# Patient Record
Sex: MALE | Race: WHITE | Employment: OTHER | URBAN - METROPOLITAN AREA
[De-identification: names, ages, dates, MRNs, and addresses within clinical notes are randomized per-mention and may not be internally consistent; named-entity substitution may affect disease eponyms.]

---

## 2017-01-01 ENCOUNTER — HOSPITAL ENCOUNTER (OUTPATIENT)
Dept: OTHER | Age: 67
Discharge: HOME | End: 2017-01-31
Attending: INTERNAL MEDICINE | Admitting: INTERNAL MEDICINE

## 2017-01-16 ENCOUNTER — TELEPHONE (OUTPATIENT)
Dept: CARDIOLOGY CLINIC | Age: 67
End: 2017-01-16

## 2017-01-16 RX ORDER — DILTIAZEM HYDROCHLORIDE 240 MG/1
CAPSULE, COATED, EXTENDED RELEASE ORAL
Qty: 90 CAPSULE | Refills: 3 | Status: SHIPPED | OUTPATIENT
Start: 2017-01-16 | End: 2018-01-04 | Stop reason: SDUPTHER

## 2017-01-23 ENCOUNTER — ANTI-COAG VISIT (OUTPATIENT)
Dept: PHARMACY | Age: 67
End: 2017-01-23

## 2017-01-23 DIAGNOSIS — I48.21 PERMANENT ATRIAL FIBRILLATION (HCC): ICD-10-CM

## 2017-01-23 LAB
INR BLD: 2.6
PROTIME: 31.6 SECONDS

## 2017-03-06 ENCOUNTER — ANTI-COAG VISIT (OUTPATIENT)
Dept: PHARMACY | Age: 67
End: 2017-03-06

## 2017-03-06 DIAGNOSIS — I48.21 PERMANENT ATRIAL FIBRILLATION (HCC): ICD-10-CM

## 2017-03-06 LAB — INR BLD: 2.2

## 2017-04-03 ENCOUNTER — TELEPHONE (OUTPATIENT)
Dept: CARDIOLOGY CLINIC | Age: 67
End: 2017-04-03

## 2017-04-10 ENCOUNTER — TELEPHONE (OUTPATIENT)
Dept: PHARMACY | Age: 67
End: 2017-04-10

## 2017-04-18 RX ORDER — LISINOPRIL AND HYDROCHLOROTHIAZIDE 20; 12.5 MG/1; MG/1
TABLET ORAL
Qty: 90 TABLET | Refills: 3 | Status: SHIPPED | OUTPATIENT
Start: 2017-04-18 | End: 2018-04-05 | Stop reason: ALTCHOICE

## 2017-04-28 ENCOUNTER — ANTI-COAG VISIT (OUTPATIENT)
Dept: PHARMACY | Age: 67
End: 2017-04-28

## 2017-04-28 DIAGNOSIS — I48.21 PERMANENT ATRIAL FIBRILLATION (HCC): ICD-10-CM

## 2017-04-28 LAB
INR BLD: 1.3
PROTIME: 15.7 SECONDS

## 2017-05-25 ENCOUNTER — TELEPHONE (OUTPATIENT)
Dept: PHARMACY | Age: 67
End: 2017-05-25

## 2017-05-31 ENCOUNTER — ANTI-COAG VISIT (OUTPATIENT)
Dept: PHARMACY | Age: 67
End: 2017-05-31

## 2017-05-31 DIAGNOSIS — I48.21 PERMANENT ATRIAL FIBRILLATION (HCC): ICD-10-CM

## 2017-05-31 LAB — INR BLD: 2.5

## 2017-07-12 ENCOUNTER — ANTI-COAG VISIT (OUTPATIENT)
Dept: PHARMACY | Age: 67
End: 2017-07-12

## 2017-07-12 DIAGNOSIS — I48.21 PERMANENT ATRIAL FIBRILLATION (HCC): ICD-10-CM

## 2017-07-12 LAB
INR BLD: 2.1
PROTIME: 25 SECONDS

## 2017-08-24 ENCOUNTER — ANTI-COAG VISIT (OUTPATIENT)
Dept: PHARMACY | Age: 67
End: 2017-08-24

## 2017-08-24 DIAGNOSIS — I48.21 PERMANENT ATRIAL FIBRILLATION (HCC): ICD-10-CM

## 2017-08-24 LAB
INR BLD: 2.8
PROTIME: 33.8 SECONDS

## 2017-10-04 ENCOUNTER — ANTI-COAG VISIT (OUTPATIENT)
Dept: PHARMACY | Age: 67
End: 2017-10-04

## 2017-10-04 DIAGNOSIS — I48.21 PERMANENT ATRIAL FIBRILLATION (HCC): ICD-10-CM

## 2017-10-04 LAB
INR BLD: 2.7
PROTIME: 32.7 SECONDS

## 2017-10-04 NOTE — MR AVS SNAPSHOT
type 2 diabetes, stroke, gallstones, arthritis, sleep apnea, and certain cancers. BMI is not perfect. It may overestimate body fat in athletes and people who are more muscular. Even a small weight loss (between 5 and 10 percent of your current weight) by decreasing your calorie intake and becoming more physically active will help lower your risk of developing or worsening diseases associated with obesity. Learn more at: [email or web address]             Medications and Orders      Your Current Medications Are              metoprolol tartrate (LOPRESSOR) 25 MG tablet TAKE 1 TABLET BY MOUTH TWICE DAILY    lisinopril-hydrochlorothiazide (PRINZIDE;ZESTORETIC) 20-12.5 MG per tablet TAKE 1 TABLET BY MOUTH DAILY    diltiazem (CARDIZEM CD) 240 MG extended release capsule TAKE 1 CAPSULE BY MOUTH DAILY    metFORMIN (GLUCOPHAGE) 1000 MG tablet Take 1,000 mg by mouth 2 times daily (with meals). warfarin (COUMADIN) 5 MG tablet One tab daily, as directed    zolpidem (AMBIEN) 10 MG tablet     atorvastatin (LIPITOR) 10 MG tablet Take 1 tablet by mouth daily. aspirin EC 81 MG EC tablet Take 1 tablet by mouth daily. Allergies           No Known Allergies      We Ordered/Performed the following           Protime-INR     Comments: This external order was created through the results console.          Result Summary for Protime-INR      Result Information     Status          Final result (Resulted: 10/4/2017  8:59 AM)           10/4/2017  8:59 AM      Component Results     Component Value Ref Range & Units Status    INR 2.7  Final    Protime 32.7 seconds Final               Additional Information        Basic Information     Date Of Birth Sex Race Ethnicity Preferred Language    1950 Male White Non-/Non  English      Problem List as of 10/4/2017  Date Reviewed: 12/12/2016                Atrial fibrillation ([facility] [address]    Essential hypertension, benign Coronary atherosclerosis of native coronary artery    Sleep apnea    Long term current use of anticoagulant therapy    Chronic pain of right knee    Morbid obesity with BMI of 40.0-44.9, adult (HCC)    Pain in joint, lower leg    Primary osteoarthritis of right knee    Chondromalacia of right patellofemoral joint    Status post arthroscopic surgery of right knee chondroplasty, synovectomy, partial medial meniscectomy on 2/28/2006 (3A patellofemoral and medial compartment)    Anemia    GI bleed      Immunizations as of 10/4/2017     Name Date    Influenza Virus Vaccine 11/4/2013      Preventive Care        Date Due    Hepatitis C screening is recommended for all adults regardless of risk factors born between [facility] at least once (lifetime) who have never been tested. 1950    Tetanus Combination Vaccine (1 - Tdap) 3/10/1970    Diabetes Screening 3/10/1991    Colonoscopy 3/10/2001    Zoster Vaccine 3/10/2011    Cholesterol Screening 2/11/2017    Pneumococcal Vaccines (two) for all adults aged 72 and over (2 of 2 - PPSV23) 6/1/2017    Yearly Flu Vaccine (1) 9/1/2017            Overblogt Signup           Our records indicate that you have an active [employer] account. You can view your After Visit Summary by going to [email or web address] and logging in with your [employer] username and password. If you don't have a [employer] username and password but a parent or guardian has access to your record, the parent or guardian should login with their own [employer] username and password and access your record to view the After Visit Summary. Additional Information  If you have questions, please contact the physician practice where you receive care. Remember, [employer] is NOT to be used for urgent needs. For medical emergencies, dial 911. For questions regarding your [employer] account call [phone]. If you have a clinical question, please call your doctor's office. October 2017 Details    Sun Mon Tue Wed Thu Fri Sat     1               2               3               4      5 mg   See details      5      5 mg         6      5 mg         7      5 mg           8      5 mg         9      5 mg         10      5 mg         11      5 mg         12      5 mg         13      5 mg         14      5 mg           15      5 mg         16      5 mg         17      5 mg         18      5 mg         19      5 mg         20      5 mg         21      5 mg           22      5 mg         23      5 mg         24      5 mg         25      5 mg         26      5 mg         27      5 mg         28      5 mg           29      5 mg         30      5 mg         31      5 mg              Date Details   10/04 This INR check               How to take your warfarin dose              To take:  5 mg Take 1 of the 5 mg tablets. November 2017 Details    Sun Mon Tue Wed Thu Fri Sat        1      5 mg         2      5 mg         3      5 mg         4      5 mg           5      5 mg         6      5 mg         7      5 mg         8      5 mg         9      5 mg         10      5 mg         11      5 mg           12      5 mg         13      5 mg         14      5 mg         15      5 mg         16               17               18                 19               20               21               22               23               24               25                 26               27               28               29               30                  Date Details   No additional details    Date of next INR:  11/15/2017         How to take your warfarin dose              To take:  5 mg Take 1 of the 5 mg tablets.

## 2017-11-01 ENCOUNTER — HOSPITAL ENCOUNTER (OUTPATIENT)
Dept: OTHER | Age: 67
Discharge: HOME | End: 2017-11-30
Attending: INTERNAL MEDICINE | Admitting: INTERNAL MEDICINE

## 2017-11-03 ENCOUNTER — TELEPHONE (OUTPATIENT)
Dept: ORTHOPEDIC SURGERY | Age: 67
End: 2017-11-03

## 2017-11-03 NOTE — TELEPHONE ENCOUNTER
11/03/2017 [facility]     RIGHT KNEE. NO AUTHORIZATION REQUIRED. CAN BUY& BILL. JUAN FOLLOWS MEDICARE GUIDELINES.  AP

## 2017-11-15 ENCOUNTER — ANTI-COAG VISIT (OUTPATIENT)
Dept: PHARMACY | Age: 67
End: 2017-11-15

## 2017-11-15 DIAGNOSIS — I48.21 PERMANENT ATRIAL FIBRILLATION (HCC): ICD-10-CM

## 2017-11-15 LAB
INR BLD: 2.5
PROTIME: 30.4 SECONDS

## 2017-11-16 ENCOUNTER — TELEPHONE (OUTPATIENT)
Dept: CARDIOLOGY CLINIC | Age: 67
End: 2017-11-16

## 2017-11-16 NOTE — LETTER
[address]  [address]  [address] [address]  Phone: [phone]  Fax: [phone]    Yain Nickerson MD        November 22, 2017     Patient: Elizabeth Camargo   YOB: 1950   Date of Visit: 11/16/2017       To Whom It May Concern: It is my medical opinion that Elizabeth Camargo should hold coumadin no longer than 3 days. If you have any questions or concerns, please don't hesitate to call.     Sincerely,        Yani Nickerson MD

## 2017-12-01 ENCOUNTER — HOSPITAL ENCOUNTER (OUTPATIENT)
Dept: OTHER | Age: 67
Discharge: HOME | End: 2017-12-31
Attending: INTERNAL MEDICINE | Admitting: INTERNAL MEDICINE

## 2018-01-01 ENCOUNTER — HOSPITAL ENCOUNTER (OUTPATIENT)
Dept: OTHER | Age: 68
Discharge: HOME | End: 2018-01-31
Attending: INTERNAL MEDICINE | Admitting: INTERNAL MEDICINE

## 2018-01-05 RX ORDER — DILTIAZEM HYDROCHLORIDE 240 MG/1
CAPSULE, EXTENDED RELEASE ORAL
Qty: 90 CAPSULE | Refills: 0 | Status: SHIPPED | OUTPATIENT
Start: 2018-01-05 | End: 2018-04-18 | Stop reason: SDUPTHER

## 2018-01-08 ENCOUNTER — ANTI-COAG VISIT (OUTPATIENT)
Dept: PHARMACY | Age: 68
End: 2018-01-08

## 2018-01-08 DIAGNOSIS — I48.21 PERMANENT ATRIAL FIBRILLATION (HCC): ICD-10-CM

## 2018-01-08 LAB
INR BLD: 2.8
PROTIME: 33.3 SECONDS

## 2018-01-17 ENCOUNTER — OFFICE VISIT (OUTPATIENT)
Dept: CARDIOLOGY CLINIC | Age: 68
End: 2018-01-17

## 2018-01-17 VITALS
WEIGHT: 315 LBS | BODY MASS INDEX: 45.1 KG/M2 | HEART RATE: 76 BPM | DIASTOLIC BLOOD PRESSURE: 82 MMHG | HEIGHT: 70 IN | OXYGEN SATURATION: 93 % | SYSTOLIC BLOOD PRESSURE: 126 MMHG

## 2018-01-17 DIAGNOSIS — G47.30 SLEEP APNEA, UNSPECIFIED TYPE: ICD-10-CM

## 2018-01-17 DIAGNOSIS — I25.10 ATHEROSCLEROSIS OF NATIVE CORONARY ARTERY OF NATIVE HEART WITHOUT ANGINA PECTORIS: ICD-10-CM

## 2018-01-17 DIAGNOSIS — E66.9 OBESITY (BMI 30-39.9): ICD-10-CM

## 2018-01-17 DIAGNOSIS — I48.21 PERMANENT ATRIAL FIBRILLATION (HCC): Primary | ICD-10-CM

## 2018-01-17 DIAGNOSIS — I10 ESSENTIAL HYPERTENSION, BENIGN: ICD-10-CM

## 2018-01-17 PROCEDURE — 99214 OFFICE O/P EST MOD 30 MIN: CPT | Performed by: INTERNAL MEDICINE

## 2018-01-17 PROCEDURE — 93000 ELECTROCARDIOGRAM COMPLETE: CPT | Performed by: INTERNAL MEDICINE

## 2018-01-17 NOTE — PROGRESS NOTES
GASTROINTESTINAL ENDOSCOPY  02/07/14    EGD/Colonoscopy with biopsy of ulcer       Allergies:  No Known Allergies    Social History:   reports that he has never smoked. He has never used smokeless tobacco. He reports that he drinks about 0.6 oz of alcohol per week . He reports that he does not use drugs. Family History:  family history includes Cancer in his father and sister; Heart Disease in his mother. Reviewed. Denies family history of sudden cardiac death, arrhythmia, premature CAD    Review of System:  All other systems reviewed and are negative except for that noted above. Pertinent negatives are:   General: negative for fever, chills   Ophthalmic ROS: negative for - eye pain or loss of vision  ENT ROS: negative for - headaches, sore throat   Respiratory: negative for - cough, sputum  Cardiovascular: Reviewed in HPI  Gastrointestinal: negative for - abdominal pain, diarrhea, N/V  Hematology: negative for - bleeding, blood clots, bruising or jaundice  Genito-Urinary:  negative for - Dysuria or incontinence  Musculoskeletal: negative for - Joint swelling, muscle pain  Neurological: negative for - confusion, dizziness, headaches   Psychiatric: No anxiety, no depression. Dermatological: negative for - rash    Physical Examination:  Vitals:    01/17/18 1334   BP: 126/82   Pulse: 76   SpO2: 93%      Wt Readings from Last 3 Encounters:   01/17/18 (!) 326 lb 12.8 oz (148.2 kg)   12/21/17 (!) 309 lb (140.2 kg)   10/26/17 (!) 304 lb (137.9 kg)       · Constitutional: Oriented. No distress. · Head: Normocephalic and atraumatic. · Mouth/Throat: Oropharynx is clear and moist.   · Eyes: Conjunctivae normal. EOM are normal.   · Neck: Neck supple. No rigidity. No JVD present. · Cardiovascular: Normal rate, irregular rhythm, S1&S2. · Pulmonary/Chest: Bilateral respiratory sounds. No wheezes, No rhonchi. · Abdominal: Soft. Bowel sounds present. No distension, No tenderness.    · Musculoskeletal: No

## 2018-02-01 ENCOUNTER — HOSPITAL ENCOUNTER (OUTPATIENT)
Dept: OTHER | Age: 68
Discharge: HOME | End: 2018-02-28
Attending: INTERNAL MEDICINE | Admitting: INTERNAL MEDICINE

## 2018-02-21 ENCOUNTER — ANTI-COAG VISIT (OUTPATIENT)
Dept: PHARMACY | Age: 68
End: 2018-02-21

## 2018-02-21 DIAGNOSIS — I48.21 PERMANENT ATRIAL FIBRILLATION (HCC): ICD-10-CM

## 2018-02-21 LAB
INR BLD: 2.3
PROTIME: 27.5 SECONDS

## 2018-03-01 ENCOUNTER — HOSPITAL ENCOUNTER (OUTPATIENT)
Dept: OTHER | Age: 68
Discharge: HOME | End: 2018-03-31
Attending: INTERNAL MEDICINE | Admitting: INTERNAL MEDICINE

## 2018-03-17 PROBLEM — K92.2 UPPER GI BLEED: Status: ACTIVE | Noted: 2018-03-17

## 2018-04-01 ENCOUNTER — HOSPITAL ENCOUNTER (OUTPATIENT)
Dept: OTHER | Age: 68
Discharge: HOME | End: 2018-04-30
Attending: INTERNAL MEDICINE | Admitting: INTERNAL MEDICINE

## 2018-04-09 ENCOUNTER — OFFICE VISIT (OUTPATIENT)
Dept: CARDIOLOGY CLINIC | Age: 68
End: 2018-04-09

## 2018-04-09 ENCOUNTER — ANTI-COAG VISIT (OUTPATIENT)
Dept: PHARMACY | Age: 68
End: 2018-04-09

## 2018-04-09 VITALS
OXYGEN SATURATION: 94 % | DIASTOLIC BLOOD PRESSURE: 84 MMHG | WEIGHT: 315 LBS | HEIGHT: 70 IN | SYSTOLIC BLOOD PRESSURE: 124 MMHG | BODY MASS INDEX: 45.1 KG/M2 | HEART RATE: 90 BPM

## 2018-04-09 DIAGNOSIS — I48.91 ATRIAL FIBRILLATION WITH RAPID VENTRICULAR RESPONSE (HCC): ICD-10-CM

## 2018-04-09 DIAGNOSIS — I48.20 CHRONIC ATRIAL FIBRILLATION (HCC): Primary | ICD-10-CM

## 2018-04-09 DIAGNOSIS — I10 ESSENTIAL HYPERTENSION: ICD-10-CM

## 2018-04-09 DIAGNOSIS — E78.2 MIXED HYPERLIPIDEMIA: ICD-10-CM

## 2018-04-09 LAB
INR BLD: 2.3
PROTIME: 27.2 SECONDS

## 2018-04-09 PROCEDURE — 99214 OFFICE O/P EST MOD 30 MIN: CPT | Performed by: INTERNAL MEDICINE

## 2018-04-09 RX ORDER — FUROSEMIDE 40 MG/1
20 TABLET ORAL DAILY
Qty: 30 TABLET | Refills: 5 | Status: SHIPPED | OUTPATIENT
Start: 2018-04-09 | End: 2019-05-21 | Stop reason: SDUPTHER

## 2018-04-18 ENCOUNTER — TELEPHONE (OUTPATIENT)
Dept: CARDIOLOGY CLINIC | Age: 68
End: 2018-04-18

## 2018-04-18 RX ORDER — DILTIAZEM HYDROCHLORIDE 240 MG/1
CAPSULE, COATED, EXTENDED RELEASE ORAL
Qty: 90 CAPSULE | Refills: 2 | Status: SHIPPED | OUTPATIENT
Start: 2018-04-18 | End: 2019-06-13 | Stop reason: SDUPTHER

## 2018-04-18 RX ORDER — DILTIAZEM HYDROCHLORIDE 240 MG/1
CAPSULE, EXTENDED RELEASE ORAL
Qty: 90 CAPSULE | Refills: 3 | Status: SHIPPED | OUTPATIENT
Start: 2018-04-18 | End: 2019-03-26 | Stop reason: SDUPTHER

## 2018-04-23 RX ORDER — DILTIAZEM HYDROCHLORIDE 240 MG/1
CAPSULE, COATED, EXTENDED RELEASE ORAL
Qty: 90 CAPSULE | Refills: 2 | OUTPATIENT
Start: 2018-04-23

## 2018-05-01 ENCOUNTER — HOSPITAL ENCOUNTER (OUTPATIENT)
Dept: OTHER | Age: 68
Discharge: HOME | End: 2018-05-31
Attending: INTERNAL MEDICINE | Admitting: INTERNAL MEDICINE

## 2018-05-15 ENCOUNTER — TELEPHONE (OUTPATIENT)
Dept: PHARMACY | Age: 68
End: 2018-05-15

## 2018-05-17 ENCOUNTER — ANTI-COAG VISIT (OUTPATIENT)
Dept: PHARMACY | Age: 68
End: 2018-05-17

## 2018-05-17 DIAGNOSIS — I48.91 ATRIAL FIBRILLATION WITH RAPID VENTRICULAR RESPONSE (HCC): ICD-10-CM

## 2018-05-17 LAB
INR BLD: 3.8
PROTIME: 46.1 SECONDS

## 2018-06-01 ENCOUNTER — HOSPITAL ENCOUNTER (OUTPATIENT)
Dept: OTHER | Age: 68
Discharge: HOME | End: 2018-06-30
Attending: INTERNAL MEDICINE | Admitting: INTERNAL MEDICINE

## 2018-06-14 ENCOUNTER — ANTI-COAG VISIT (OUTPATIENT)
Dept: PHARMACY | Age: 68
End: 2018-06-14

## 2018-06-14 ENCOUNTER — TELEPHONE (OUTPATIENT)
Dept: CARDIOLOGY CLINIC | Age: 68
End: 2018-06-14

## 2018-06-14 DIAGNOSIS — I48.91 ATRIAL FIBRILLATION WITH RAPID VENTRICULAR RESPONSE (HCC): ICD-10-CM

## 2018-06-14 LAB
INR BLD: 2.6
PROTIME: 30.8 SECONDS

## 2018-06-29 ENCOUNTER — HOSPITAL ENCOUNTER (OUTPATIENT)
Dept: CARDIAC CATH/INVASIVE PROCEDURES | Age: 68
Discharge: HOME | End: 2018-06-20
Attending: INTERNAL MEDICINE | Admitting: INTERNAL MEDICINE

## 2018-06-29 ENCOUNTER — HOSPITAL ENCOUNTER (OUTPATIENT)
Dept: NON INVASIVE DIAGNOSTICS | Age: 68
Discharge: HOME | End: 2018-06-28
Attending: INTERNAL MEDICINE | Admitting: INTERNAL MEDICINE

## 2018-07-01 ENCOUNTER — HOSPITAL ENCOUNTER (OUTPATIENT)
Dept: OTHER | Age: 68
Discharge: HOME | End: 2018-07-31
Attending: INTERNAL MEDICINE | Admitting: INTERNAL MEDICINE

## 2018-07-26 ENCOUNTER — ANTI-COAG VISIT (OUTPATIENT)
Dept: PHARMACY | Age: 68
End: 2018-07-26

## 2018-07-26 DIAGNOSIS — I48.91 ATRIAL FIBRILLATION WITH RAPID VENTRICULAR RESPONSE (HCC): ICD-10-CM

## 2018-07-26 LAB
INR BLD: 3.8
PROTIME: 45.9 SECONDS

## 2018-07-26 NOTE — PROGRESS NOTES
Mr. Lyric Auguste is a 80 y.o. y/o male with history of Afib   He presents today for anticoagulation monitoring and adjustment. Pertinent PMH: HTN, CAD     Patient Reported Findings:  Yes     No  [x]   []       Patient verifies current dosing regimen as listed  []   [x]       S/S bleeding/bruising/swelling/SOB  []   [x]       Blood in urine or stool  []   [x]       Procedures scheduled in the future at this time  []   [x]       Missed dose  []   [x]       Extra dose  []   [x]       Change in medications  []   [x]       Change in health/diet/appetite  [x]   []       Change in alcohol use--states that he has been drinking 1 glass every so often, not normal.  []   [x]       Change in activity   []   [x]       Hospital admission  []   [x]       Emergency department visit  [x]   []       Other complaints ---Sister-in-law and brother in [place]. Sister-in-law with some health issues. Patient is planning to visit [place]. Clinical Outcomes:  Yes     No  []   [x]       Major bleeding event  []   [x]       Thromboembolic event    Takes warfarin in AM  Duration of warfarin Therapy: indefinitly  INR Range:  2.0-3.0    INR 3.8 today despite no changes   Hold dose tomorrow then continue same weekly dose of 5mg daily. Encouraged to maintain a consistency of vegetables/salads. Recheck INR in 3 weeks since going on vacation, 8/16  Return to 6 week appt when appropriate. Referring cardiologist is Dr. Jose Burkett.    INR (no units)   Date Value   06/14/2018 2.6   05/17/2018 3.8   04/09/2018 2.3   04/05/2018 2.42 (H)

## 2018-08-01 ENCOUNTER — HOSPITAL ENCOUNTER (OUTPATIENT)
Dept: OTHER | Age: 68
Discharge: HOME | End: 2018-08-31
Attending: INTERNAL MEDICINE | Admitting: INTERNAL MEDICINE

## 2018-08-21 ENCOUNTER — ANTI-COAG VISIT (OUTPATIENT)
Dept: PHARMACY | Age: 68
End: 2018-08-21

## 2018-08-21 DIAGNOSIS — I48.91 ATRIAL FIBRILLATION WITH RAPID VENTRICULAR RESPONSE (HCC): ICD-10-CM

## 2018-08-21 LAB
INR BLD: 2
PROTIME: 23.5 SECONDS

## 2018-09-01 ENCOUNTER — HOSPITAL ENCOUNTER (OUTPATIENT)
Dept: OTHER | Age: 68
Discharge: HOME | End: 2018-09-01
Attending: INTERNAL MEDICINE | Admitting: INTERNAL MEDICINE

## 2018-09-13 ENCOUNTER — TELEPHONE (OUTPATIENT)
Dept: CARDIOLOGY CLINIC | Age: 68
End: 2018-09-13

## 2018-10-10 ENCOUNTER — ANTI-COAG VISIT (OUTPATIENT)
Dept: PHARMACY | Age: 68
End: 2018-10-10
Payer: COMMERCIAL

## 2018-10-10 DIAGNOSIS — I48.91 ATRIAL FIBRILLATION WITH RAPID VENTRICULAR RESPONSE (HCC): ICD-10-CM

## 2018-10-10 LAB — INTERNATIONAL NORMALIZATION RATIO, POC: 3

## 2018-10-10 PROCEDURE — 99211 OFF/OP EST MAY X REQ PHY/QHP: CPT

## 2018-10-10 PROCEDURE — 85610 PROTHROMBIN TIME: CPT

## 2018-11-30 ENCOUNTER — TELEPHONE (OUTPATIENT)
Dept: PHARMACY | Age: 68
End: 2018-11-30

## 2018-12-07 ENCOUNTER — ANTI-COAG VISIT (OUTPATIENT)
Dept: PHARMACY | Age: 68
End: 2018-12-07
Payer: COMMERCIAL

## 2018-12-07 DIAGNOSIS — I48.91 ATRIAL FIBRILLATION WITH RAPID VENTRICULAR RESPONSE (HCC): ICD-10-CM

## 2018-12-07 LAB — INTERNATIONAL NORMALIZATION RATIO, POC: 3.3

## 2018-12-07 PROCEDURE — 99212 OFFICE O/P EST SF 10 MIN: CPT

## 2018-12-07 PROCEDURE — 85610 PROTHROMBIN TIME: CPT

## 2018-12-10 ENCOUNTER — OFFICE VISIT (OUTPATIENT)
Dept: ORTHOPEDIC SURGERY | Age: 68
End: 2018-12-10
Payer: COMMERCIAL

## 2018-12-10 VITALS
DIASTOLIC BLOOD PRESSURE: 70 MMHG | BODY MASS INDEX: 46.65 KG/M2 | HEIGHT: 69 IN | WEIGHT: 315 LBS | HEART RATE: 88 BPM | SYSTOLIC BLOOD PRESSURE: 130 MMHG

## 2018-12-10 DIAGNOSIS — M17.11 PRIMARY OSTEOARTHRITIS OF RIGHT KNEE: Primary | ICD-10-CM

## 2018-12-10 DIAGNOSIS — M17.12 PRIMARY OSTEOARTHRITIS OF LEFT KNEE: ICD-10-CM

## 2018-12-10 DIAGNOSIS — M22.41 CHONDROMALACIA OF RIGHT PATELLOFEMORAL JOINT: ICD-10-CM

## 2018-12-10 PROCEDURE — 99213 OFFICE O/P EST LOW 20 MIN: CPT | Performed by: ORTHOPAEDIC SURGERY

## 2018-12-10 PROCEDURE — 20610 DRAIN/INJ JOINT/BURSA W/O US: CPT | Performed by: ORTHOPAEDIC SURGERY

## 2019-01-07 ENCOUNTER — ANTI-COAG VISIT (OUTPATIENT)
Dept: PHARMACY | Age: 69
End: 2019-01-07
Payer: COMMERCIAL

## 2019-01-07 ENCOUNTER — OFFICE VISIT (OUTPATIENT)
Dept: CARDIOLOGY CLINIC | Age: 69
End: 2019-01-07
Payer: COMMERCIAL

## 2019-01-07 VITALS
WEIGHT: 312.8 LBS | HEIGHT: 70 IN | BODY MASS INDEX: 44.78 KG/M2 | SYSTOLIC BLOOD PRESSURE: 130 MMHG | HEART RATE: 80 BPM | DIASTOLIC BLOOD PRESSURE: 80 MMHG

## 2019-01-07 DIAGNOSIS — I48.91 ATRIAL FIBRILLATION WITH RAPID VENTRICULAR RESPONSE (HCC): ICD-10-CM

## 2019-01-07 DIAGNOSIS — E66.01 MORBID OBESITY (HCC): Primary | ICD-10-CM

## 2019-01-07 DIAGNOSIS — Z83.79: ICD-10-CM

## 2019-01-07 DIAGNOSIS — I25.10 ATHEROSCLEROSIS OF NATIVE CORONARY ARTERY OF NATIVE HEART WITHOUT ANGINA PECTORIS: ICD-10-CM

## 2019-01-07 DIAGNOSIS — I48.20 CHRONIC ATRIAL FIBRILLATION (HCC): ICD-10-CM

## 2019-01-07 LAB — INTERNATIONAL NORMALIZATION RATIO, POC: 2.9

## 2019-01-07 PROCEDURE — 85610 PROTHROMBIN TIME: CPT

## 2019-01-07 PROCEDURE — 99211 OFF/OP EST MAY X REQ PHY/QHP: CPT

## 2019-01-07 PROCEDURE — 93000 ELECTROCARDIOGRAM COMPLETE: CPT | Performed by: INTERNAL MEDICINE

## 2019-01-07 PROCEDURE — 99214 OFFICE O/P EST MOD 30 MIN: CPT | Performed by: INTERNAL MEDICINE

## 2019-02-27 ENCOUNTER — ANTI-COAG VISIT (OUTPATIENT)
Dept: PHARMACY | Age: 69
End: 2019-02-27
Payer: COMMERCIAL

## 2019-02-27 DIAGNOSIS — I48.91 ATRIAL FIBRILLATION WITH RAPID VENTRICULAR RESPONSE (HCC): ICD-10-CM

## 2019-02-27 LAB — INTERNATIONAL NORMALIZATION RATIO, POC: 2.7

## 2019-02-27 PROCEDURE — 99211 OFF/OP EST MAY X REQ PHY/QHP: CPT

## 2019-02-27 PROCEDURE — 85610 PROTHROMBIN TIME: CPT

## 2019-03-18 ENCOUNTER — OFFICE VISIT (OUTPATIENT)
Dept: ORTHOPEDIC SURGERY | Age: 69
End: 2019-03-18
Payer: COMMERCIAL

## 2019-03-18 VITALS
SYSTOLIC BLOOD PRESSURE: 119 MMHG | WEIGHT: 306 LBS | DIASTOLIC BLOOD PRESSURE: 74 MMHG | HEART RATE: 67 BPM | BODY MASS INDEX: 45.32 KG/M2 | HEIGHT: 69 IN

## 2019-03-18 DIAGNOSIS — M17.12 PRIMARY OSTEOARTHRITIS OF LEFT KNEE: ICD-10-CM

## 2019-03-18 DIAGNOSIS — M17.11 PRIMARY OSTEOARTHRITIS OF RIGHT KNEE: Primary | ICD-10-CM

## 2019-03-18 PROCEDURE — 20610 DRAIN/INJ JOINT/BURSA W/O US: CPT | Performed by: ORTHOPAEDIC SURGERY

## 2019-03-18 PROCEDURE — 99213 OFFICE O/P EST LOW 20 MIN: CPT | Performed by: ORTHOPAEDIC SURGERY

## 2019-03-26 RX ORDER — DILTIAZEM HYDROCHLORIDE 240 MG/1
CAPSULE, EXTENDED RELEASE ORAL
Qty: 90 CAPSULE | Refills: 3 | Status: SHIPPED | OUTPATIENT
Start: 2019-03-26 | End: 2020-03-24

## 2019-04-10 ENCOUNTER — ANTI-COAG VISIT (OUTPATIENT)
Dept: PHARMACY | Age: 69
End: 2019-04-10
Payer: COMMERCIAL

## 2019-04-10 DIAGNOSIS — I48.91 ATRIAL FIBRILLATION WITH RAPID VENTRICULAR RESPONSE (HCC): ICD-10-CM

## 2019-04-10 LAB — INTERNATIONAL NORMALIZATION RATIO, POC: 3.3

## 2019-04-10 PROCEDURE — 85610 PROTHROMBIN TIME: CPT

## 2019-04-10 PROCEDURE — 99211 OFF/OP EST MAY X REQ PHY/QHP: CPT

## 2019-05-15 ENCOUNTER — ANTI-COAG VISIT (OUTPATIENT)
Dept: PHARMACY | Age: 69
End: 2019-05-15
Payer: COMMERCIAL

## 2019-05-15 DIAGNOSIS — I48.91 ATRIAL FIBRILLATION WITH RAPID VENTRICULAR RESPONSE (HCC): ICD-10-CM

## 2019-05-15 LAB — INTERNATIONAL NORMALIZATION RATIO, POC: 2.4

## 2019-05-15 PROCEDURE — 85610 PROTHROMBIN TIME: CPT

## 2019-05-15 PROCEDURE — 99211 OFF/OP EST MAY X REQ PHY/QHP: CPT

## 2019-05-21 ENCOUNTER — TELEPHONE (OUTPATIENT)
Dept: CARDIOLOGY CLINIC | Age: 69
End: 2019-05-21

## 2019-05-21 ENCOUNTER — NURSE ONLY (OUTPATIENT)
Dept: CARDIOLOGY CLINIC | Age: 69
End: 2019-05-21
Payer: COMMERCIAL

## 2019-05-21 DIAGNOSIS — I49.9 IRREGULAR HEART RATE: Primary | ICD-10-CM

## 2019-05-21 DIAGNOSIS — I48.91 ATRIAL FIBRILLATION WITH RAPID VENTRICULAR RESPONSE (HCC): ICD-10-CM

## 2019-05-21 PROCEDURE — 93000 ELECTROCARDIOGRAM COMPLETE: CPT | Performed by: INTERNAL MEDICINE

## 2019-05-21 RX ORDER — FUROSEMIDE 40 MG/1
40 TABLET ORAL DAILY
Qty: 30 TABLET | Refills: 5 | Status: SHIPPED | OUTPATIENT
Start: 2019-05-21 | End: 2019-05-21 | Stop reason: SDUPTHER

## 2019-05-21 NOTE — PROGRESS NOTES
In to see patient. RMM confirmed ECG shows afib. Patient sob and has some BLE edema that has been worsening the past month. He used to take lasix but never had it refilled. Refilled and doubled lasix to 40 mg daily. Will refer to the heart failure team per request of RMM. Daily BP log and daily weights. If weight increase greater than 2 pounds call office.  Counseled patient to watch sodium and fluid intake until he sees HF team

## 2019-06-07 RX ORDER — FUROSEMIDE 40 MG/1
40 TABLET ORAL DAILY
Qty: 90 TABLET | Refills: 0 | Status: SHIPPED | OUTPATIENT
Start: 2019-06-07 | End: 2019-11-20

## 2019-06-13 ENCOUNTER — OFFICE VISIT (OUTPATIENT)
Dept: CARDIOLOGY CLINIC | Age: 69
End: 2019-06-13
Payer: COMMERCIAL

## 2019-06-13 ENCOUNTER — ANTI-COAG VISIT (OUTPATIENT)
Dept: PHARMACY | Age: 69
End: 2019-06-13
Payer: COMMERCIAL

## 2019-06-13 ENCOUNTER — HOSPITAL ENCOUNTER (OUTPATIENT)
Age: 69
Discharge: HOME | End: 2019-06-13
Payer: COMMERCIAL

## 2019-06-13 VITALS
BODY MASS INDEX: 42.76 KG/M2 | SYSTOLIC BLOOD PRESSURE: 120 MMHG | WEIGHT: 298.7 LBS | OXYGEN SATURATION: 94 % | HEIGHT: 70 IN | DIASTOLIC BLOOD PRESSURE: 58 MMHG | RESPIRATION RATE: 16 BRPM | HEART RATE: 75 BPM

## 2019-06-13 DIAGNOSIS — I48.91 ATRIAL FIBRILLATION WITH RAPID VENTRICULAR RESPONSE (HCC): ICD-10-CM

## 2019-06-13 DIAGNOSIS — G47.33 OSA (OBSTRUCTIVE SLEEP APNEA): ICD-10-CM

## 2019-06-13 DIAGNOSIS — I48.20 CHRONIC ATRIAL FIBRILLATION (HCC): Primary | ICD-10-CM

## 2019-06-13 DIAGNOSIS — R06.02 SOB (SHORTNESS OF BREATH): ICD-10-CM

## 2019-06-13 DIAGNOSIS — E66.01 MORBID OBESITY (HCC): ICD-10-CM

## 2019-06-13 DIAGNOSIS — I48.20 CHRONIC ATRIAL FIBRILLATION (HCC): ICD-10-CM

## 2019-06-13 LAB
INTERNATIONAL NORMALIZATION RATIO, POC: 3.6
PRO-BNP: 1730 PG/ML (ref 0–124)

## 2019-06-13 PROCEDURE — 36415 COLL VENOUS BLD VENIPUNCTURE: CPT

## 2019-06-13 PROCEDURE — G0463 HOSPITAL OUTPT CLINIC VISIT: HCPCS

## 2019-06-13 PROCEDURE — 99212 OFFICE O/P EST SF 10 MIN: CPT

## 2019-06-13 PROCEDURE — 99214 OFFICE O/P EST MOD 30 MIN: CPT | Performed by: CLINICAL NURSE SPECIALIST

## 2019-06-13 PROCEDURE — 85610 PROTHROMBIN TIME: CPT

## 2019-06-13 PROCEDURE — 83880 ASSAY OF NATRIURETIC PEPTIDE: CPT

## 2019-06-13 RX ORDER — PANTOPRAZOLE SODIUM 40 MG/1
40 TABLET, DELAYED RELEASE ORAL
Qty: 90 TABLET | Refills: 0
Start: 2019-06-13 | End: 2021-07-23

## 2019-06-13 RX ORDER — ERGOCALCIFEROL 1.25 MG/1
50000 CAPSULE ORAL WEEKLY
Qty: 12 CAPSULE | Refills: 1 | Status: SHIPPED | OUTPATIENT
Start: 2019-06-13 | End: 2019-12-06 | Stop reason: SDUPTHER
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
## 2019-06-13 NOTE — PATIENT INSTRUCTIONS
1.  Recommend you stay on protonix 40 mg once a day  2. Check echo with Dr Jaime Myers on July 8th  3. Check blood work today  4. Vitamin D 50,000 iu once a week. Check vitamin D level in 6 months  5.   Sleep evaluation referral

## 2019-06-13 NOTE — PROGRESS NOTES
Mr. Remigio Winslow is a 77 y.o. y/o male with history of Afib   He presents today for anticoagulation monitoring and adjustment. Pertinent PMH: HTN, CAD     Patient Reported Findings:  Yes     No  [x]   []       Patient verifies current dosing regimen as listed  []   [x]       S/S bleeding/bruising/swelling/SOB   []   [x]       Blood in urine or stool  []   [x]       Procedures scheduled in the future at this time is having cancerous spot on upper lip removed MOHS, will need to hold warfarin 3 days prior, procedure is 6/19  []   [x]       Missed dose   []   [x]       Extra dose   []   [x]       Change in medications  [x]   []       Change in health/diet/appetite Has increased his salad diet in an effort to lose weight and reduce the carbs --> states that states that he has lost 20 lbs   []   [x]       Change in alcohol use   []   [x]       Change in activity   []   [x]       Hospital admission  []   [x]       Emergency department visit  []   [x]       Other complaints     Clinical Outcomes:   Yes     No  []   [x]       Major bleeding event  []   [x]       Thromboembolic event    Takes warfarin in AM  Duration of warfarin Therapy: indefinitely  INR Range:  2.0-3.0    INR 3.6 today with no acute changes  Hold dose tonight then decrease weekly dose to 2.5 mg on Sun and 5 mg all other days of the week. (7.1% dec) Hold warfarin beginning Sun 6/16 until procedure on 6/19. Restart warfarin at same weekly dose above. Encouraged to maintain a consistency of vegetables/salads. Recheck INR in 4 weeks on 7/10 d/t holiday. Referring cardiologist is Dr. Susanne Huston.    INR (no units)   Date Value   06/13/2019 3.6   05/15/2019 2.4   04/10/2019 3.3   02/27/2019 2.7   08/21/2018 2   07/26/2018 3.8   06/14/2018 2.6   05/17/2018 3.8

## 2019-06-14 ENCOUNTER — TELEPHONE (OUTPATIENT)
Dept: CARDIOLOGY CLINIC | Age: 69
End: 2019-06-14

## 2019-06-14 NOTE — TELEPHONE ENCOUNTER
----- Message from SHAMAR Wilder CNS sent at 6/13/2019  3:50 PM EDT -----  Fluid level is good  Continue current medications  thanks

## 2019-07-01 ENCOUNTER — OFFICE VISIT (OUTPATIENT)
Dept: ORTHOPEDIC SURGERY | Age: 69
End: 2019-07-01
Payer: COMMERCIAL

## 2019-07-01 VITALS
WEIGHT: 290 LBS | HEIGHT: 70 IN | SYSTOLIC BLOOD PRESSURE: 100 MMHG | HEART RATE: 89 BPM | DIASTOLIC BLOOD PRESSURE: 67 MMHG | BODY MASS INDEX: 41.52 KG/M2

## 2019-07-01 DIAGNOSIS — M17.12 PRIMARY OSTEOARTHRITIS OF LEFT KNEE: ICD-10-CM

## 2019-07-01 DIAGNOSIS — M17.11 PRIMARY OSTEOARTHRITIS OF RIGHT KNEE: Primary | ICD-10-CM

## 2019-07-01 PROCEDURE — 99213 OFFICE O/P EST LOW 20 MIN: CPT | Performed by: ORTHOPAEDIC SURGERY

## 2019-07-01 PROCEDURE — 20610 DRAIN/INJ JOINT/BURSA W/O US: CPT | Performed by: ORTHOPAEDIC SURGERY

## 2019-07-01 NOTE — PROGRESS NOTES
The patient returns today for bilateral knee osteoarthritis. Bilateral knee injections about 3 months ago on 3/18/19. The injections have begun to wear off, but he is happy with how long the last at this time. He reports that he is been able to lose 30 pounds and wishes to lose more. ROS: Pertinent items are noted in HPI. Past Medical History:  No date: Atrial fibrillation (HCC)  No date: CAD (coronary artery disease)  No date: Cancer ([facility] [address]      Comment:  skin  No date: Diabetes mellitus ([facility] [address]  No date: Hypercholesteremia  No date: Hypertension  No date: Obesity  No date: Sleep apnea  2/2014: Ulcer of pyloric antrum      Comment:  anemia/GI bleed while on NSAIDS     Past Surgical History:  No date: COLONOSCOPY  No date: CYST REMOVAL  8/24/06: DIAGNOSTIC CARDIAC CATH LAB PROCEDURE  No date: KNEE SURGERY      Comment:  arthroscopy bilateral  02/07/14: UPPER GASTROINTESTINAL ENDOSCOPY      Comment:  EGD/Colonoscopy with biopsy of ulcer  03/19/2018: UPPER GASTROINTESTINAL ENDOSCOPY      Comment:  with bx    Review of patient's family history indicates:  Problem: Heart Disease      Relation: Mother          Age of Onset: (Not Specified)  Problem: Cancer      Relation: Father          Age of Onset: (Not Specified)  Problem: Cancer      Relation: Sister          Age of Onset: (Not Specified)          Comment: breast      Social History    Socioeconomic History      Marital status:       Spouse name: None      Number of children: None      Years of education: None      Highest education level: None    Occupational History      None    Social Needs      Financial resource strain: None      Food insecurity:        Worry: None        Inability: None      Transportation needs:        Medical: None        Non-medical: None    Tobacco Use      Smoking status: Never Smoker      Smokeless tobacco: Never Used    Substance and Sexual Activity      Alcohol use:  Yes        Alcohol/week: 0.6 oz        Types: 1 Cans of beer per week        Comment: occas      Drug use: No      Sexual activity: Yes        Partners: Female    Lifestyle      Physical activity:        Days per week: None        Minutes per session: None      Stress: None    Relationships      Social connections:        Talks on phone: None        Gets together: None        Attends Worship service: None        Active member of club or organization: None        Attends meetings of clubs or organizations: None        Relationship status: None      Intimate partner violence:        Fear of current or ex partner: None        Emotionally abused: None        Physically abused: None        Forced sexual activity: None    Other Topics      Concerns:        None    Social History Narrative      None      Current Outpatient Medications:  metoprolol tartrate (LOPRESSOR) 25 MG tablet, TAKE 1 TABLET BY MOUTH TWICE DAILY, Disp: 180 tablet, Rfl: 3  CARTIA  MG extended release capsule, TAKE 1 CAPSULE BY MOUTH DAILY, Disp: 90 capsule, Rfl: 3  diltiazem (CARTIA XT) 240 MG extended release capsule, TAKE 1 CAPSULE BY MOUTH DAILY, Disp: 90 capsule, Rfl: 2  lisinopril (PRINIVIL) 20 MG tablet, Take 1 tablet by mouth daily, Disp: 30 tablet, Rfl: 0  pantoprazole (PROTONIX) 40 MG tablet, Take 1 tablet by mouth every morning (before breakfast), Disp: 30 tablet, Rfl: 3  metFORMIN (GLUCOPHAGE) 1000 MG tablet, Take 1,000 mg by mouth 2 times daily (with meals). , Disp: , Rfl:   warfarin (COUMADIN) 5 MG tablet, One tab daily, as directed, Disp: 30 tablet, Rfl: 3  zolpidem (AMBIEN) 10 MG tablet, nightly as needed . , Disp: , Rfl: 2  atorvastatin (LIPITOR) 10 MG tablet, Take 1 tablet by mouth daily. , Disp: 30 tablet, Rfl: 0  aspirin EC 81 MG EC tablet, Take 1 tablet by mouth daily. , Disp: 30 tablet, Rfl: 3  furosemide (LASIX) 40 MG tablet, Take 0.5 tablets by mouth daily for 5 days, Disp: 30 tablet, Rfl: 5    No current facility-administered medications for this visit.         -- No Known

## 2019-07-07 ENCOUNTER — HOSPITAL ENCOUNTER (INPATIENT)
Age: 69
LOS: 4 days | Discharge: HOME | DRG: 638 | End: 2019-07-11
Attending: EMERGENCY MEDICINE | Admitting: FAMILY MEDICINE
Payer: COMMERCIAL

## 2019-07-07 DIAGNOSIS — R73.9 HYPERGLYCEMIA: ICD-10-CM

## 2019-07-07 DIAGNOSIS — Z78.9 FAILURE OF OUTPATIENT TREATMENT: ICD-10-CM

## 2019-07-07 DIAGNOSIS — A41.9 SEPSIS, DUE TO UNSPECIFIED ORGANISM: ICD-10-CM

## 2019-07-07 DIAGNOSIS — L03.116 CELLULITIS OF LEFT LOWER EXTREMITY: Primary | ICD-10-CM

## 2019-07-07 PROBLEM — L03.90 CELLULITIS: Status: ACTIVE | Noted: 2019-07-07

## 2019-07-07 LAB
A/G RATIO: 1.3 (ref 1.1–2.2)
ALBUMIN SERPL-MCNC: 4 G/DL (ref 3.4–5)
ALP BLD-CCNC: 89 U/L (ref 40–129)
ALT SERPL-CCNC: 17 U/L (ref 10–40)
ANION GAP SERPL CALCULATED.3IONS-SCNC: 11 MMOL/L (ref 3–16)
AST SERPL-CCNC: 18 U/L (ref 15–37)
BASOPHILS ABSOLUTE: 0.1 K/UL (ref 0–0.2)
BASOPHILS RELATIVE PERCENT: 0.8 %
BILIRUB SERPL-MCNC: 0.4 MG/DL (ref 0–1)
BUN BLDV-MCNC: 25 MG/DL (ref 7–20)
CALCIUM SERPL-MCNC: 8.9 MG/DL (ref 8.3–10.6)
CHLORIDE BLD-SCNC: 100 MMOL/L (ref 99–110)
CO2: 24 MMOL/L (ref 21–32)
CREAT SERPL-MCNC: 1.3 MG/DL (ref 0.8–1.3)
EOSINOPHILS ABSOLUTE: 0.1 K/UL (ref 0–0.6)
EOSINOPHILS RELATIVE PERCENT: 0.6 %
GFR AFRICAN AMERICAN: >60
GFR NON-AFRICAN AMERICAN: 55
GLOBULIN: 3 G/DL
GLUCOSE BLD-MCNC: 226 MG/DL (ref 70–99)
HCT VFR BLD CALC: 39.4 % (ref 40.5–52.5)
HEMOGLOBIN: 12.7 G/DL (ref 13.5–17.5)
INR BLD: 3.18 (ref 0.86–1.14)
LACTIC ACID: 1 MMOL/L (ref 0.4–2)
LYMPHOCYTES ABSOLUTE: 1.1 K/UL (ref 1–5.1)
LYMPHOCYTES RELATIVE PERCENT: 8.7 %
MCH RBC QN AUTO: 27 PG (ref 26–34)
MCHC RBC AUTO-ENTMCNC: 32.2 G/DL (ref 31–36)
MCV RBC AUTO: 84 FL (ref 80–100)
MONOCYTES ABSOLUTE: 0.9 K/UL (ref 0–1.3)
MONOCYTES RELATIVE PERCENT: 7.3 %
NEUTROPHILS ABSOLUTE: 10.4 K/UL (ref 1.7–7.7)
NEUTROPHILS RELATIVE PERCENT: 82.6 %
PDW BLD-RTO: 16.4 % (ref 12.4–15.4)
PLATELET # BLD: 269 K/UL (ref 135–450)
PMV BLD AUTO: 8.7 FL (ref 5–10.5)
POTASSIUM SERPL-SCNC: 4.5 MMOL/L (ref 3.5–5.1)
PROTHROMBIN TIME: 36.2 SEC (ref 9.8–13)
RBC # BLD: 4.69 M/UL (ref 4.2–5.9)
SODIUM BLD-SCNC: 135 MMOL/L (ref 136–145)
TOTAL PROTEIN: 7 G/DL (ref 6.4–8.2)
WBC # BLD: 12.6 K/UL (ref 4–11)

## 2019-07-07 PROCEDURE — 2580000003 HC RX 258: Performed by: PHYSICIAN ASSISTANT

## 2019-07-07 PROCEDURE — 80053 COMPREHEN METABOLIC PANEL: CPT

## 2019-07-07 PROCEDURE — 83036 HEMOGLOBIN GLYCOSYLATED A1C: CPT

## 2019-07-07 PROCEDURE — 87040 BLOOD CULTURE FOR BACTERIA: CPT

## 2019-07-07 PROCEDURE — 85610 PROTHROMBIN TIME: CPT

## 2019-07-07 PROCEDURE — 6370000000 HC RX 637 (ALT 250 FOR IP): Performed by: EMERGENCY MEDICINE

## 2019-07-07 PROCEDURE — 99284 EMERGENCY DEPT VISIT MOD MDM: CPT

## 2019-07-07 PROCEDURE — 1200000000 HC SEMI PRIVATE

## 2019-07-07 PROCEDURE — 96365 THER/PROPH/DIAG IV INF INIT: CPT

## 2019-07-07 PROCEDURE — 6360000002 HC RX W HCPCS: Performed by: PHYSICIAN ASSISTANT

## 2019-07-07 PROCEDURE — 83605 ASSAY OF LACTIC ACID: CPT

## 2019-07-07 PROCEDURE — 96367 TX/PROPH/DG ADDL SEQ IV INF: CPT

## 2019-07-07 PROCEDURE — 85025 COMPLETE CBC W/AUTO DIFF WBC: CPT

## 2019-07-07 RX ORDER — PANTOPRAZOLE SODIUM 40 MG/1
40 TABLET, DELAYED RELEASE ORAL
Status: DISCONTINUED | OUTPATIENT
Start: 2019-07-08 | End: 2019-07-11 | Stop reason: HOSPADM

## 2019-07-07 RX ORDER — ZOLPIDEM TARTRATE 5 MG/1
5 TABLET ORAL NIGHTLY PRN
Status: DISCONTINUED | OUTPATIENT
Start: 2019-07-07 | End: 2019-07-11 | Stop reason: HOSPADM

## 2019-07-07 RX ORDER — DEXTROSE MONOHYDRATE 50 MG/ML
100 INJECTION, SOLUTION INTRAVENOUS PRN
Status: DISCONTINUED | OUTPATIENT
Start: 2019-07-07 | End: 2019-07-07 | Stop reason: SDUPTHER

## 2019-07-07 RX ORDER — SODIUM CHLORIDE 0.9 % (FLUSH) 0.9 %
10 SYRINGE (ML) INJECTION PRN
Status: DISCONTINUED | OUTPATIENT
Start: 2019-07-07 | End: 2019-07-11 | Stop reason: HOSPADM

## 2019-07-07 RX ORDER — HYDROCODONE BITARTRATE AND ACETAMINOPHEN 7.5; 325 MG/1; MG/1
1 TABLET ORAL EVERY 4 HOURS PRN
Status: DISCONTINUED | OUTPATIENT
Start: 2019-07-07 | End: 2019-07-11 | Stop reason: HOSPADM

## 2019-07-07 RX ORDER — LISINOPRIL 20 MG/1
20 TABLET ORAL DAILY
Status: DISCONTINUED | OUTPATIENT
Start: 2019-07-08 | End: 2019-07-11 | Stop reason: HOSPADM

## 2019-07-07 RX ORDER — 0.9 % SODIUM CHLORIDE 0.9 %
1000 INTRAVENOUS SOLUTION INTRAVENOUS ONCE
Status: COMPLETED | OUTPATIENT
Start: 2019-07-07 | End: 2019-07-10

## 2019-07-07 RX ORDER — NICOTINE POLACRILEX 4 MG
15 LOZENGE BUCCAL PRN
Status: DISCONTINUED | OUTPATIENT
Start: 2019-07-07 | End: 2019-07-07 | Stop reason: SDUPTHER

## 2019-07-07 RX ORDER — ATORVASTATIN CALCIUM 10 MG/1
10 TABLET, FILM COATED ORAL DAILY
Status: DISCONTINUED | OUTPATIENT
Start: 2019-07-08 | End: 2019-07-11 | Stop reason: HOSPADM

## 2019-07-07 RX ORDER — DEXTROSE MONOHYDRATE 25 G/50ML
12.5 INJECTION, SOLUTION INTRAVENOUS PRN
Status: DISCONTINUED | OUTPATIENT
Start: 2019-07-07 | End: 2019-07-07 | Stop reason: SDUPTHER

## 2019-07-07 RX ORDER — WARFARIN SODIUM 5 MG/1
5 TABLET ORAL
Status: DISCONTINUED | OUTPATIENT
Start: 2019-07-08 | End: 2019-07-11 | Stop reason: HOSPADM

## 2019-07-07 RX ORDER — ASPIRIN 81 MG/1
81 TABLET ORAL DAILY
Status: DISCONTINUED | OUTPATIENT
Start: 2019-07-08 | End: 2019-07-11 | Stop reason: HOSPADM

## 2019-07-07 RX ORDER — VANCOMYCIN HYDROCHLORIDE 1 G/200ML
1000 INJECTION, SOLUTION INTRAVENOUS ONCE
Status: COMPLETED | OUTPATIENT
Start: 2019-07-07 | End: 2019-07-07

## 2019-07-07 RX ORDER — DEXTROSE MONOHYDRATE 25 G/50ML
12.5 INJECTION, SOLUTION INTRAVENOUS PRN
Status: DISCONTINUED | OUTPATIENT
Start: 2019-07-07 | End: 2019-07-11 | Stop reason: HOSPADM

## 2019-07-07 RX ORDER — SODIUM CHLORIDE 0.9 % (FLUSH) 0.9 %
10 SYRINGE (ML) INJECTION EVERY 12 HOURS SCHEDULED
Status: DISCONTINUED | OUTPATIENT
Start: 2019-07-07 | End: 2019-07-11 | Stop reason: HOSPADM

## 2019-07-07 RX ORDER — HYDROCODONE BITARTRATE AND ACETAMINOPHEN 5; 325 MG/1; MG/1
1 TABLET ORAL ONCE
Status: COMPLETED | OUTPATIENT
Start: 2019-07-07 | End: 2019-07-07

## 2019-07-07 RX ORDER — NICOTINE POLACRILEX 4 MG
15 LOZENGE BUCCAL PRN
Status: DISCONTINUED | OUTPATIENT
Start: 2019-07-07 | End: 2019-07-11 | Stop reason: HOSPADM

## 2019-07-07 RX ORDER — 0.9 % SODIUM CHLORIDE 0.9 %
1000 INTRAVENOUS SOLUTION INTRAVENOUS ONCE
Status: COMPLETED | OUTPATIENT
Start: 2019-07-07 | End: 2019-07-07

## 2019-07-07 RX ORDER — DEXTROSE MONOHYDRATE 50 MG/ML
100 INJECTION, SOLUTION INTRAVENOUS PRN
Status: DISCONTINUED | OUTPATIENT
Start: 2019-07-07 | End: 2019-07-11 | Stop reason: HOSPADM

## 2019-07-07 RX ORDER — ONDANSETRON 2 MG/ML
4 INJECTION INTRAMUSCULAR; INTRAVENOUS EVERY 6 HOURS PRN
Status: DISCONTINUED | OUTPATIENT
Start: 2019-07-07 | End: 2019-07-11 | Stop reason: HOSPADM

## 2019-07-07 RX ORDER — WARFARIN SODIUM 2.5 MG/1
2.5 TABLET ORAL
Status: DISCONTINUED | OUTPATIENT
Start: 2019-07-14 | End: 2019-07-11 | Stop reason: HOSPADM

## 2019-07-07 RX ORDER — FUROSEMIDE 40 MG/1
40 TABLET ORAL DAILY
Status: DISCONTINUED | OUTPATIENT
Start: 2019-07-08 | End: 2019-07-11 | Stop reason: HOSPADM

## 2019-07-07 RX ORDER — ACETAMINOPHEN 325 MG/1
650 TABLET ORAL EVERY 4 HOURS PRN
Status: DISCONTINUED | OUTPATIENT
Start: 2019-07-07 | End: 2019-07-11 | Stop reason: HOSPADM

## 2019-07-07 RX ORDER — WARFARIN SODIUM 5 MG/1
5 TABLET ORAL DAILY
Status: DISCONTINUED | OUTPATIENT
Start: 2019-07-07 | End: 2019-07-07 | Stop reason: DRUGHIGH

## 2019-07-07 RX ADMIN — HYDROCODONE BITARTRATE AND ACETAMINOPHEN 1 TABLET: 5; 325 TABLET ORAL at 19:41

## 2019-07-07 RX ADMIN — VANCOMYCIN HYDROCHLORIDE 1000 MG: 1 INJECTION, SOLUTION INTRAVENOUS at 20:15

## 2019-07-07 RX ADMIN — AMPICILLIN SODIUM AND SULBACTAM SODIUM 3 G: 2; 1 INJECTION, POWDER, FOR SOLUTION INTRAMUSCULAR; INTRAVENOUS at 19:43

## 2019-07-07 RX ADMIN — SODIUM CHLORIDE 1000 ML: 9 INJECTION, SOLUTION INTRAVENOUS at 19:43

## 2019-07-07 ASSESSMENT — ENCOUNTER SYMPTOMS
VOMITING: 0
DIARRHEA: 0
COUGH: 0
NAUSEA: 0
RHINORRHEA: 0
COLOR CHANGE: 1
WHEEZING: 0
SHORTNESS OF BREATH: 0
ABDOMINAL PAIN: 0

## 2019-07-07 ASSESSMENT — PAIN SCALES - GENERAL: PAINLEVEL_OUTOF10: 5

## 2019-07-07 NOTE — ED PROVIDER NOTES
andpreliminarily interpreted by the  ED Provider with the below findings:        Interpretation perthe Radiologist below, if available at the time of this note:    No orders to display     Xr Knee Left (3 Views)    Result Date: 7/1/2019  Radiology exam is complete. No Radiologist dictation. Please follow up with ordering provider. Xr Knee Right (3 Views)    Result Date: 7/1/2019  Radiology exam is complete. No Radiologist dictation. Please follow up with ordering provider. PROCEDURES   Unless otherwise noted below, none     Procedures    CRITICAL CARE TIME   N/A    CONSULTS:  IP CONSULT TO HOSPITALIST  IP CONSULT TO PHARMACY  PHARMACY TO DOSE VANCOMYCIN      EMERGENCY DEPARTMENT COURSE and DIFFERENTIAL DIAGNOSIS/MDM:   Vitals:    Vitals:    07/07/19 1838 07/07/19 2017   BP: (!) 128/93 131/76   Pulse: 99 92   Resp: 12 20   Temp: 97.9 °F (36.6 °C)    TempSrc: Infrared    SpO2: 95% 98%   Weight: 295 lb (133.8 kg)    Height: 5' 10\" (1.778 m)        Patient was given thefollowing medications:  Medications   0.9 % sodium chloride bolus (1,000 mLs Intravenous New Bag 7/7/19 1943)   vancomycin (VANCOCIN) 1000 mg in dextrose 5% 200 mL IVPB (1,000 mg Intravenous New Bag 7/7/19 2015)   0.9 % sodium chloride bolus (has no administration in time range)   ampicillin-sulbactam (UNASYN) 3 g in sodium chloride 0.9 % 100 mL IVPB (0 g Intravenous Stopped 7/7/19 2014)   HYDROcodone-acetaminophen (NORCO) 5-325 MG per tablet 1 tablet (1 tablet Oral Given 7/7/19 1941)       Patient presents for evaluation of redness, swelling and tenderness to the left lower leg. On exam, he is well-appearing and in no acute distress. Vitals are stable and he is afebrile. He did lungs are clear to auscultation bilaterally. Abdomen is benign. He does have exquisite tenderness, beefy erythema, warmth and induration to the lateral aspect of the left leg. Circumferential erythema.   Small pustule with no fluctuance or active drainage

## 2019-07-08 ENCOUNTER — TELEPHONE (OUTPATIENT)
Dept: CARDIOLOGY CLINIC | Age: 69
End: 2019-07-08

## 2019-07-08 LAB
C DIFF TOXIN/ANTIGEN: NORMAL
ESTIMATED AVERAGE GLUCOSE: 145.6 MG/DL
GLUCOSE BLD-MCNC: 107 MG/DL (ref 70–99)
GLUCOSE BLD-MCNC: 118 MG/DL (ref 70–99)
GLUCOSE BLD-MCNC: 122 MG/DL (ref 70–99)
GLUCOSE BLD-MCNC: 143 MG/DL (ref 70–99)
GLUCOSE BLD-MCNC: 210 MG/DL (ref 70–99)
HBA1C MFR BLD: 6.7 %
PERFORMED ON: ABNORMAL

## 2019-07-08 PROCEDURE — 6370000000 HC RX 637 (ALT 250 FOR IP): Performed by: FAMILY MEDICINE

## 2019-07-08 PROCEDURE — 87077 CULTURE AEROBIC IDENTIFY: CPT

## 2019-07-08 PROCEDURE — 86403 PARTICLE AGGLUT ANTBDY SCRN: CPT

## 2019-07-08 PROCEDURE — 2580000003 HC RX 258: Performed by: FAMILY MEDICINE

## 2019-07-08 PROCEDURE — 1200000000 HC SEMI PRIVATE

## 2019-07-08 PROCEDURE — 6360000002 HC RX W HCPCS: Performed by: FAMILY MEDICINE

## 2019-07-08 PROCEDURE — 87186 SC STD MICRODIL/AGAR DIL: CPT

## 2019-07-08 PROCEDURE — 87070 CULTURE OTHR SPECIMN AEROBIC: CPT

## 2019-07-08 PROCEDURE — 87324 CLOSTRIDIUM AG IA: CPT

## 2019-07-08 PROCEDURE — 87449 NOS EACH ORGANISM AG IA: CPT

## 2019-07-08 PROCEDURE — 87205 SMEAR GRAM STAIN: CPT

## 2019-07-08 PROCEDURE — 2580000003 HC RX 258

## 2019-07-08 PROCEDURE — 0H9LXZZ DRAINAGE OF LEFT LOWER LEG SKIN, EXTERNAL APPROACH: ICD-10-PCS | Performed by: SURGERY

## 2019-07-08 PROCEDURE — 6370000000 HC RX 637 (ALT 250 FOR IP): Performed by: SURGERY

## 2019-07-08 PROCEDURE — 10061 I&D ABSCESS COMP/MULTIPLE: CPT | Performed by: SURGERY

## 2019-07-08 RX ORDER — BACITRACIN, NEOMYCIN, POLYMYXIN B 400; 3.5; 5 [USP'U]/G; MG/G; [USP'U]/G
OINTMENT TOPICAL 2 TIMES DAILY
Status: DISCONTINUED | OUTPATIENT
Start: 2019-07-08 | End: 2019-07-11 | Stop reason: HOSPADM

## 2019-07-08 RX ORDER — SODIUM CHLORIDE 9 MG/ML
INJECTION, SOLUTION INTRAVENOUS
Status: COMPLETED
Start: 2019-07-08 | End: 2019-07-08

## 2019-07-08 RX ADMIN — VANCOMYCIN HYDROCHLORIDE 1250 MG: 10 INJECTION, POWDER, LYOPHILIZED, FOR SOLUTION INTRAVENOUS at 08:53

## 2019-07-08 RX ADMIN — INSULIN LISPRO 2 UNITS: 100 INJECTION, SOLUTION INTRAVENOUS; SUBCUTANEOUS at 20:50

## 2019-07-08 RX ADMIN — FUROSEMIDE 40 MG: 40 TABLET ORAL at 08:45

## 2019-07-08 RX ADMIN — ATORVASTATIN CALCIUM 10 MG: 10 TABLET, FILM COATED ORAL at 08:44

## 2019-07-08 RX ADMIN — INSULIN GLARGINE 20 UNITS: 100 INJECTION, SOLUTION SUBCUTANEOUS at 20:51

## 2019-07-08 RX ADMIN — Medication 10 ML: at 08:45

## 2019-07-08 RX ADMIN — METOPROLOL TARTRATE 25 MG: 25 TABLET, FILM COATED ORAL at 08:44

## 2019-07-08 RX ADMIN — HYDROCODONE BITARTRATE AND ACETAMINOPHEN 1 TABLET: 7.5; 325 TABLET ORAL at 13:52

## 2019-07-08 RX ADMIN — PANTOPRAZOLE SODIUM 40 MG: 40 TABLET, DELAYED RELEASE ORAL at 06:26

## 2019-07-08 RX ADMIN — HYDROCODONE BITARTRATE AND ACETAMINOPHEN 1 TABLET: 7.5; 325 TABLET ORAL at 00:26

## 2019-07-08 RX ADMIN — HYDROCODONE BITARTRATE AND ACETAMINOPHEN 1 TABLET: 7.5; 325 TABLET ORAL at 18:41

## 2019-07-08 RX ADMIN — HYDROCODONE BITARTRATE AND ACETAMINOPHEN 1 TABLET: 7.5; 325 TABLET ORAL at 06:26

## 2019-07-08 RX ADMIN — BACITRACIN ZINC, NEOMYCIN SULFATE, POLYMYXIN B SULFATE 1 G: 3.5; 5000; 4 OINTMENT TOPICAL at 15:36

## 2019-07-08 RX ADMIN — METOPROLOL TARTRATE 25 MG: 25 TABLET, FILM COATED ORAL at 00:26

## 2019-07-08 RX ADMIN — METOPROLOL TARTRATE 25 MG: 25 TABLET, FILM COATED ORAL at 20:50

## 2019-07-08 RX ADMIN — WARFARIN SODIUM 5 MG: 5 TABLET ORAL at 18:18

## 2019-07-08 RX ADMIN — ASPIRIN 81 MG: 81 TABLET, COATED ORAL at 08:44

## 2019-07-08 RX ADMIN — LISINOPRIL 20 MG: 20 TABLET ORAL at 08:45

## 2019-07-08 RX ADMIN — SODIUM CHLORIDE 250 ML: 9 INJECTION, SOLUTION INTRAVENOUS at 08:53

## 2019-07-08 RX ADMIN — VANCOMYCIN HYDROCHLORIDE 1250 MG: 10 INJECTION, POWDER, LYOPHILIZED, FOR SOLUTION INTRAVENOUS at 20:50

## 2019-07-08 RX ADMIN — BACITRACIN ZINC, NEOMYCIN SULFATE, POLYMYXIN B SULFATE 1 G: 3.5; 5000; 4 OINTMENT TOPICAL at 21:07

## 2019-07-08 RX ADMIN — INSULIN LISPRO 2 UNITS: 100 INJECTION, SOLUTION INTRAVENOUS; SUBCUTANEOUS at 13:05

## 2019-07-08 RX ADMIN — Medication 10 ML: at 00:27

## 2019-07-08 RX ADMIN — Medication 10 ML: at 20:51

## 2019-07-08 ASSESSMENT — PAIN DESCRIPTION - DESCRIPTORS
DESCRIPTORS: ACHING
DESCRIPTORS: ACHING

## 2019-07-08 ASSESSMENT — PAIN SCALES - GENERAL
PAINLEVEL_OUTOF10: 8
PAINLEVEL_OUTOF10: 6
PAINLEVEL_OUTOF10: 5
PAINLEVEL_OUTOF10: 2
PAINLEVEL_OUTOF10: 9
PAINLEVEL_OUTOF10: 3
PAINLEVEL_OUTOF10: 3
PAINLEVEL_OUTOF10: 7

## 2019-07-08 ASSESSMENT — PAIN DESCRIPTION - LOCATION
LOCATION: LEG
LOCATION: LEG

## 2019-07-08 ASSESSMENT — PAIN DESCRIPTION - ORIENTATION
ORIENTATION: LEFT
ORIENTATION: LEFT

## 2019-07-08 NOTE — FLOWSHEET NOTE
Admission assessment complete. Slightly Hypertensive, asymptomatic. Patient resting in bed. Respirations even and easy. Call light in reach. No needs expressed at this time. Will continue to monitor. 07/07/19 2155   Vital Signs   Temp 97.6 °F (36.4 °C)   Temp Source Oral   Pulse 95   Heart Rate Source Monitor   BP (!) 136/95   BP Location Right upper arm   BP Upper/Lower Upper   MAP (mmHg) 109   Patient Position Semi fowlers; Lying right side   Oxygen Therapy   SpO2 95 %   O2 Device None (Room air)

## 2019-07-08 NOTE — PROGRESS NOTES
Shift assessment completed. Routine vitals stable. Scheduled medications given. Patient is awake, alert and oriented. Respirations are easy and unlabored. Patient does not appear to be in distress. Call light within reach.

## 2019-07-08 NOTE — PROGRESS NOTES
Clinical Pharmacist Note:    Pharmacy consulted by Dr. Shayy Camargo to dose warfarin for Bob Che. Goal INR range: 2.0-3.0    INR today: 3.18  Plan to HOLD warfarin for 7/8/19 and then on 7/9/19 resume home dose of 5 mg daily except 2.5 mg on Sundays. Daily INR is ordered. Pharmacy will continue to follow.      Gorge Sandifer PharmD

## 2019-07-08 NOTE — H&P
HOSPITALISTS HISTORY AND PHYSICAL    7/7/2019 10:27 PM    Patient Information:  Sourav Regan is a 77 y.o. male [id number]  PCP:  Miri Villalobos MD (Tel: [phone] )    Chief complaint:    Chief Complaint   Patient presents with    Abscess     pt with abscess to left lower leg with lower leg swelling and redness- states started Sat morning- started on amox by Urgent Care         History of Present Illness:  Felicity Granados is a 77 y.o. male with h/o DM , . Afib AC with coumadin, obesity presents worsening red ness and swelling of left leg . He noticed  A bump like that of a bug bite one day ago. He was seen at the urgent care and was started on amoxicillin. But symptoms worsened. Denies fever, chills, n/v/d/c/    REVIEW OF SYSTEMS:   Constitutional: Negative for fever,chills or night sweats  ENT: Negative for rhinorrhea, epistaxis, hoarseness, sore throat. Respiratory: Negative for shortness of breath,wheezing  Cardiovascular: Negative for chest pain, palpitations   Gastrointestinal: Negative for nausea, vomiting, diarrhea  Genitourinary: Negative for polyuria, dysuria   Hematologic/Lymphatic: Negative for bleeding tendency, easy bruising  Musculoskeletal: Negative for myalgias and arthralgias  Neurologic: Negative for confusion,dysarthria. Skin: Negative for itching,rash  Psychiatric: Negative for depression,anxiety, agitation. Endocrine: Negative for polydipsia,polyuria,heat /cold intolerance. Past Medical History:   has a past medical history of Atrial fibrillation ([facility] [address] CAD (coronary artery disease), Cancer ([facility] [address] Diabetes mellitus ([facility] [address] Hypercholesteremia, Hypertension, Obesity, Sleep apnea, and Ulcer of pyloric antrum. Past Surgical History:   has a past surgical history that includes Diagnostic Cardiac Cath Lab Procedure (8/24/06); knee surgery; Colonoscopy;  Upper gastrointestinal endoscopy (02/07/14); cyst removal; and Upper lower extremities  Respiratory: Clear to auscultation bilaterally, no wheeze, good inspiratory effort  Gastrointestinal: Abdomen soft, non-tender, not distended, normal bowel sounds  Musculoskeletal: No cyanosis in digits, neck supple  Neurology: Cranial nerves grossly intact. Alert and oriented in time, place and person. No speech or motor deficits  Psychiatry: Appropriate affect. Not agitated  Skin: erythema, edema and warmth of left lateral lower extremity with puncture wound    Labs:  CBC:   Lab Results   Component Value Date    WBC 12.6 07/07/2019    RBC 4.69 07/07/2019    HGB 12.7 07/07/2019    HCT 39.4 07/07/2019    MCV 84.0 07/07/2019    MCH 27.0 07/07/2019    MCHC 32.2 07/07/2019    RDW 16.4 07/07/2019     07/07/2019    MPV 8.7 07/07/2019     BMP:    Lab Results   Component Value Date     07/07/2019    K 4.5 07/07/2019     07/07/2019    CO2 24 07/07/2019    BUN 25 07/07/2019    CREATININE 1.3 07/07/2019    CALCIUM 8.9 07/07/2019    GFRAA >60 07/07/2019    GFRAA >60 02/11/2012    LABGLOM 55 07/07/2019    GLUCOSE 226 07/07/2019       Chest Xray:   EKG:    I visualized CXR images and EKG      Problem List  Active Problems:    Cellulitis  Resolved Problems:    * No resolved hospital problems. *        Assessment/Plan:         1. Cellulitis of the left lower extremity  Failed outpt treatment with Augmentin  Mild leukocytosis 12.6 K    Will treat with IV vanc and cefepime   tolerated cefepime  Pain control  Follow cultures    Type 2 DM hyperglycemia  Treat with LAntus and sliding scale insulin  Carb control diet   The pt takes metformin at home    Afib [address] with coumadin  INR is supratherapeutic  Pharmacy to dose    Obesity BMI 42  Admit as inpatient. I anticipate hospitalization spanning more than two midnights for investigation and treatment of the above medically necessary diagnoses.       Gaston Bower MD    7/7/2019 10:27 PM

## 2019-07-08 NOTE — PROGRESS NOTES
[facility]    7/8/2019 7:52 AM        Name: Pina Morton . Admitted: 7/7/2019  Primary Care Provider: Devan Mora MD (Tel: [phone])                        Hospital course:  Pina Morton is a 77 y.o. male with h/o DM , . Afib AC with coumadin, obesity presents worsening red ness and swelling of left leg . He noticed  A bump like that of a bug bite one day ago. He was seen at the urgent care and was started on amoxicillin. But symptoms worsened, patient admitted for failed outpatient treatment cefepime and vancomycin started. Subjective: History at the bedside, patient complained about left lower leg swelling and pain     No acute events overnight. Resting well. Pain control. Diet ok. Labs reviewed  Denies any chest pain sob.      Reviewed interval ancillary notes    Current Medications    0.9 % sodium chloride bolus Once   aspirin EC tablet 81 mg Daily   atorvastatin (LIPITOR) tablet 10 mg Daily   furosemide (LASIX) tablet 40 mg Daily   lisinopril (PRINIVIL;ZESTRIL) tablet 20 mg Daily   metoprolol tartrate (LOPRESSOR) tablet 25 mg BID   pantoprazole (PROTONIX) tablet 40 mg QAM AC   zolpidem (AMBIEN) tablet 5 mg Nightly PRN   sodium chloride flush 0.9 % injection 10 mL 2 times per day   sodium chloride flush 0.9 % injection 10 mL PRN   magnesium hydroxide (MILK OF MAGNESIA) 400 MG/5ML suspension 30 mL Daily PRN   ondansetron (ZOFRAN) injection 4 mg Q6H PRN   acetaminophen (TYLENOL) tablet 650 mg Q4H PRN   HYDROcodone-acetaminophen (NORCO) 7.5-325 MG per tablet 1 tablet Q4H PRN   vancomycin (VANCOCIN) 1,250 mg in dextrose 5 % 250 mL IVPB Q12H   glucose (GLUTOSE) 40 % oral gel 15 g PRN   dextrose 50 % IV solution PRN   glucagon (rDNA) injection 1 mg PRN   dextrose 5 % solution PRN   insulin glargine (LANTUS) injection pen 20 Units Nightly   insulin

## 2019-07-08 NOTE — PROGRESS NOTES
Routine vitals stable. Scheduled medication given. Patient denies any needs. Call light within reach.

## 2019-07-09 LAB
ANION GAP SERPL CALCULATED.3IONS-SCNC: 7 MMOL/L (ref 3–16)
BUN BLDV-MCNC: 16 MG/DL (ref 7–20)
CALCIUM SERPL-MCNC: 9 MG/DL (ref 8.3–10.6)
CHLORIDE BLD-SCNC: 102 MMOL/L (ref 99–110)
CO2: 28 MMOL/L (ref 21–32)
CREAT SERPL-MCNC: 0.8 MG/DL (ref 0.8–1.3)
GFR AFRICAN AMERICAN: >60
GFR NON-AFRICAN AMERICAN: >60
GLUCOSE BLD-MCNC: 102 MG/DL (ref 70–99)
GLUCOSE BLD-MCNC: 112 MG/DL (ref 70–99)
GLUCOSE BLD-MCNC: 142 MG/DL (ref 70–99)
GLUCOSE BLD-MCNC: 196 MG/DL (ref 70–99)
GLUCOSE BLD-MCNC: 90 MG/DL (ref 70–99)
GLUCOSE BLD-MCNC: 96 MG/DL (ref 70–99)
HCT VFR BLD CALC: 38.2 % (ref 40.5–52.5)
HEMOGLOBIN: 12.6 G/DL (ref 13.5–17.5)
INR BLD: 2.92 (ref 0.86–1.14)
MAGNESIUM: 1.8 MG/DL (ref 1.8–2.4)
MCH RBC QN AUTO: 27.4 PG (ref 26–34)
MCHC RBC AUTO-ENTMCNC: 33 G/DL (ref 31–36)
MCV RBC AUTO: 83 FL (ref 80–100)
PDW BLD-RTO: 16.6 % (ref 12.4–15.4)
PERFORMED ON: ABNORMAL
PERFORMED ON: NORMAL
PERFORMED ON: NORMAL
PHOSPHORUS: 3.2 MG/DL (ref 2.5–4.9)
PLATELET # BLD: 249 K/UL (ref 135–450)
PMV BLD AUTO: 8.3 FL (ref 5–10.5)
POTASSIUM SERPL-SCNC: 4.5 MMOL/L (ref 3.5–5.1)
PROTHROMBIN TIME: 33.3 SEC (ref 9.8–13)
RBC # BLD: 4.6 M/UL (ref 4.2–5.9)
SODIUM BLD-SCNC: 137 MMOL/L (ref 136–145)
VANCOMYCIN TROUGH: 12.6 UG/ML (ref 10–20)
WBC # BLD: 9.6 K/UL (ref 4–11)

## 2019-07-09 PROCEDURE — 6370000000 HC RX 637 (ALT 250 FOR IP): Performed by: SURGERY

## 2019-07-09 PROCEDURE — 2580000003 HC RX 258

## 2019-07-09 PROCEDURE — 1200000000 HC SEMI PRIVATE

## 2019-07-09 PROCEDURE — 83735 ASSAY OF MAGNESIUM: CPT

## 2019-07-09 PROCEDURE — 87641 MR-STAPH DNA AMP PROBE: CPT

## 2019-07-09 PROCEDURE — 85610 PROTHROMBIN TIME: CPT

## 2019-07-09 PROCEDURE — 94760 N-INVAS EAR/PLS OXIMETRY 1: CPT

## 2019-07-09 PROCEDURE — 6370000000 HC RX 637 (ALT 250 FOR IP): Performed by: FAMILY MEDICINE

## 2019-07-09 PROCEDURE — 84100 ASSAY OF PHOSPHORUS: CPT

## 2019-07-09 PROCEDURE — 36415 COLL VENOUS BLD VENIPUNCTURE: CPT

## 2019-07-09 PROCEDURE — 80048 BASIC METABOLIC PNL TOTAL CA: CPT

## 2019-07-09 PROCEDURE — 6360000002 HC RX W HCPCS: Performed by: FAMILY MEDICINE

## 2019-07-09 PROCEDURE — 80202 ASSAY OF VANCOMYCIN: CPT

## 2019-07-09 PROCEDURE — 2580000003 HC RX 258: Performed by: FAMILY MEDICINE

## 2019-07-09 PROCEDURE — 85027 COMPLETE CBC AUTOMATED: CPT

## 2019-07-09 RX ORDER — SODIUM CHLORIDE 9 MG/ML
INJECTION, SOLUTION INTRAVENOUS
Status: COMPLETED
Start: 2019-07-09 | End: 2019-07-09

## 2019-07-09 RX ADMIN — METOPROLOL TARTRATE 25 MG: 25 TABLET, FILM COATED ORAL at 22:28

## 2019-07-09 RX ADMIN — PANTOPRAZOLE SODIUM 40 MG: 40 TABLET, DELAYED RELEASE ORAL at 06:25

## 2019-07-09 RX ADMIN — Medication 10 ML: at 22:29

## 2019-07-09 RX ADMIN — FUROSEMIDE 40 MG: 40 TABLET ORAL at 09:13

## 2019-07-09 RX ADMIN — VANCOMYCIN HYDROCHLORIDE 1250 MG: 10 INJECTION, POWDER, LYOPHILIZED, FOR SOLUTION INTRAVENOUS at 22:31

## 2019-07-09 RX ADMIN — INSULIN GLARGINE 20 UNITS: 100 INJECTION, SOLUTION SUBCUTANEOUS at 22:36

## 2019-07-09 RX ADMIN — HYDROCODONE BITARTRATE AND ACETAMINOPHEN 1 TABLET: 7.5; 325 TABLET ORAL at 22:28

## 2019-07-09 RX ADMIN — METOPROLOL TARTRATE 25 MG: 25 TABLET, FILM COATED ORAL at 09:14

## 2019-07-09 RX ADMIN — WARFARIN SODIUM 5 MG: 5 TABLET ORAL at 16:40

## 2019-07-09 RX ADMIN — Medication 10 ML: at 09:14

## 2019-07-09 RX ADMIN — ATORVASTATIN CALCIUM 10 MG: 10 TABLET, FILM COATED ORAL at 09:13

## 2019-07-09 RX ADMIN — ASPIRIN 81 MG: 81 TABLET, COATED ORAL at 09:13

## 2019-07-09 RX ADMIN — HYDROCODONE BITARTRATE AND ACETAMINOPHEN 1 TABLET: 7.5; 325 TABLET ORAL at 16:40

## 2019-07-09 RX ADMIN — INSULIN LISPRO 1 UNITS: 100 INJECTION, SOLUTION INTRAVENOUS; SUBCUTANEOUS at 22:36

## 2019-07-09 RX ADMIN — HYDROCODONE BITARTRATE AND ACETAMINOPHEN 1 TABLET: 7.5; 325 TABLET ORAL at 00:27

## 2019-07-09 RX ADMIN — HYDROCODONE BITARTRATE AND ACETAMINOPHEN 1 TABLET: 7.5; 325 TABLET ORAL at 06:25

## 2019-07-09 RX ADMIN — VANCOMYCIN HYDROCHLORIDE 1250 MG: 10 INJECTION, POWDER, LYOPHILIZED, FOR SOLUTION INTRAVENOUS at 09:14

## 2019-07-09 RX ADMIN — LISINOPRIL 20 MG: 20 TABLET ORAL at 09:13

## 2019-07-09 RX ADMIN — BACITRACIN ZINC, NEOMYCIN SULFATE, POLYMYXIN B SULFATE 1 G: 3.5; 5000; 4 OINTMENT TOPICAL at 09:17

## 2019-07-09 RX ADMIN — SODIUM CHLORIDE 250 ML: 9 INJECTION, SOLUTION INTRAVENOUS at 09:17

## 2019-07-09 ASSESSMENT — PAIN SCALES - GENERAL
PAINLEVEL_OUTOF10: 7
PAINLEVEL_OUTOF10: 6
PAINLEVEL_OUTOF10: 3

## 2019-07-09 ASSESSMENT — PAIN DESCRIPTION - LOCATION
LOCATION: LEG
LOCATION: LEG

## 2019-07-09 ASSESSMENT — PAIN DESCRIPTION - PAIN TYPE: TYPE: ACUTE PAIN

## 2019-07-09 ASSESSMENT — PAIN DESCRIPTION - PROGRESSION: CLINICAL_PROGRESSION: GRADUALLY WORSENING

## 2019-07-09 ASSESSMENT — PAIN - FUNCTIONAL ASSESSMENT: PAIN_FUNCTIONAL_ASSESSMENT: PREVENTS OR INTERFERES WITH MANY ACTIVE NOT PASSIVE ACTIVITIES

## 2019-07-09 ASSESSMENT — PAIN DESCRIPTION - DESCRIPTORS
DESCRIPTORS: ACHING
DESCRIPTORS: ACHING;BURNING

## 2019-07-09 ASSESSMENT — PAIN DESCRIPTION - ONSET: ONSET: ON-GOING

## 2019-07-09 ASSESSMENT — PAIN DESCRIPTION - FREQUENCY: FREQUENCY: CONTINUOUS

## 2019-07-09 ASSESSMENT — PAIN DESCRIPTION - ORIENTATION
ORIENTATION: LEFT
ORIENTATION: LEFT

## 2019-07-09 NOTE — PROGRESS NOTES
Shift assessment completed. Routine vitals stable. Scheduled medications given. Patient is awake, alert and oriented. Respirations are easy and unlabored. Patient does not appear to be in distress. Neosporin applied to LLE, this RN found new blister next to previous blister. Patient denies any needs. Call light within reach.

## 2019-07-10 LAB
GLUCOSE BLD-MCNC: 107 MG/DL (ref 70–99)
GLUCOSE BLD-MCNC: 159 MG/DL (ref 70–99)
GLUCOSE BLD-MCNC: 174 MG/DL (ref 70–99)
GLUCOSE BLD-MCNC: 229 MG/DL (ref 70–99)
GLUCOSE BLD-MCNC: 92 MG/DL (ref 70–99)
INR BLD: 2.53 (ref 0.86–1.14)
MRSA SCREEN RT-PCR: ABNORMAL
MRSA SCREEN RT-PCR: ABNORMAL
ORGANISM: ABNORMAL
PERFORMED ON: ABNORMAL
PERFORMED ON: NORMAL
PROTHROMBIN TIME: 28.8 SEC (ref 9.8–13)

## 2019-07-10 PROCEDURE — 87205 SMEAR GRAM STAIN: CPT

## 2019-07-10 PROCEDURE — 87070 CULTURE OTHR SPECIMN AEROBIC: CPT

## 2019-07-10 PROCEDURE — 85610 PROTHROMBIN TIME: CPT

## 2019-07-10 PROCEDURE — 36415 COLL VENOUS BLD VENIPUNCTURE: CPT

## 2019-07-10 PROCEDURE — 94760 N-INVAS EAR/PLS OXIMETRY 1: CPT

## 2019-07-10 PROCEDURE — 6360000002 HC RX W HCPCS: Performed by: FAMILY MEDICINE

## 2019-07-10 PROCEDURE — 6370000000 HC RX 637 (ALT 250 FOR IP): Performed by: FAMILY MEDICINE

## 2019-07-10 PROCEDURE — 6370000000 HC RX 637 (ALT 250 FOR IP): Performed by: SURGERY

## 2019-07-10 PROCEDURE — 1200000000 HC SEMI PRIVATE

## 2019-07-10 PROCEDURE — 2580000003 HC RX 258: Performed by: PHYSICIAN ASSISTANT

## 2019-07-10 PROCEDURE — 2580000003 HC RX 258: Performed by: FAMILY MEDICINE

## 2019-07-10 PROCEDURE — 2580000003 HC RX 258

## 2019-07-10 RX ORDER — SODIUM CHLORIDE 9 MG/ML
INJECTION, SOLUTION INTRAVENOUS
Status: COMPLETED
Start: 2019-07-10 | End: 2019-07-10

## 2019-07-10 RX ADMIN — ASPIRIN 81 MG: 81 TABLET, COATED ORAL at 08:48

## 2019-07-10 RX ADMIN — INSULIN LISPRO 2 UNITS: 100 INJECTION, SOLUTION INTRAVENOUS; SUBCUTANEOUS at 20:11

## 2019-07-10 RX ADMIN — VANCOMYCIN HYDROCHLORIDE 1250 MG: 10 INJECTION, POWDER, LYOPHILIZED, FOR SOLUTION INTRAVENOUS at 10:54

## 2019-07-10 RX ADMIN — WARFARIN SODIUM 5 MG: 5 TABLET ORAL at 17:32

## 2019-07-10 RX ADMIN — SODIUM CHLORIDE: 9 INJECTION, SOLUTION INTRAVENOUS at 10:59

## 2019-07-10 RX ADMIN — ZOLPIDEM TARTRATE 5 MG: 5 TABLET ORAL at 00:51

## 2019-07-10 RX ADMIN — INSULIN GLARGINE 20 UNITS: 100 INJECTION, SOLUTION SUBCUTANEOUS at 20:11

## 2019-07-10 RX ADMIN — BACITRACIN ZINC, NEOMYCIN SULFATE, POLYMYXIN B SULFATE 1 G: 3.5; 5000; 4 OINTMENT TOPICAL at 00:51

## 2019-07-10 RX ADMIN — Medication 10 ML: at 10:54

## 2019-07-10 RX ADMIN — ATORVASTATIN CALCIUM 10 MG: 10 TABLET, FILM COATED ORAL at 08:48

## 2019-07-10 RX ADMIN — PANTOPRAZOLE SODIUM 40 MG: 40 TABLET, DELAYED RELEASE ORAL at 05:43

## 2019-07-10 RX ADMIN — BACITRACIN ZINC, NEOMYCIN SULFATE, POLYMYXIN B SULFATE: 3.5; 5000; 4 OINTMENT TOPICAL at 22:00

## 2019-07-10 RX ADMIN — HYDROCODONE BITARTRATE AND ACETAMINOPHEN 1 TABLET: 7.5; 325 TABLET ORAL at 05:43

## 2019-07-10 RX ADMIN — Medication 10 ML: at 20:11

## 2019-07-10 RX ADMIN — HYDROCODONE BITARTRATE AND ACETAMINOPHEN 1 TABLET: 7.5; 325 TABLET ORAL at 14:40

## 2019-07-10 RX ADMIN — LISINOPRIL 20 MG: 20 TABLET ORAL at 08:48

## 2019-07-10 RX ADMIN — HYDROCODONE BITARTRATE AND ACETAMINOPHEN 1 TABLET: 7.5; 325 TABLET ORAL at 20:10

## 2019-07-10 RX ADMIN — Medication 10 ML: at 08:49

## 2019-07-10 RX ADMIN — FUROSEMIDE 40 MG: 40 TABLET ORAL at 08:49

## 2019-07-10 RX ADMIN — SODIUM CHLORIDE 1000 ML: 9 INJECTION, SOLUTION INTRAVENOUS at 10:54

## 2019-07-10 RX ADMIN — METOPROLOL TARTRATE 25 MG: 25 TABLET, FILM COATED ORAL at 08:49

## 2019-07-10 RX ADMIN — VANCOMYCIN HYDROCHLORIDE 1250 MG: 10 INJECTION, POWDER, LYOPHILIZED, FOR SOLUTION INTRAVENOUS at 22:40

## 2019-07-10 RX ADMIN — METOPROLOL TARTRATE 25 MG: 25 TABLET, FILM COATED ORAL at 20:10

## 2019-07-10 ASSESSMENT — PAIN SCALES - GENERAL
PAINLEVEL_OUTOF10: 2
PAINLEVEL_OUTOF10: 6
PAINLEVEL_OUTOF10: 6
PAINLEVEL_OUTOF10: 8
PAINLEVEL_OUTOF10: 0
PAINLEVEL_OUTOF10: 4
PAINLEVEL_OUTOF10: 3

## 2019-07-10 ASSESSMENT — PAIN DESCRIPTION - LOCATION
LOCATION: LEG

## 2019-07-10 ASSESSMENT — PAIN DESCRIPTION - PROGRESSION: CLINICAL_PROGRESSION: GRADUALLY WORSENING

## 2019-07-10 ASSESSMENT — PAIN DESCRIPTION - PAIN TYPE
TYPE: ACUTE PAIN

## 2019-07-10 ASSESSMENT — PAIN DESCRIPTION - ORIENTATION
ORIENTATION: LEFT

## 2019-07-10 ASSESSMENT — PAIN - FUNCTIONAL ASSESSMENT: PAIN_FUNCTIONAL_ASSESSMENT: PREVENTS OR INTERFERES SOME ACTIVE ACTIVITIES AND ADLS

## 2019-07-10 ASSESSMENT — PAIN DESCRIPTION - DESCRIPTORS: DESCRIPTORS: ACHING;BURNING

## 2019-07-10 ASSESSMENT — PAIN DESCRIPTION - FREQUENCY: FREQUENCY: CONTINUOUS

## 2019-07-10 ASSESSMENT — PAIN DESCRIPTION - ONSET: ONSET: ON-GOING

## 2019-07-10 NOTE — PROGRESS NOTES
Pt. Resting quietly; eyes closed. Respirations even, unlabored, and easy. Call light/table in reach. Will continue to monitor.

## 2019-07-10 NOTE — PROGRESS NOTES
LLE more red compared to this morning. Area now hot to touch. Surgery team called. Patient reports LLE more \"tender to touch\".

## 2019-07-10 NOTE — PROGRESS NOTES
Intake/Output Summary (Last 24 hours) at 7/10/2019 0811  Last data filed at 7/9/2019 0917  Gross per 24 hour   Intake 360 ml   Output --   Net 360 ml      Wt Readings from Last 3 Encounters:   07/07/19 294 lb 6.4 oz (133.5 kg)   07/01/19 290 lb (131.5 kg)   06/13/19 298 lb 11.2 oz (135.5 kg)       General appearance:  Appears comfortable, morbidly obese   Eyes: Sclera clear. Pupils equal.  ENT: Moist oral mucosa. Trachea midline, no adenopathy. Cardiovascular: Regular rhythm, normal S1, S2. No murmur. Trace edema in lower extremities  Respiratory: Not using accessory muscles. Good inspiratory effort. Clear to auscultation bilaterally, no wheeze or crackles. GI: Abdomen soft, no tenderness, not distended, normal bowel sounds  Musculoskeletal: No cyanosis in digits, neck supple  Neurology: CN 2-12 grossly intact. No speech or motor deficits  Psych: Normal affect. Alert and oriented in time, place and person  Skin: Warm, dry, bilat lower leg skin staining noted. Redness noted over left lateral ankle , 3 new blisters have formed over the lateral aspect of the left ankle. I did un roof a lesion and send purulent material for culture. Labs and Tests:  CBC:   Recent Labs     07/07/19 1848 07/09/19  0720   WBC 12.6* 9.6   HGB 12.7* 12.6*    249     BMP:    Recent Labs     07/07/19 1848 07/09/19  0720   * 137   K 4.5 4.5    102   CO2 24 28   BUN 25* 16   CREATININE 1.3 0.8   GLUCOSE 226* 112*     Hepatic:   Recent Labs     07/07/19  1848   AST 18   ALT 17   BILITOT 0.4   ALKPHOS 89     Results for Yesy Shea (MRN [id number]) as of 7/10/2019 16:28   Ref. Range 7/9/2019 20:36 7/9/2019 22:35 7/10/2019 08:28 7/10/2019 12:11   POC Glucose Latest Ref Range: 70 - 99 mg/dl 196 (H) 142 (H) 92 159 (H)     AIC 6.7 %    Problem List  Active Problems:    Cellulitis  Resolved Problems:    * No resolved hospital problems. *       Assessment & Plan:   1.  Left leg cellulitis:  Currently on Vanc   ( failed brief outpatient therapy with amoxicillin)  I sent additional culture. Will likely be MRSA   2. T2DM with adequate bg control with AIC 6.7:  Continue with current therapy of lantus 20 units at hs with humalog correction as needed. Will resume home metformin at time of d/c   3. HTN:  bp control in range  4. Atrial fib:  Rate controlled, AC with coumadin. INR therapeutic ,  RX is dosing   5. Cellulitis is likely accentuated by his diabetes,   Lower leg edema is also a contributing factor   6. Morbid obesity:   Pt working on weight loss. Has lost 30 lbs in the past 2 months.    7. Could consider d/c home in the am if cultures/ sensitivities complete         Diet: DIET CARB CONTROL;  Code:Full Code  DVT PPX      SHAMAR Magana CNP   7/10/2019 8:11 AM

## 2019-07-11 VITALS
HEART RATE: 86 BPM | DIASTOLIC BLOOD PRESSURE: 87 MMHG | RESPIRATION RATE: 16 BRPM | SYSTOLIC BLOOD PRESSURE: 124 MMHG | BODY MASS INDEX: 42.15 KG/M2 | HEIGHT: 70 IN | TEMPERATURE: 97.9 F | OXYGEN SATURATION: 96 % | WEIGHT: 294.4 LBS

## 2019-07-11 LAB
GLUCOSE BLD-MCNC: 135 MG/DL (ref 70–99)
GLUCOSE BLD-MCNC: 89 MG/DL (ref 70–99)
GRAM STAIN RESULT: ABNORMAL
INR BLD: 2.34 (ref 0.86–1.14)
ORGANISM: ABNORMAL
PERFORMED ON: ABNORMAL
PERFORMED ON: NORMAL
PROTHROMBIN TIME: 26.7 SEC (ref 9.8–13)
WOUND/ABSCESS: ABNORMAL
WOUND/ABSCESS: ABNORMAL

## 2019-07-11 PROCEDURE — 6370000000 HC RX 637 (ALT 250 FOR IP): Performed by: FAMILY MEDICINE

## 2019-07-11 PROCEDURE — 2580000003 HC RX 258: Performed by: FAMILY MEDICINE

## 2019-07-11 PROCEDURE — 10061 I&D ABSCESS COMP/MULTIPLE: CPT | Performed by: SURGERY

## 2019-07-11 PROCEDURE — 85610 PROTHROMBIN TIME: CPT

## 2019-07-11 PROCEDURE — 36415 COLL VENOUS BLD VENIPUNCTURE: CPT

## 2019-07-11 RX ORDER — HYDROCODONE BITARTRATE AND ACETAMINOPHEN 7.5; 325 MG/1; MG/1
1 TABLET ORAL EVERY 6 HOURS PRN
Qty: 12 TABLET | Refills: 0 | Status: SHIPPED | OUTPATIENT
Start: 2019-07-11 | End: 2019-07-14

## 2019-07-11 RX ORDER — WARFARIN SODIUM 5 MG/1
TABLET ORAL
Qty: 30 TABLET | Refills: 3 | Status: SHIPPED | OUTPATIENT
Start: 2019-07-11

## 2019-07-11 RX ORDER — SULFAMETHOXAZOLE AND TRIMETHOPRIM 800; 160 MG/1; MG/1
1 TABLET ORAL 2 TIMES DAILY
Qty: 20 TABLET | Refills: 0 | Status: SHIPPED | OUTPATIENT
Start: 2019-07-11 | End: 2019-07-21

## 2019-07-11 RX ADMIN — ASPIRIN 81 MG: 81 TABLET, COATED ORAL at 09:16

## 2019-07-11 RX ADMIN — METOPROLOL TARTRATE 25 MG: 25 TABLET, FILM COATED ORAL at 09:07

## 2019-07-11 RX ADMIN — Medication 10 ML: at 09:08

## 2019-07-11 RX ADMIN — FUROSEMIDE 40 MG: 40 TABLET ORAL at 09:07

## 2019-07-11 RX ADMIN — PANTOPRAZOLE SODIUM 40 MG: 40 TABLET, DELAYED RELEASE ORAL at 05:47

## 2019-07-11 RX ADMIN — ZOLPIDEM TARTRATE 5 MG: 5 TABLET ORAL at 01:54

## 2019-07-11 RX ADMIN — LISINOPRIL 20 MG: 20 TABLET ORAL at 09:07

## 2019-07-11 RX ADMIN — ATORVASTATIN CALCIUM 10 MG: 10 TABLET, FILM COATED ORAL at 09:07

## 2019-07-11 NOTE — PROGRESS NOTES
Discharge instructions reviewed with patient, who verbalized understanding. IV removed, no complications. Belongings and prescriptions in hand. Patient left unit in stable condition by wheelchair.

## 2019-07-11 NOTE — PROGRESS NOTES
LLE cleansed with hibiclens at this time and left open to air. Patient tolerated well. Denies needs at this time. Will monitor.

## 2019-07-12 LAB
BLOOD CULTURE, ROUTINE: NORMAL
CULTURE, BLOOD 2: NORMAL
GRAM STAIN RESULT: ABNORMAL
ORGANISM: ABNORMAL
WOUND/ABSCESS: ABNORMAL
WOUND/ABSCESS: ABNORMAL

## 2019-07-16 ENCOUNTER — TELEPHONE (OUTPATIENT)
Dept: PHARMACY | Age: 69
End: 2019-07-16

## 2019-07-17 ENCOUNTER — HOSPITAL ENCOUNTER (OUTPATIENT)
Dept: WOUND CARE | Age: 69
Discharge: HOME | End: 2019-07-17
Payer: COMMERCIAL

## 2019-07-17 ENCOUNTER — ANTI-COAG VISIT (OUTPATIENT)
Dept: PHARMACY | Age: 69
End: 2019-07-17
Payer: COMMERCIAL

## 2019-07-17 VITALS
TEMPERATURE: 98 F | BODY MASS INDEX: 41.25 KG/M2 | SYSTOLIC BLOOD PRESSURE: 124 MMHG | WEIGHT: 287.5 LBS | HEART RATE: 74 BPM | RESPIRATION RATE: 16 BRPM | DIASTOLIC BLOOD PRESSURE: 89 MMHG

## 2019-07-17 DIAGNOSIS — I48.91 ATRIAL FIBRILLATION WITH RAPID VENTRICULAR RESPONSE (HCC): ICD-10-CM

## 2019-07-17 LAB — INTERNATIONAL NORMALIZATION RATIO, POC: 2.7

## 2019-07-17 PROCEDURE — 99213 OFFICE O/P EST LOW 20 MIN: CPT

## 2019-07-17 PROCEDURE — 11042 DBRDMT SUBQ TIS 1ST 20SQCM/<: CPT | Performed by: SURGERY

## 2019-07-17 PROCEDURE — 99211 OFF/OP EST MAY X REQ PHY/QHP: CPT

## 2019-07-17 PROCEDURE — 11042 DBRDMT SUBQ TIS 1ST 20SQCM/<: CPT

## 2019-07-17 PROCEDURE — 85610 PROTHROMBIN TIME: CPT

## 2019-07-17 RX ORDER — LIDOCAINE 40 MG/G
CREAM TOPICAL ONCE
Status: DISCONTINUED | OUTPATIENT
Start: 2019-07-17 | End: 2019-07-18 | Stop reason: HOSPADM

## 2019-07-17 ASSESSMENT — PAIN DESCRIPTION - LOCATION: LOCATION: LEG

## 2019-07-17 ASSESSMENT — PAIN DESCRIPTION - FREQUENCY: FREQUENCY: INTERMITTENT

## 2019-07-17 ASSESSMENT — PAIN DESCRIPTION - ORIENTATION: ORIENTATION: LEFT

## 2019-07-17 ASSESSMENT — PAIN DESCRIPTION - PAIN TYPE: TYPE: ACUTE PAIN

## 2019-07-17 ASSESSMENT — PAIN SCALES - GENERAL: PAINLEVEL_OUTOF10: 2

## 2019-07-17 NOTE — PROGRESS NOTES
[address] Procedure Note    Lawrence Rodriguez  AGE: 77 y.o. GENDER: male    : 1950  TODAY'S DATE: 2019    Chief Complaint   Patient presents with    Wound Check     Left leg wound 2 weeks. In hospital last week. History of Present Illness     Jamal Montano is a 77 y.o. male who presents today for wound evaluation. History of Wound: non-healing surgical wound located on the left leg. Incision and drainage of abscess, MRSA  Wound Pain:  mild  Severity:  3 / 10   Wound Type:  non-healing surgical  Modifying Factors:  diabetes and anticoagulation therapy  Associated Signs/Symptoms:  pain    Procedure Note:     Performed by: Adal Goodson MD    Consent obtained: Yes    Time out taken: Yes    Pain Control: Anesthetic  Anesthetic: Other (comment)(4% lidocaine cream)     Debridement: Excisional Debridement    Using curette the wound was sharply debrided    down through and including the removal of epidermis, dermis and subcutaneous tissue.         Devitalized Tissue Debrided: fibrin, biofilm and slough    Pre Debridement Measurements:  Are located in the Wound Documentation Flow Sheet     Wound #: 1     Post  Debridement Measurements:  Wound 19 Leg Left;Lateral #1 (Active)   Wound Image   2019  9:37 AM   Wound Venous 2019  9:37 AM   Wound Cleansed Rinsed/Irrigated with saline 2019 10:01 AM   Wound Length (cm) 0.3 cm 2019  9:37 AM   Wound Width (cm) 0.3 cm 2019  9:37 AM   Wound Depth (cm) 0.1 cm 2019  9:37 AM   Wound Surface Area (cm^2) 0.09 cm^2 2019  9:37 AM   Wound Volume (cm^3) 0.01 cm^3 2019  9:37 AM   Post-Procedure Length (cm) 0.3 cm 2019 10:01 AM   Post-Procedure Width (cm) 0.3 cm 2019 10:01 AM   Post-Procedure Depth (cm) 0.1 cm 2019 10:01 AM   Post-Procedure Surface Area (cm^2) 0.09 cm^2 2019 10:01 AM   Post-Procedure Volume (cm^3) 0.01 cm^3 2019 10:01 AM   Wound Assessment Bleeding 2019

## 2019-07-24 ENCOUNTER — HOSPITAL ENCOUNTER (OUTPATIENT)
Dept: WOUND CARE | Age: 69
Discharge: HOME | End: 2019-07-24
Payer: COMMERCIAL

## 2019-07-24 VITALS
SYSTOLIC BLOOD PRESSURE: 120 MMHG | TEMPERATURE: 35.6 F | RESPIRATION RATE: 16 BRPM | DIASTOLIC BLOOD PRESSURE: 73 MMHG | HEART RATE: 79 BPM

## 2019-07-24 PROCEDURE — 99212 OFFICE O/P EST SF 10 MIN: CPT

## 2019-07-24 PROCEDURE — 99212 OFFICE O/P EST SF 10 MIN: CPT | Performed by: SURGERY

## 2019-07-31 ENCOUNTER — ANTI-COAG VISIT (OUTPATIENT)
Dept: PHARMACY | Age: 69
End: 2019-07-31
Payer: COMMERCIAL

## 2019-07-31 DIAGNOSIS — I48.91 ATRIAL FIBRILLATION WITH RAPID VENTRICULAR RESPONSE (HCC): ICD-10-CM

## 2019-07-31 LAB — INTERNATIONAL NORMALIZATION RATIO, POC: 3.6

## 2019-07-31 PROCEDURE — 99211 OFF/OP EST MAY X REQ PHY/QHP: CPT

## 2019-07-31 PROCEDURE — 85610 PROTHROMBIN TIME: CPT

## 2019-07-31 NOTE — PROGRESS NOTES
Date Value   07/31/2019 3.6   07/17/2019 2.7   07/11/2019 2.34 (H)   07/10/2019 2.53 (H)   07/09/2019 2.92 (H)   07/07/2019 3.18 (H)

## 2019-08-28 ENCOUNTER — TELEPHONE (OUTPATIENT)
Dept: PHARMACY | Age: 69
End: 2019-08-28

## 2019-08-29 NOTE — TELEPHONE ENCOUNTER
Christine Tello is a 81year old male who presents for a pre-operative physical exam.     Patient is to have cystoscopy with bluelight and ureteroscopy, to be done by Dr. Pepe Escalante and Kee Bravo at [facility] on 2/4/19.       HPI:   Reason for surgery: pe Correction, Monday, 9/9. Cap Take 1 capsule by mouth daily. Disp:  Rfl:    Elastic Bandages & Supports (MEDICAL COMPRESSION STOCKINGS) Does not apply Misc 1 Units by Does not apply route daily.  Disp: 2 Each Rfl: 1      Allergies: No Known Allergies   Past Medical History:   Diag denies new or unusual back pr joint pain or swelling  NEURO: denies new or unusual headaches  PSYCHE: denies new or unusual depression, + mild anxiety  HEMATOLOGIC: denies hx of unusual bruising or bleeding      EXAM:   /70   Pulse 102   Temp 98 °F (

## 2019-09-09 ENCOUNTER — ANTI-COAG VISIT (OUTPATIENT)
Dept: PHARMACY | Age: 69
End: 2019-09-09
Payer: COMMERCIAL

## 2019-09-09 DIAGNOSIS — I48.91 ATRIAL FIBRILLATION WITH RAPID VENTRICULAR RESPONSE (HCC): ICD-10-CM

## 2019-09-09 LAB — INTERNATIONAL NORMALIZATION RATIO, POC: 2.9

## 2019-09-09 PROCEDURE — 99211 OFF/OP EST MAY X REQ PHY/QHP: CPT

## 2019-09-09 PROCEDURE — 85610 PROTHROMBIN TIME: CPT

## 2019-09-09 NOTE — TELEPHONE ENCOUNTER
Warfarin prescription phoned into [facility] [address] to [address] under [address] Dylon Storey  Warfarin 5 mg tabs  Take 2.5mg on Sun and 5mg all other days  90 days   2 refills  Ivy Nicole, SiddharthaD, [facility]

## 2019-09-25 ENCOUNTER — TELEPHONE (OUTPATIENT)
Dept: CARDIOLOGY CLINIC | Age: 69
End: 2019-09-25

## 2019-09-25 ENCOUNTER — HOSPITAL ENCOUNTER (OUTPATIENT)
Dept: NON INVASIVE DIAGNOSTICS | Age: 69
Discharge: HOME | End: 2019-09-25
Payer: COMMERCIAL

## 2019-09-25 DIAGNOSIS — R06.02 SOB (SHORTNESS OF BREATH): ICD-10-CM

## 2019-09-25 DIAGNOSIS — I48.20 CHRONIC ATRIAL FIBRILLATION (HCC): ICD-10-CM

## 2019-09-25 LAB
LV EF: 60 %
LVEF MODALITY: NORMAL

## 2019-09-25 PROCEDURE — 93306 TTE W/DOPPLER COMPLETE: CPT

## 2019-09-25 NOTE — TELEPHONE ENCOUNTER
----- Message from SHAMAR Polanco sent at 9/25/2019  9:19 AM EDT -----  His pressures on his echo are elevated  He needs to schedule an appt with HARRY dominique

## 2019-10-14 ENCOUNTER — ANTI-COAG VISIT (OUTPATIENT)
Dept: PHARMACY | Age: 69
End: 2019-10-14
Payer: COMMERCIAL

## 2019-10-14 DIAGNOSIS — I48.91 ATRIAL FIBRILLATION WITH RAPID VENTRICULAR RESPONSE (HCC): ICD-10-CM

## 2019-10-14 LAB — INTERNATIONAL NORMALIZATION RATIO, POC: 3.7

## 2019-10-14 PROCEDURE — 99211 OFF/OP EST MAY X REQ PHY/QHP: CPT

## 2019-10-14 PROCEDURE — 85610 PROTHROMBIN TIME: CPT

## 2019-10-16 ENCOUNTER — OFFICE VISIT (OUTPATIENT)
Dept: CARDIOLOGY CLINIC | Age: 69
End: 2019-10-16
Payer: COMMERCIAL

## 2019-10-16 VITALS
DIASTOLIC BLOOD PRESSURE: 62 MMHG | SYSTOLIC BLOOD PRESSURE: 96 MMHG | BODY MASS INDEX: 40.94 KG/M2 | HEART RATE: 80 BPM | OXYGEN SATURATION: 98 % | WEIGHT: 286 LBS | HEIGHT: 70 IN

## 2019-10-16 DIAGNOSIS — R06.02 SOB (SHORTNESS OF BREATH): ICD-10-CM

## 2019-10-16 DIAGNOSIS — I25.10 ATHEROSCLEROSIS OF NATIVE CORONARY ARTERY OF NATIVE HEART WITHOUT ANGINA PECTORIS: ICD-10-CM

## 2019-10-16 DIAGNOSIS — I27.20 PULMONARY HYPERTENSION (HCC): ICD-10-CM

## 2019-10-16 DIAGNOSIS — I48.20 CHRONIC ATRIAL FIBRILLATION (HCC): ICD-10-CM

## 2019-10-16 DIAGNOSIS — G47.33 OSA (OBSTRUCTIVE SLEEP APNEA): ICD-10-CM

## 2019-10-16 DIAGNOSIS — E66.01 MORBID OBESITY (HCC): ICD-10-CM

## 2019-10-16 DIAGNOSIS — I50.32 CHRONIC DIASTOLIC HEART FAILURE (HCC): Primary | ICD-10-CM

## 2019-10-16 PROCEDURE — 99214 OFFICE O/P EST MOD 30 MIN: CPT | Performed by: INTERNAL MEDICINE

## 2019-10-16 RX ORDER — SPIRONOLACTONE 25 MG/1
12.5 TABLET ORAL DAILY
Qty: 30 TABLET | Refills: 5 | Status: SHIPPED | OUTPATIENT
Start: 2019-10-16 | End: 2019-10-16 | Stop reason: SDUPTHER

## 2019-10-16 RX ORDER — LISINOPRIL AND HYDROCHLOROTHIAZIDE 20; 12.5 MG/1; MG/1
1 TABLET ORAL DAILY
Refills: 3 | COMMUNITY
Start: 2019-07-28 | End: 2020-08-28 | Stop reason: SDUPTHER

## 2019-10-17 RX ORDER — SPIRONOLACTONE 25 MG/1
TABLET ORAL
Qty: 45 TABLET | Refills: 3 | Status: SHIPPED | OUTPATIENT
Start: 2019-10-17 | End: 2019-11-20

## 2019-10-30 ENCOUNTER — HOSPITAL ENCOUNTER (OUTPATIENT)
Age: 69
Discharge: HOME | End: 2019-10-30
Payer: COMMERCIAL

## 2019-10-30 ENCOUNTER — ANTI-COAG VISIT (OUTPATIENT)
Dept: PHARMACY | Age: 69
End: 2019-10-30
Payer: COMMERCIAL

## 2019-10-30 DIAGNOSIS — I50.32 CHRONIC DIASTOLIC HEART FAILURE (HCC): ICD-10-CM

## 2019-10-30 DIAGNOSIS — I48.91 ATRIAL FIBRILLATION WITH RAPID VENTRICULAR RESPONSE (HCC): ICD-10-CM

## 2019-10-30 DIAGNOSIS — R06.02 SOB (SHORTNESS OF BREATH): ICD-10-CM

## 2019-10-30 DIAGNOSIS — I27.20 PULMONARY HYPERTENSION (HCC): ICD-10-CM

## 2019-10-30 LAB
ANION GAP SERPL CALCULATED.3IONS-SCNC: 12 MMOL/L (ref 3–16)
BUN BLDV-MCNC: 18 MG/DL (ref 7–20)
CALCIUM SERPL-MCNC: 9.1 MG/DL (ref 8.3–10.6)
CHLORIDE BLD-SCNC: 103 MMOL/L (ref 99–110)
CO2: 25 MMOL/L (ref 21–32)
CREAT SERPL-MCNC: 1 MG/DL (ref 0.8–1.3)
GFR AFRICAN AMERICAN: >60
GFR NON-AFRICAN AMERICAN: >60
GLUCOSE BLD-MCNC: 107 MG/DL (ref 70–99)
HCT VFR BLD CALC: 36.4 % (ref 40.5–52.5)
HEMOGLOBIN: 11.8 G/DL (ref 13.5–17.5)
INTERNATIONAL NORMALIZATION RATIO, POC: 2
IRON SATURATION: 14 % (ref 20–50)
IRON: 52 UG/DL (ref 59–158)
MCH RBC QN AUTO: 28.1 PG (ref 26–34)
MCHC RBC AUTO-ENTMCNC: 32.4 G/DL (ref 31–36)
MCV RBC AUTO: 87 FL (ref 80–100)
PDW BLD-RTO: 13.8 % (ref 12.4–15.4)
PLATELET # BLD: 218 K/UL (ref 135–450)
PMV BLD AUTO: 8.8 FL (ref 5–10.5)
POTASSIUM SERPL-SCNC: 4.6 MMOL/L (ref 3.5–5.1)
PRO-BNP: 1509 PG/ML (ref 0–124)
RBC # BLD: 4.19 M/UL (ref 4.2–5.9)
SODIUM BLD-SCNC: 140 MMOL/L (ref 136–145)
TOTAL IRON BINDING CAPACITY: 374 UG/DL (ref 260–445)
WBC # BLD: 4.8 K/UL (ref 4–11)

## 2019-10-30 PROCEDURE — 99211 OFF/OP EST MAY X REQ PHY/QHP: CPT

## 2019-10-30 PROCEDURE — 85027 COMPLETE CBC AUTOMATED: CPT

## 2019-10-30 PROCEDURE — 36415 COLL VENOUS BLD VENIPUNCTURE: CPT

## 2019-10-30 PROCEDURE — 83540 ASSAY OF IRON: CPT

## 2019-10-30 PROCEDURE — 83880 ASSAY OF NATRIURETIC PEPTIDE: CPT

## 2019-10-30 PROCEDURE — 83550 IRON BINDING TEST: CPT

## 2019-10-30 PROCEDURE — 80048 BASIC METABOLIC PNL TOTAL CA: CPT

## 2019-10-30 PROCEDURE — 85610 PROTHROMBIN TIME: CPT

## 2019-11-05 ENCOUNTER — TELEPHONE (OUTPATIENT)
Dept: CARDIOLOGY CLINIC | Age: 69
End: 2019-11-05

## 2019-11-11 ENCOUNTER — OFFICE VISIT (OUTPATIENT)
Dept: ORTHOPEDIC SURGERY | Age: 69
End: 2019-11-11
Payer: COMMERCIAL

## 2019-11-11 VITALS
HEIGHT: 70 IN | HEART RATE: 68 BPM | BODY MASS INDEX: 40.8 KG/M2 | SYSTOLIC BLOOD PRESSURE: 135 MMHG | DIASTOLIC BLOOD PRESSURE: 84 MMHG | WEIGHT: 285 LBS

## 2019-11-11 DIAGNOSIS — M17.11 PRIMARY OSTEOARTHRITIS OF RIGHT KNEE: Primary | ICD-10-CM

## 2019-11-11 DIAGNOSIS — E66.01 CLASS 3 SEVERE OBESITY DUE TO EXCESS CALORIES WITHOUT SERIOUS COMORBIDITY WITH BODY MASS INDEX (BMI) OF 40.0 TO 44.9 IN ADULT (HCC): ICD-10-CM

## 2019-11-11 DIAGNOSIS — M17.12 PRIMARY OSTEOARTHRITIS OF LEFT KNEE: ICD-10-CM

## 2019-11-11 DIAGNOSIS — M22.41 CHONDROMALACIA OF RIGHT PATELLOFEMORAL JOINT: ICD-10-CM

## 2019-11-11 PROCEDURE — 99213 OFFICE O/P EST LOW 20 MIN: CPT | Performed by: ORTHOPAEDIC SURGERY

## 2019-11-20 ENCOUNTER — OFFICE VISIT (OUTPATIENT)
Dept: CARDIOLOGY CLINIC | Age: 69
End: 2019-11-20
Payer: COMMERCIAL

## 2019-11-20 VITALS
OXYGEN SATURATION: 96 % | HEART RATE: 88 BPM | SYSTOLIC BLOOD PRESSURE: 110 MMHG | WEIGHT: 287 LBS | HEIGHT: 70 IN | DIASTOLIC BLOOD PRESSURE: 70 MMHG | BODY MASS INDEX: 41.09 KG/M2

## 2019-11-20 DIAGNOSIS — I50.32 CHRONIC DIASTOLIC HEART FAILURE (HCC): Primary | ICD-10-CM

## 2019-11-20 DIAGNOSIS — I27.20 PULMONARY HYPERTENSION (HCC): ICD-10-CM

## 2019-11-20 DIAGNOSIS — R06.02 SOB (SHORTNESS OF BREATH): ICD-10-CM

## 2019-11-20 PROCEDURE — 99214 OFFICE O/P EST MOD 30 MIN: CPT | Performed by: INTERNAL MEDICINE

## 2019-11-20 RX ORDER — SPIRONOLACTONE 25 MG/1
25 TABLET ORAL DAILY
Qty: 90 TABLET | Refills: 3 | Status: SHIPPED | OUTPATIENT
Start: 2019-11-20 | End: 2021-03-18

## 2019-11-20 RX ORDER — FUROSEMIDE 40 MG/1
40 TABLET ORAL PRN
Qty: 90 TABLET | Refills: 3
Start: 2019-11-20 | End: 2020-07-28

## 2019-11-22 ENCOUNTER — TELEPHONE (OUTPATIENT)
Dept: PULMONOLOGY | Age: 69
End: 2019-11-22

## 2019-12-05 ENCOUNTER — TELEPHONE (OUTPATIENT)
Dept: PHARMACY | Age: 69
End: 2019-12-05

## 2019-12-10 DIAGNOSIS — E55.9 VITAMIN D DEFICIENCY: Primary | ICD-10-CM

## 2019-12-16 ENCOUNTER — ANTI-COAG VISIT (OUTPATIENT)
Dept: PHARMACY | Age: 69
End: 2019-12-16
Payer: COMMERCIAL

## 2019-12-16 DIAGNOSIS — I48.91 ATRIAL FIBRILLATION WITH RAPID VENTRICULAR RESPONSE (HCC): ICD-10-CM

## 2019-12-16 LAB — INTERNATIONAL NORMALIZATION RATIO, POC: 3.6

## 2019-12-16 PROCEDURE — 99211 OFF/OP EST MAY X REQ PHY/QHP: CPT

## 2019-12-16 PROCEDURE — 85610 PROTHROMBIN TIME: CPT

## 2020-01-06 ENCOUNTER — ANTI-COAG VISIT (OUTPATIENT)
Dept: PHARMACY | Age: 70
End: 2020-01-06
Payer: COMMERCIAL

## 2020-01-06 LAB — INTERNATIONAL NORMALIZATION RATIO, POC: 3.3

## 2020-01-06 PROCEDURE — 99212 OFFICE O/P EST SF 10 MIN: CPT

## 2020-01-06 PROCEDURE — 85610 PROTHROMBIN TIME: CPT

## 2020-01-13 ENCOUNTER — OFFICE VISIT (OUTPATIENT)
Dept: ORTHOPEDIC SURGERY | Age: 70
End: 2020-01-13
Payer: COMMERCIAL

## 2020-01-13 VITALS
WEIGHT: 287 LBS | HEART RATE: 83 BPM | DIASTOLIC BLOOD PRESSURE: 67 MMHG | HEIGHT: 70 IN | BODY MASS INDEX: 41.09 KG/M2 | SYSTOLIC BLOOD PRESSURE: 115 MMHG

## 2020-01-13 PROCEDURE — 99214 OFFICE O/P EST MOD 30 MIN: CPT | Performed by: ORTHOPAEDIC SURGERY

## 2020-01-13 NOTE — PROGRESS NOTES
Date:  2020    Name:  Beena Peace  Address:  [place] [address]    :  1950      Age:   77 y.o.    SSN:  [id number]      Medical Record Number:  [id number]    Reason for Visit:    Chief Complaint    Elbow Pain (OP/NP right elbow pain for about 3 months, reports when putting pressure on elbow will get sharp pains, no injury)      DOS:2020     HPI: Em Feliz is a 77 y.o. male here today for evaluation of lateral sided elbow pain for the past 3 months. He recalls no inciting incident, fall, nor injury. The pain began insidiously, and is most noticeable if he leans on his elbow for prolonged periods of time, especially at night. He has some relief with tylenol. He denies any hand symptoms, so no numbness or weakness. He has no pain in the shoulder. He has not tried anything besides tylenol, as he states he cannot consume anti-inflammatories due to his atrial fibrillation and coumadin. No physical therapy to the elbow, no prior injections. Pain Assessment  Location of Pain: Elbow  Location Modifiers: Right  Severity of Pain: 8  Quality of Pain: Sharp  Duration of Pain: A few minutes  Frequency of Pain: Intermittent  Aggravating Factors: (any pressure on elbow)  Limiting Behavior: Yes  Relieving Factors: Other (Comment)(tylenol)  Result of Injury: No  Work-Related Injury: No  Are there other pain locations you wish to document?: No  ROS: Pertinent items are noted in HPI.         Past Medical History:   Diagnosis Date    [facility] ([facility]     CAD (coronary artery disease)     Cancer ([facility]     skin    Diabetes mellitus ([facility]     Hypercholesteremia     Hypertension     Obesity     Sleep apnea     Ulcer of pyloric antrum 2014    anemia/GI bleed while on NSAIDS        Past Surgical History:   Procedure Laterality Date    COLONOSCOPY      CYST REMOVAL      DIAGNOSTIC CARDIAC CATH LAB PROCEDURE  06    KNEE SURGERY      arthroscopy bilateral    UPPER GASTROINTESTINAL ENDOSCOPY  02/07/14    EGD/Colonoscopy with biopsy of ulcer    UPPER GASTROINTESTINAL ENDOSCOPY  03/19/2018    with bx       Family History   Problem Relation Age of Onset    Heart Disease Mother     Cancer Father     Cancer Sister         breast       Social History     Socioeconomic History    Marital status:      Spouse name: None    Number of children: None    Years of education: None    Highest education level: None   Occupational History    None   Social Needs    Financial resource strain: None    Food insecurity:     Worry: None     Inability: None    Transportation needs:     Medical: None     Non-medical: None   Tobacco Use    Smoking status: Never Smoker    Smokeless tobacco: Never Used   Substance and Sexual Activity    Alcohol use:  Yes     Alcohol/week: 1.0 standard drinks     Types: 1 Cans of beer per week     Comment: occas    Drug use: No    Sexual activity: Yes     Partners: Female   Lifestyle    Physical activity:     Days per week: None     Minutes per session: None    Stress: None   Relationships    Social connections:     Talks on phone: None     Gets together: None     Attends Buddhism service: None     Active member of club or organization: None     Attends meetings of clubs or organizations: None     Relationship status: None    Intimate partner violence:     Fear of current or ex partner: None     Emotionally abused: None     Physically abused: None     Forced sexual activity: None   Other Topics Concern    None   Social History Narrative    None       Current Outpatient Medications   Medication Sig Dispense Refill    vitamin D (ERGOCALCIFEROL) 1.25 MG (82942 UT) CAPS capsule TAKE 1 CAPSULE BY MOUTH 1 TIME A WEEK 4 capsule 0    spironolactone (ALDACTONE) 25 MG tablet Take 1 tablet by mouth daily 90 tablet 3    furosemide (LASIX) 40 MG tablet Take 1 tablet by mouth as needed (take for SOB or wieght gain.) 90 tablet 3    metoprolol tartrate (LOPRESSOR) 25 MG tablet TAKE 1 TABLET BY MOUTH TWICE DAILY 60 tablet 2    lisinopril-hydrochlorothiazide (PRINZIDE;ZESTORETIC) 20-12.5 MG per tablet Take 1 tablet by mouth daily  3    warfarin (COUMADIN) 5 MG tablet One tab daily, as directed  On Saturday 7/13 and Sunday 7/14 take only 1/2 tabs. Get INR on Monday 7/15 (Patient taking differently: Managed by [facility]  One tab daily, as directed  On Saturday 7/13 and Sunday 7/14 take only 1/2 tabs. Get INR on Monday 7/15) 30 tablet 3    pantoprazole (PROTONIX) 40 MG tablet Take 1 tablet by mouth every morning (before breakfast) 90 tablet 0    CARTIA  MG extended release capsule TAKE 1 CAPSULE BY MOUTH DAILY 90 capsule 3    metFORMIN (GLUCOPHAGE) 1000 MG tablet Take 1,000 mg by mouth 2 times daily (with meals).  zolpidem (AMBIEN) 10 MG tablet nightly as needed . 2    atorvastatin (LIPITOR) 10 MG tablet Take 1 tablet by mouth daily. 30 tablet 0    aspirin EC 81 MG EC tablet Take 1 tablet by mouth daily. 30 tablet 3     No current facility-administered medications for this visit. Allergies   Allergen Reactions    No Known Allergies        Vital signs:  /67   Pulse 83   Ht 5' 10\" (1.778 m)   Wt 287 lb (130.2 kg)   BMI 41.18 kg/m²        Neuro: Alert & oriented x 3,  normal,  no focal deficits noted. Normal affect. Eyes: sclera clear  Ears: Normal external ear  Mouth:  No perioral lesions  Pulm: Respirations unlabored and regular  Pulse: Regular rate and rhythm   Skin: Warm, well perfused    Right Elbow Examination:    Inspection:  No skin lesions, no deformity, no swelling    Palpation:  Tenderness to palpation over the lateral epicondyle, but not at the medial epicondyle, or olecranon. No tenderness to palpation over the mobile wad, flexor pronator mass, biceps, triceps.   No tenderness over the ulnar collateral ligament and the lateral collateral ligament complex    Range of Motion: full extension and able to touch her shoulders in flexion, normal supination and pronation with the elbow at 90°    Strength:  Normal strength of the wrist extensors and flexors, normal strength of the biceps and triceps    Stability:  No instability noted to varus and valgus stress, no rotatory instability with pushup test    Neurovascular: Palpable radial pulse, normal sensation in the median, ulnar, radial nerve distributions    Special Tests:  Negative milking maneuver, negative valgus stress test and moving valgus stress test. Negative pivot-shift test.      Comparison Left Elbow Examination:    Inspection:  No skin lesions, no deformity, no swelling    Palpation:  No tenderness to palpation over the lateral epicondyle, medial epicondyle, olecranon. No tenderness to palpation over the mobile wad, flexor pronator mass, biceps, triceps. No tenderness over the ulnar collateral ligament and the lateral collateral ligament complex    Range of Motion: full extension and able to touch her shoulders in flexion, normal supination and pronation with the elbow at 90°    Strength:  Normal strength of the wrist extensors and flexors, normal strength of the biceps and triceps    Stability:  No instability noted to varus and valgus stress, no rotatory instability with pushup test    Neurovascular: Palpable radial pulse, normal sensation in the median, ulnar, radial nerve distributions    Special Tests:  Negative milking maneuver, negative valgus stress test and moving valgus stress test. Negative pivot-shift test.        Diagnostics:  Radiology:     2 X-ray of the Right  elbow including AP and Lateral were obtained and reviewed in office:    Impression: Mild ulno-humeral joint space narrowing, ossicle at the coronoid tip. Assessment: Right tennis elbow, lateral epicondylitis of 3 months duration. Plan:   Pertinent imaging was reviewed. The etiology, natural history, and treatment options for the disorder were discussed.   The roles of activity

## 2020-01-21 ENCOUNTER — TELEPHONE (OUTPATIENT)
Dept: PHARMACY | Age: 70
End: 2020-01-21

## 2020-01-24 NOTE — PROGRESS NOTES
[address]   Electrophysiology   Date: 1/28/2020    I had the privilege of visiting Brooks Paz in the office. HPI: Brooks Paz is a 77 y.o. history of chronic atrial fibrillation diagnosed in 2004 treated with sotalol, non-obstructive CAD, HTN, DM, HLD, Obesity and LUZ MARINA who was recently discharged from the hospital with acute GIB. Patient was on warfarin for stroke prophylaxis which was restarted after EGD (3/19/18) showed erosive gastritis and he was placed on PPI. The Watchman procedure was discussed on 1/7/19. DANI was ordered but it was not done patient patient is tolerating warfarin. He is being followed by Heart Failure for dHF and  PHTN. His Spironolactone was increased and he will take Lasix as needed. He was also referred for a sleep study. Interval history: He denies any issues on warfarin. He continues in atrial fib. He is feeling well. He has gained 10 pounds over the holidays.       Past Medical History:   Diagnosis Date    At[facility] fibrillation ([facility]     CAD (coronary artery disease)     Cancer (HCC)     skin    Diabetes mellitus ([facility] [facility]     Hypercholesteremia     Hypertension     Obesity     Sleep apnea     Ulcer of pyloric antrum 2/2014    anemia/GI bleed while on NSAIDS        Past Surgical History:   Procedure Laterality Date    COLONOSCOPY      CYST REMOVAL      DIAGNOSTIC CARDIAC CATH LAB PROCEDURE  8/24/06    KNEE SURGERY      arthroscopy bilateral    UPPER GASTROINTESTINAL ENDOSCOPY  02/07/14    EGD/Colonoscopy with biopsy of ulcer    UPPER GASTROINTESTINAL ENDOSCOPY  03/19/2018    with bx       Allergies   Allergen Reactions    No Known Allergies        Current Outpatient Medications   Medication Sig Dispense Refill    vitamin D (ERGOCALCIFEROL) 1.25 MG (80659 UT) CAPS capsule TAKE 1 CAPSULE BY MOUTH 1 TIME A WEEK 12 capsule 1    spironolactone (ALDACTONE) 25 MG tablet Take 1 tablet by mouth daily 90 tablet 3    furosemide (LASIX) 40 MG tablet osteoarthritis of right knee 2014    Chondromalacia of right patellofemoral joint 2014    Status post arthroscopic surgery of right knee chondroplasty, synovectomy, partial medial meniscectomy on 2006 (3A patellofemoral and medial compartment) 2014    Anemia 2014    GI bleed 2014      EK20  Atrial fib  QTcH 401    Echo:  19  Summary   Normal left ventricle size and systolic function with an estimated ejection   fraction of 60%. No regional wall motion abnormalities are seen. There is mild concentric left ventricular hypertrophy. E/e\"= 14. Indeterminate diastolic function. A-fib   The left atrium is mildly dilated. The right ventricle is mildly enlarged and decreased in function. Mild tricuspid regurgitation. PASP 70mmHg. PHTN   The right atrium is moderately dilated. IVC size is dilated (>2.1 cm) but collapses > 50% with respiration   consistent with elevated RA pressure (8 mmHg). ECHO: 14  Summary   -Normal left ventricle size and systolic function with an estimated ejection   fraction of 55%. No regional wall motion abnormalities are seen.   -There is mild concentric left ventricular hypertrophy.   -No A wave. E/e\"= 12.   -Trace of mitral regurgitation.   -Biatrial enlargement.   -Mild tricuspid regurgitation. RVSP 41mmHg. Cath:  06   Nonobstructive CAD  Normal LV function     Assessment and Plan:      - Chronic permanent atrial fibrillation:       Patient has chronic permanent atrial fibrillation, appears asymptomatic with regard to his atrial fibrillation. I have discussed his treatment options including ablation in the past however due to his longstanding atrial fibrillation and lack of symptoms he decided to continue with rate control anticoagulation. Patient has high [id number] score 4 (Age, HTN, DM, CAD) and requires anticoagulation to prevent thromboembolic events.    HASBLED Score: 4     Patient had history of GI bleeding in the past however this has been resolved and it appears that he is tolerating the Coumadin well. Discussed the watchman device with him again. It is reasonable to continue with anticoagulation if he can tolerate it. If in future he cannot tolerate anticoagulation, we will schedule for DANI and possible watchman device. - Nonobstructive coronary artery disease   Stable. Aspirin and Statin      Cath: 2006: Ostial Lcx 30%. Chronic diastolic heart failure and SOB   Multifactorial.    Morbid obesity contributing to HFpEF. Aggressive risk factor modifications. Chronic permanent atrial fibrillation for years    Ablation success won't be high. He is also not very symptomatic with his AF. He  also has multiple comorbidities including morbid obesity. Continue with rate control. LUZ MARINA:    Sleep study follow-up for possible CPAP    Morbid Obesity: Body mass index is 43.62 kg/m². Weight reduction, increasing physical activity, exercise and diet was recommended. - The patient is counseled to follow a low salt diet to assure blood pressure remains controlled for cardiovascular risk factor modification.   - The patient is counseled to avoid excess caffeine, and energy drinks as this may exacerbated ectopy and arrhythmia. - The patient is counseled to get regular exercise 3-5 times per week to control cardiovascular risk factors. - The patient is counseled to lose weigt to control cardiovascular risk factors. - The patient is counseled to avoid tobacco use. Andrea Hooks MD, MPH  [facility] [address]   Office: [phone]      Physician Attestation: I, Dr. Andrea Hooks, confirm that the scribe's documentation has been prepared under my direction and personally reviewed by me in its entirety. I also confirm that the note above accurately reflects all work, treatment, procedures, and medical decision making performed by me.

## 2020-01-28 ENCOUNTER — OFFICE VISIT (OUTPATIENT)
Dept: CARDIOLOGY CLINIC | Age: 70
End: 2020-01-28
Payer: COMMERCIAL

## 2020-01-28 ENCOUNTER — OFFICE VISIT (OUTPATIENT)
Dept: PULMONOLOGY | Age: 70
End: 2020-01-28
Payer: COMMERCIAL

## 2020-01-28 ENCOUNTER — ANTI-COAG VISIT (OUTPATIENT)
Dept: PHARMACY | Age: 70
End: 2020-01-28
Payer: COMMERCIAL

## 2020-01-28 VITALS
HEIGHT: 70 IN | SYSTOLIC BLOOD PRESSURE: 122 MMHG | OXYGEN SATURATION: 98 % | DIASTOLIC BLOOD PRESSURE: 85 MMHG | WEIGHT: 301 LBS | HEART RATE: 72 BPM | BODY MASS INDEX: 43.09 KG/M2

## 2020-01-28 VITALS
SYSTOLIC BLOOD PRESSURE: 111 MMHG | RESPIRATION RATE: 20 BRPM | WEIGHT: 304 LBS | HEIGHT: 70 IN | DIASTOLIC BLOOD PRESSURE: 70 MMHG | BODY MASS INDEX: 43.52 KG/M2 | HEART RATE: 77 BPM

## 2020-01-28 LAB — INTERNATIONAL NORMALIZATION RATIO, POC: 4.2

## 2020-01-28 PROCEDURE — 99214 OFFICE O/P EST MOD 30 MIN: CPT | Performed by: INTERNAL MEDICINE

## 2020-01-28 PROCEDURE — 99211 OFF/OP EST MAY X REQ PHY/QHP: CPT

## 2020-01-28 PROCEDURE — 85610 PROTHROMBIN TIME: CPT

## 2020-01-28 PROCEDURE — 93000 ELECTROCARDIOGRAM COMPLETE: CPT | Performed by: INTERNAL MEDICINE

## 2020-01-28 PROCEDURE — 99204 OFFICE O/P NEW MOD 45 MIN: CPT | Performed by: INTERNAL MEDICINE

## 2020-01-28 ASSESSMENT — ENCOUNTER SYMPTOMS
ABDOMINAL PAIN: 0
ABDOMINAL DISTENTION: 0
EYE PAIN: 0
CHOKING: 0
SHORTNESS OF BREATH: 0
APNEA: 0
VOMITING: 0
CHEST TIGHTNESS: 0
RHINORRHEA: 0
ALLERGIC/IMMUNOLOGIC NEGATIVE: 1
PHOTOPHOBIA: 0
NAUSEA: 0

## 2020-01-28 ASSESSMENT — SLEEP AND FATIGUE QUESTIONNAIRES
HOW LIKELY ARE YOU TO NOD OFF OR FALL ASLEEP WHILE LYING DOWN TO REST IN THE AFTERNOON WHEN CIRCUMSTANCES PERMIT: 0
HOW LIKELY ARE YOU TO NOD OFF OR FALL ASLEEP WHILE SITTING INACTIVE IN A PUBLIC PLACE: 1
HOW LIKELY ARE YOU TO NOD OFF OR FALL ASLEEP WHILE WATCHING TV: 1
HOW LIKELY ARE YOU TO NOD OFF OR FALL ASLEEP WHILE SITTING QUIETLY AFTER LUNCH WITHOUT ALCOHOL: 0
ESS TOTAL SCORE: 3
HOW LIKELY ARE YOU TO NOD OFF OR FALL ASLEEP WHILE SITTING AND READING: 1
HOW LIKELY ARE YOU TO NOD OFF OR FALL ASLEEP IN A CAR, WHILE STOPPED FOR A FEW MINUTES IN TRAFFIC: 0
NECK CIRCUMFERENCE (INCHES): 17
HOW LIKELY ARE YOU TO NOD OFF OR FALL ASLEEP WHEN YOU ARE A PASSENGER IN A CAR FOR AN HOUR WITHOUT A BREAK: 0
HOW LIKELY ARE YOU TO NOD OFF OR FALL ASLEEP WHILE SITTING AND TALKING TO SOMEONE: 0
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
## 2020-01-28 NOTE — PROGRESS NOTES
Mr. Nadja Mckeon is a 77 y.o. y/o male with history of Afib   He presents today for anticoagulation monitoring and adjustment. Pertinent PMH: HTN, CAD     Patient Reported Findings:  Yes     No  [x]   []       Patient verifies current dosing regimen as listed -  [x]   []       S/S bleeding/bruising/swelling/SOB states that his ankles hurt/ have been sore. They are turning purple and have been swollen. Is going to call md. States that he has gained weight but attributes it to holidays not swelling. Advised to attempt to use lasix since instructed for PRN and has not used in long time    []   [x]       Blood in urine or stool  []   [x]       Procedures scheduled in the future at this time   []   [x]       Missed dose   [x]   []       Extra dose  States that he took 5 mg on Sun and Thurs rather than 2.5 mg   []   [x]       Change in medications   [x]   []       Change in health/diet/appetite Has increased his salad diet in an effort to lose weight and reduce the carbs --> states that states that he has lost 35 lbs --> 38 lbs, consistent with greens he states  --> states that for the holidays he has been eating different but plans to return to normal --> is working to return to normal diet   []   [x]       Change in alcohol use   []   [x]       Change in activity   []   [x]       Hospital admission  []   [x]       Emergency department visit  []   [x]       Other complaints      Clinical Outcomes:   Yes     No  []   [x]       Major bleeding event  []   [x]       Thromboembolic event    Takes warfarin in AM  Duration of warfarin Therapy: indefinitely  INR Range:  2.0-3.0    INR 4.2 today d/t error in dosing   Hold dose today then return to decreased weekly dose to 2.5 mg on Sun and Thurs and 5 mg all other days of the week   Encouraged to maintain a consistency of vegetables/salads. Recheck INR in 2 weeks, 2/12    Referring cardiologist is Dr. Darryl Nunez.    INR (no units)   Date Value   01/28/2020 4.2   01/06/2020 3.3

## 2020-02-11 ENCOUNTER — HOSPITAL ENCOUNTER (OUTPATIENT)
Dept: SLEEP CENTER | Age: 70
Discharge: HOME | End: 2020-02-11
Payer: COMMERCIAL

## 2020-02-11 PROCEDURE — 95810 POLYSOM 6/> YRS 4/> PARAM: CPT

## 2020-02-11 PROCEDURE — 95810 POLYSOM 6/> YRS 4/> PARAM: CPT | Performed by: INTERNAL MEDICINE

## 2020-02-12 ENCOUNTER — ANTI-COAG VISIT (OUTPATIENT)
Dept: PHARMACY | Age: 70
End: 2020-02-12
Payer: COMMERCIAL

## 2020-02-12 ENCOUNTER — TELEPHONE (OUTPATIENT)
Dept: PULMONOLOGY | Age: 70
End: 2020-02-12

## 2020-02-12 LAB — INTERNATIONAL NORMALIZATION RATIO, POC: 2.2

## 2020-02-12 PROCEDURE — 85610 PROTHROMBIN TIME: CPT

## 2020-02-12 PROCEDURE — 99211 OFF/OP EST MAY X REQ PHY/QHP: CPT

## 2020-02-17 ENCOUNTER — TELEPHONE (OUTPATIENT)
Dept: PULMONOLOGY | Age: 70
End: 2020-02-17

## 2020-02-17 ENCOUNTER — HOSPITAL ENCOUNTER (OUTPATIENT)
Dept: VASCULAR LAB | Age: 70
Discharge: HOME | End: 2020-02-17
Payer: COMMERCIAL

## 2020-02-17 PROCEDURE — 93971 EXTREMITY STUDY: CPT

## 2020-02-18 ENCOUNTER — HOSPITAL ENCOUNTER (OUTPATIENT)
Dept: SLEEP CENTER | Age: 70
Discharge: HOME | End: 2020-02-18
Payer: COMMERCIAL

## 2020-02-18 PROCEDURE — 95811 POLYSOM 6/>YRS CPAP 4/> PARM: CPT | Performed by: INTERNAL MEDICINE

## 2020-02-18 PROCEDURE — 95811 POLYSOM 6/>YRS CPAP 4/> PARM: CPT

## 2020-02-21 ENCOUNTER — TELEPHONE (OUTPATIENT)
Dept: PULMONOLOGY | Age: 70
End: 2020-02-21

## 2020-02-28 ENCOUNTER — TELEPHONE (OUTPATIENT)
Dept: PULMONOLOGY | Age: 70
End: 2020-02-28

## 2020-03-02 NOTE — PROGRESS NOTES
daily  3    warfarin (COUMADIN) 5 MG tablet One tab daily, as directed  On Saturday 7/13 and Sunday 7/14 take only 1/2 tabs. Get INR on Monday 7/15 (Patient taking differently: Managed by [facility]  One tab daily, as directed  On Saturday 7/13 and Sunday 7/14 take only 1/2 tabs. Get INR on Monday 7/15) 30 tablet 3    pantoprazole (PROTONIX) 40 MG tablet Take 1 tablet by mouth every morning (before breakfast) 90 tablet 0    CARTIA  MG extended release capsule TAKE 1 CAPSULE BY MOUTH DAILY 90 capsule 3    metFORMIN (GLUCOPHAGE) 1000 MG tablet Take 1,000 mg by mouth 2 times daily (with meals).  zolpidem (AMBIEN) 10 MG tablet nightly as needed . 2    atorvastatin (LIPITOR) 10 MG tablet Take 1 tablet by mouth daily. 30 tablet 0    aspirin EC 81 MG EC tablet Take 1 tablet by mouth daily. 30 tablet 3    furosemide (LASIX) 40 MG tablet Take 1 tablet by mouth as needed (take for SOB or wieght gain.) (Patient not taking: Reported on 3/4/2020) 90 tablet 3     No current facility-administered medications for this visit.         Past Medical History:   Diagnosis Date    Atrial fibrillation ([address]     CAD (coronary artery disease)     Cancer ([address]     skin    Diabetes mellitus ([address]     Hypercholesteremia     Hypertension     Obesity     Sleep apnea     Ulcer of pyloric antrum 2/2014    anemia/GI bleed while on NSAIDS     Past Surgical History:   Procedure Laterality Date    COLONOSCOPY      CYST REMOVAL      DIAGNOSTIC CARDIAC CATH LAB PROCEDURE  8/24/06    KNEE SURGERY      arthroscopy bilateral    UPPER GASTROINTESTINAL ENDOSCOPY  02/07/14    EGD/Colonoscopy with biopsy of ulcer    UPPER GASTROINTESTINAL ENDOSCOPY  03/19/2018    with bx     Family History   Problem Relation Age of Onset    Heart Disease Mother     Cancer Father     Cancer Sister         breast     Social History     Socioeconomic History    Marital status:      Spouse name: Not on file    Number of children: Not on file    Years of education: Not on file    Highest education level: Not on file   Occupational History    Not on file   Social Needs    Financial resource strain: Not on file    Food insecurity:     Worry: Not on file     Inability: Not on file    Transportation needs:     Medical: Not on file     Non-medical: Not on file   Tobacco Use    Smoking status: Never Smoker    Smokeless tobacco: Never Used   Substance and Sexual Activity    Alcohol use: Yes     Alcohol/week: 1.0 standard drinks     Types: 1 Cans of beer per week     Comment: occas    Drug use: No    Sexual activity: Yes     Partners: Female   Lifestyle    Physical activity:     Days per week: Not on file     Minutes per session: Not on file    Stress: Not on file   Relationships    Social connections:     Talks on phone: Not on file     Gets together: Not on file     Attends [employer] service: Not on file     Active member of club or organization: Not on file     Attends meetings of clubs or organizations: Not on file     Relationship status: Not on file    Intimate partner violence:     Fear of current or ex partner: Not on file     Emotionally abused: Not on file     Physically abused: Not on file     Forced sexual activity: Not on file   Other Topics Concern    Not on file   Social History Narrative    Not on file       Review of Systems:   · Constitutional: there has been no unanticipated weight loss. There's been no change in energy level, sleep pattern, or activity level. · Eyes: No visual changes or diplopia. No scleral icterus. · ENT: No Headaches, hearing loss or vertigo. No mouth sores or sore throat. · Cardiovascular: Reviewed in HPI  · Respiratory: No cough or wheezing, no sputum production. No hematemesis. · Gastrointestinal: No abdominal pain, appetite loss, blood in stools. No change in bowel or bladder habits. · Genitourinary: No dysuria, trouble voiding, or hematuria.   · Musculoskeletal:  No gait abnormalities of mood, affect, memory, mentation, or behavior are noted    Diagnostic Testing:  Echo 9-25-19   Normal left ventricle size and systolic function with an estimated ejection   fraction of 60%. No regional wall motion abnormalities are seen.  Maricarmen Drew is mild concentric left ventricular hypertrophy.   E/e\"= 14. Indeterminate diastolic function. A-fib   The left atrium is mildly dilated.   The right ventricle is mildly enlarged and decreased in function.   Mild tricuspid regurgitation. PASP 70mmHg. PHTN   The right atrium is moderately dilated.  IVC size is dilated (>2.1 cm) but collapses > 50% with respiration   consistent with elevated RA pressure (8 mmHg).    11-13-19    TC  124, HDL 57, LDL 54, TG 66.    Hgb 12.1, Hct 37.5  BUN 24, Creat 1.0, K+ 4.5 (on spironolactone 12.5mg)  A1C 6.2 down from 6.7 in April 2019  TSH 1.84      Echo 8-29-14  Normal left ventricle size and systolic function with an estimated ejection   fraction of 55%. No regional wall motion abnormalities are seen.   -There is mild concentric left ventricular hypertrophy.   -No A wave. E/e\"= 12.   -Trace of mitral regurgitation.   -Biatrial enlargement.   -Mild tricuspid regurgitation. RVSP 41mmHg.     [facility] [place] 8-2006  Dr. Roderick Odom   Normal coronaries. Non obstructive CAD. Normal LV function. Labs were reviewed including labs from other hospital systems through [facility]. Cardiac testing was reviewed including echos, nuclear scans, cardiac catheterization, including from other hospital systems through [facility]. Assessment:    1. Chronic diastolic heart failure ([address]    2. Pure hypercholesterolemia    3. Chronic atrial fibrillation    4. Morbid obesity with BMI of 40.0-44.9, adult ([address]    5. SOB (shortness of breath)    6. LUZ MARINA (obstructive sleep apnea)    7. Essential hypertension        1.  Chronic diastolic heart failure (HCC) Compensated by exam.  No SOB or edema at present.   -Increase spironolactone to 25mg and get repeat blood work. -Echo 9/2019 shows mild concentric left ventricular hypertrophy. EF 60%     2. Pure hypercholesterolemia:  11-13-19 , HDL 57, LDL 54, TG 66.   -Continue Lipitor 10mg daily. 3. Chronic atrial Fib:  On Coumadin. 4. Morbid obesity with BMI of 40.0-44.9, adult ([facility] [address] [facility]    5. SOB (shortness of breath):  Denies SOB. 6. LUZ MARINA (obstructive sleep apnea):  Positive for LUZ MARINA. Starting Bipap treatment soon. Pulmonary hypertension ([facility] [address]):  Echo 9/25/19 RVSP 70mmHg. Need to reduce volume.  -Continue Spironolactone and get labs. -Will repeat Echo in the future after Sleep Apnea is treated. Chronic atrial fibrillation: Rate controlled on Coumadin therapy. Managed by [facility] cou[facility]. Hx GI bleed. Now off all ibuprofen. H/H stable. Atherosclerosis of native coronary artery of native heart without angina pectoris:  Denies chest pain this visit.    -Hx of non-obstructive CAD. On ASA      Plan:  1. Labs today  2. Continue same meds  3. See Dr. Maggie Perkins in 3 months    Scribe's attestation: This note was scribed in the presence of Alejo Vincent M.D. by Jennifer Still RN     The scribe's documentation has been prepared under my direction and personally reviewed by me in its entirety. I confirm that the note above accurately reflects all work, treatment, procedures, and medical decision making performed by me. QUALITY MEASURES  1. Tobacco Cessation Counseling: NA  2. Retake of BP if >140/90:   NA  3. Documentation to PCP/referring for new patient:  Sent to PCP at close of office visit  4. CAD patient on anti-platelet: NA on asa  5. CAD patient on STATIN therapy:  NA  6. Patient with CHF and aFib on anticoagulation:  Yes       I appreciate the opportunity of cooperating in the care of this patient.     Alejo Vincent M.D., [facility]

## 2020-03-02 NOTE — TELEPHONE ENCOUNTER
Prescription refill    Last OV:  01/28/2020    Last Refill:  11/18/2019    Labs:  10/30/2019    Future Appt: 03/04/2020

## 2020-03-04 ENCOUNTER — ANTI-COAG VISIT (OUTPATIENT)
Dept: PHARMACY | Age: 70
End: 2020-03-04
Payer: COMMERCIAL

## 2020-03-04 ENCOUNTER — OFFICE VISIT (OUTPATIENT)
Dept: CARDIOLOGY CLINIC | Age: 70
End: 2020-03-04
Payer: COMMERCIAL

## 2020-03-04 ENCOUNTER — HOSPITAL ENCOUNTER (OUTPATIENT)
Age: 70
Discharge: HOME | End: 2020-03-04
Payer: COMMERCIAL

## 2020-03-04 VITALS
HEART RATE: 71 BPM | HEIGHT: 70 IN | BODY MASS INDEX: 42.66 KG/M2 | WEIGHT: 298 LBS | DIASTOLIC BLOOD PRESSURE: 76 MMHG | OXYGEN SATURATION: 95 % | SYSTOLIC BLOOD PRESSURE: 136 MMHG

## 2020-03-04 LAB
A/G RATIO: 1.4 (ref 1.1–2.2)
ALBUMIN SERPL-MCNC: 4 G/DL (ref 3.4–5)
ALP BLD-CCNC: 77 U/L (ref 40–129)
ALT SERPL-CCNC: 12 U/L (ref 10–40)
ANION GAP SERPL CALCULATED.3IONS-SCNC: 11 MMOL/L (ref 3–16)
AST SERPL-CCNC: 18 U/L (ref 15–37)
BILIRUB SERPL-MCNC: 0.6 MG/DL (ref 0–1)
BUN BLDV-MCNC: 21 MG/DL (ref 7–20)
CALCIUM SERPL-MCNC: 9.2 MG/DL (ref 8.3–10.6)
CHLORIDE BLD-SCNC: 102 MMOL/L (ref 99–110)
CHOLESTEROL, TOTAL: 117 MG/DL (ref 0–199)
CO2: 27 MMOL/L (ref 21–32)
CREAT SERPL-MCNC: 1.2 MG/DL (ref 0.8–1.3)
GFR AFRICAN AMERICAN: >60
GFR NON-AFRICAN AMERICAN: >60
GLOBULIN: 2.9 G/DL
GLUCOSE BLD-MCNC: 118 MG/DL (ref 70–99)
HCT VFR BLD CALC: 36.1 % (ref 40.5–52.5)
HDLC SERPL-MCNC: 39 MG/DL (ref 40–60)
HEMOGLOBIN: 11.7 G/DL (ref 13.5–17.5)
INTERNATIONAL NORMALIZATION RATIO, POC: 1.6
LDL CHOLESTEROL CALCULATED: 63 MG/DL
MCH RBC QN AUTO: 27.6 PG (ref 26–34)
MCHC RBC AUTO-ENTMCNC: 32.4 G/DL (ref 31–36)
MCV RBC AUTO: 85.2 FL (ref 80–100)
PDW BLD-RTO: 14.7 % (ref 12.4–15.4)
PLATELET # BLD: 232 K/UL (ref 135–450)
PMV BLD AUTO: 8.5 FL (ref 5–10.5)
POTASSIUM SERPL-SCNC: 4.6 MMOL/L (ref 3.5–5.1)
PRO-BNP: 1846 PG/ML (ref 0–124)
RBC # BLD: 4.23 M/UL (ref 4.2–5.9)
SODIUM BLD-SCNC: 140 MMOL/L (ref 136–145)
TOTAL PROTEIN: 6.9 G/DL (ref 6.4–8.2)
TRIGL SERPL-MCNC: 74 MG/DL (ref 0–150)
VITAMIN D 25-HYDROXY: 46 NG/ML
VLDLC SERPL CALC-MCNC: 15 MG/DL
WBC # BLD: 5.9 K/UL (ref 4–11)

## 2020-03-04 PROCEDURE — 80061 LIPID PANEL: CPT

## 2020-03-04 PROCEDURE — 82306 VITAMIN D 25 HYDROXY: CPT

## 2020-03-04 PROCEDURE — 85027 COMPLETE CBC AUTOMATED: CPT

## 2020-03-04 PROCEDURE — 99214 OFFICE O/P EST MOD 30 MIN: CPT | Performed by: INTERNAL MEDICINE

## 2020-03-04 PROCEDURE — 99211 OFF/OP EST MAY X REQ PHY/QHP: CPT

## 2020-03-04 PROCEDURE — 80053 COMPREHEN METABOLIC PANEL: CPT

## 2020-03-04 PROCEDURE — 83880 ASSAY OF NATRIURETIC PEPTIDE: CPT

## 2020-03-04 PROCEDURE — 85610 PROTHROMBIN TIME: CPT

## 2020-03-04 PROCEDURE — 36415 COLL VENOUS BLD VENIPUNCTURE: CPT

## 2020-03-04 NOTE — PROGRESS NOTES
week.  Encouraged to maintain a consistency of vegetables/salads. Recheck INR in 2 weeks, 3/18    Referring cardiologist is Dr. Benny Simmons.    INR (no units)   Date Value   03/04/2020 1.6   02/12/2020 2.2   01/28/2020 4.2   01/06/2020 3.3   07/11/2019 2.34 (H)   07/10/2019 2.53 (H)   07/09/2019 2.92 (H)   07/07/2019 3.18 (H)

## 2020-03-16 ENCOUNTER — TELEPHONE (OUTPATIENT)
Dept: PHARMACY | Age: 70
End: 2020-03-16

## 2020-03-16 NOTE — TELEPHONE ENCOUNTER
Pt cancelled [facility] appt for tomorrow, placed pool message asking pharmacist to call to r/s ( d/t COVID-19)

## 2020-03-16 NOTE — TELEPHONE ENCOUNTER
----- Message from Caterina Mccray sent at 3/16/2020  2:55 PM EDT -----  Regarding: Cancelled appt d/t COVID -19  PT called to cancel [facility] appt tomorrow, would like to r/s d/t covid-19.  Please call Truman Meraz at   [phone]

## 2020-03-20 ENCOUNTER — TELEPHONE (OUTPATIENT)
Dept: PULMONOLOGY | Age: 70
End: 2020-03-20

## 2020-03-20 NOTE — TELEPHONE ENCOUNTER
Pt states he was busy when [facility] called. He asked them to call back and the;y have not.   Pt was given the # for [facility]

## 2020-03-24 RX ORDER — DILTIAZEM HYDROCHLORIDE 240 MG/1
CAPSULE, EXTENDED RELEASE ORAL
Qty: 90 CAPSULE | Refills: 3 | Status: SHIPPED | OUTPATIENT
Start: 2020-03-24 | End: 2021-03-16

## 2020-03-24 NOTE — TELEPHONE ENCOUNTER
Pended a refill request for your approval for Diltiazem 240 mg #90. Last OV with RMM 1/28/2020.   - Chronic permanent atrial fibrillation:                             Patient has chronic permanent atrial fibrillation, appears asymptomatic with regard to his atrial fibrillation.               I have discussed his treatment options including ablation in the past however due to his longstanding atrial fibrillation and lack of symptoms he decided to continue with rate control anticoagulation.

## 2020-03-25 ENCOUNTER — ANTI-COAG VISIT (OUTPATIENT)
Dept: PHARMACY | Age: 70
End: 2020-03-25
Payer: COMMERCIAL

## 2020-03-25 LAB — INTERNATIONAL NORMALIZATION RATIO, POC: 2.3

## 2020-03-25 PROCEDURE — 99211 OFF/OP EST MAY X REQ PHY/QHP: CPT

## 2020-03-25 PROCEDURE — 85610 PROTHROMBIN TIME: CPT

## 2020-03-25 NOTE — PROGRESS NOTES
Mr. Sakshi Perez is a 72 y.o. y/o male with history of Afib   He presents today for anticoagulation monitoring and adjustment. Pertinent PMH: HTN, CAD     Patient Reported Findings:  Yes     No  [x]   []       Patient verifies current dosing regimen as listed -patient has not been taking 2.5mg on Thursdays as instructed, instead has been taking 2.5mg on Sundays and 5mg on AODs  []   [x]       S/S bleeding/bruising/swelling/SOB     []   [x]       Blood in urine or stool  []   [x]       Procedures scheduled in the future at this time   []   [x]       Missed dose  - denies, but patient has history of confusion with dosing  []   [x]       Extra dose    []   [x]       Change in medications   [x]   []       Change in health/diet/appetite Has increased his salad diet in an effort to lose weight and reduce the carbs --> states that states that he has lost 35 lbs --> 38 lbs, consistent with greens he states  --> states that for the holidays he has been eating different but plans to return to normal --> is working to return to normal diet ---> states he is still working on getting back to his normal diet. \"Not quiet there yet though\". -> diet back to normal now, reports about 1 servings per day of green vegetables  []   [x]       Change in alcohol use   []   [x]       Change in activity   []   [x]       Hospital admission  []   [x]       Emergency department visit  []   [x]       Other complaints    - Pt had Venous Doppler 2/17 - neg for thrombus. Clinical Outcomes:   Yes     No  []   [x]       Major bleeding event  []   [x]       Thromboembolic event    Takes warfarin in AM  Duration of warfarin Therapy: indefinitely  INR Range:  2.0-3.0    INR 2.3 today   Patient was instructed at last appt (3/4/20) to take 2.5mg on Sun AND Thurs and 5mg on all other days.  Instead, pt has been taking 2.5mg on Sundays and 5mg on all other days  Patient has a history of getting confused with weekly dose  Continue weekly dose of 2.5 mg on Sun and 5 mg all other days of the week. (not a true dose increase since patient has been taking this for the past three weeks)  Encouraged to maintain a consistency of vegetables/salads. Recheck INR in 4 weeks, 4/22 - discussed COVID-19 precautions with patient. Informed that next appt will likely need to be over the phone and INR check will be required at an outpatient lab. [phone] to call patient with a list of available locations prior to scheduled appt. Referring cardiologist is Dr. Darryl Lyn.    INR (no units)   Date Value   03/25/2020 2.3   03/04/2020 1.6   02/12/2020 2.2   01/28/2020 4.2   07/11/2019 2.34 (H)   07/10/2019 2.53 (H)   07/09/2019 2.92 (H)   07/07/2019 3.18 (H)

## 2020-04-14 ENCOUNTER — TELEPHONE (OUTPATIENT)
Dept: PHARMACY | Age: 70
End: 2020-04-14

## 2020-04-22 NOTE — TELEPHONE ENCOUNTER
Per [facility], there are no INR results for this patient,  He did not present to their lab today for an INR draw.

## 2020-04-23 NOTE — TELEPHONE ENCOUNTER
Patient decided to go to his PCP's office next Wednesday, (4/29) to get his INR done. He will ask her to fax us the results.

## 2020-04-27 NOTE — TELEPHONE ENCOUNTER
N/s on 4/22, pt r/s on 4/29 MDs office will check INR and fax results to [facility].

## 2020-04-29 LAB — INR BLD: 3.5

## 2020-04-30 ENCOUNTER — ANTI-COAG VISIT (OUTPATIENT)
Dept: PHARMACY | Age: 70
End: 2020-04-30
Payer: COMMERCIAL

## 2020-04-30 PROCEDURE — 99211 OFF/OP EST MAY X REQ PHY/QHP: CPT

## 2020-04-30 NOTE — PROGRESS NOTES
today. Since pt already took a smaller dose for today- will have him just continue his normal dose. Continue weekly dose of 2.5 mg on Sunday and 5 mg all other days of the week. Encouraged to maintain a consistency of vegetables/salads. Recheck INR in 2 weeks, 5/13- will be going back to Dr. Arleen Thomason' office to get labs and another INR. Referring cardiologist is Dr. Kristel Hightower.    INR (no units)   Date Value   03/25/2020 2.3   03/04/2020 1.6   02/12/2020 2.2   01/28/2020 4.2   07/11/2019 2.34 (H)   07/10/2019 2.53 (H)   07/09/2019 2.92 (H)   07/07/2019 3.18 (H)     CLINICAL PHARMACY CONSULT: MED RECONCILIATION/REVIEW ADDENDUM    For Pharmacy Admin Tracking Only    PHSO: No  Total # of Interventions Recommended: 1  - Decreased Dose #: 1  - Maintenance Safety Lab Monitoring #: 1  Total Interventions Accepted: 1  Time Spent (min): Via Lukas Singh PharmD

## 2020-05-01 ENCOUNTER — TELEPHONE (OUTPATIENT)
Dept: CARDIOLOGY CLINIC | Age: 70
End: 2020-05-01

## 2020-05-01 NOTE — TELEPHONE ENCOUNTER
Pts last echo in 9/2019 showed EF of 60% and RVSP of 70mmHg. He states he never received his CPAP and he has left messages for Dr. Riky Marti and the [email or web address] company. He works part time at [employer] as a . He goes into different dental offices and is fearful of the virus. Explained to him how to take his lasix as needed and to start doing a daily weight everyday. He will call [employer] and get his Lasix and call the [employer] again. Explained to him the need to wear the CPAP/Bipap and how it effects his heart pressures.

## 2020-05-01 NOTE — TELEPHONE ENCOUNTER
If he is just being a  in and out of offices, he should just wear gloves and a mask. Use lots of hand . I sent a message to Dr. Adis Perez. Hopefully he can help get the BIPAP.     HARRY

## 2020-05-14 ENCOUNTER — ANTI-COAG VISIT (OUTPATIENT)
Dept: PHARMACY | Age: 70
End: 2020-05-14
Payer: COMMERCIAL

## 2020-05-14 ENCOUNTER — TELEPHONE (OUTPATIENT)
Dept: PHARMACY | Age: 70
End: 2020-05-14

## 2020-05-14 LAB — INR BLD: 2.3

## 2020-05-14 PROCEDURE — 99211 OFF/OP EST MAY X REQ PHY/QHP: CPT

## 2020-05-14 NOTE — PROGRESS NOTES
consistency of vegetables/salads. Recheck INR in 4 weeks, 6/11- will be going back to Dr. Amarjit Aguiar' office to get labs and another INR. Referring cardiologist is Dr. Arthur Meyer.    INR (no units)   Date Value   05/14/2020 2.30   04/29/2020 3.50   03/25/2020 2.3   03/04/2020 1.6   02/12/2020 2.2   01/28/2020 4.2   07/11/2019 2.34 (H)   07/10/2019 2.53 (H)     CLINICAL PHARMACY CONSULT: MED RECONCILIATION/REVIEW ADDENDUM    For Pharmacy Admin Tracking Only    PHSO: No  Total # of Interventions Recommended: 0  - Maintenance Safety Lab Monitoring #: 1  Total Interventions Accepted: 0  Time Spent (min): 15    Palak Duenas, SiddharthaD

## 2020-06-10 ENCOUNTER — PROCEDURE VISIT (OUTPATIENT)
Dept: NEUROLOGY | Age: 70
End: 2020-06-10
Payer: COMMERCIAL

## 2020-06-10 PROCEDURE — 95909 NRV CNDJ TST 5-6 STUDIES: CPT | Performed by: PSYCHIATRY & NEUROLOGY

## 2020-06-10 PROCEDURE — 95886 MUSC TEST DONE W/N TEST COMP: CPT | Performed by: PSYCHIATRY & NEUROLOGY

## 2020-06-12 ENCOUNTER — TELEPHONE (OUTPATIENT)
Dept: ORTHOPEDIC SURGERY | Age: 70
End: 2020-06-12

## 2020-06-12 ENCOUNTER — ANTI-COAG VISIT (OUTPATIENT)
Dept: PHARMACY | Age: 70
End: 2020-06-12
Payer: COMMERCIAL

## 2020-06-12 VITALS — TEMPERATURE: 97.9 F

## 2020-06-12 LAB — INTERNATIONAL NORMALIZATION RATIO, POC: 3.2

## 2020-06-12 PROCEDURE — 99211 OFF/OP EST MAY X REQ PHY/QHP: CPT

## 2020-06-12 PROCEDURE — 85610 PROTHROMBIN TIME: CPT

## 2020-06-12 NOTE — PROGRESS NOTES
7/10    Referring cardiologist is Dr. Nadiya Ryan.    INR (no units)   Date Value   05/14/2020 2.30   04/29/2020 3.50   03/25/2020 2.3   03/04/2020 1.6   02/12/2020 2.2   01/28/2020 4.2   07/11/2019 2.34 (H)   07/10/2019 2.53 (H)     CLINICAL PHARMACY CONSULT: MED RECONCILIATION/REVIEW ADDENDUM    For Pharmacy Admin Tracking Only    PHSO: No  Total # of Interventions Recommended: 1  - Decreased Dose #: 1  - Maintenance Safety Lab Monitoring #: 1  Total Interventions Accepted: 1  Time Spent (min): 15    Hugo Padilla, SiddharthaD

## 2020-06-22 ENCOUNTER — OFFICE VISIT (OUTPATIENT)
Dept: ORTHOPEDIC SURGERY | Age: 70
End: 2020-06-22
Payer: COMMERCIAL

## 2020-06-22 VITALS — WEIGHT: 295 LBS | BODY MASS INDEX: 43.69 KG/M2 | TEMPERATURE: 97.4 F | HEIGHT: 69 IN

## 2020-06-22 PROCEDURE — 99213 OFFICE O/P EST LOW 20 MIN: CPT | Performed by: ORTHOPAEDIC SURGERY

## 2020-06-22 PROCEDURE — L3908 WHO COCK-UP NONMOLDE PRE OTS: HCPCS | Performed by: ORTHOPAEDIC SURGERY

## 2020-06-22 PROCEDURE — 20605 DRAIN/INJ JOINT/BURSA W/O US: CPT | Performed by: ORTHOPAEDIC SURGERY

## 2020-06-22 RX ORDER — METHYLPREDNISOLONE ACETATE 40 MG/ML
40 INJECTION, SUSPENSION INTRA-ARTICULAR; INTRALESIONAL; INTRAMUSCULAR; SOFT TISSUE ONCE
Status: COMPLETED | OUTPATIENT
Start: 2020-06-22 | End: 2020-06-22

## 2020-06-22 RX ADMIN — METHYLPREDNISOLONE ACETATE 40 MG: 40 INJECTION, SUSPENSION INTRA-ARTICULAR; INTRALESIONAL; INTRAMUSCULAR; SOFT TISSUE at 10:32

## 2020-06-22 NOTE — PROGRESS NOTES
Procedures    MS ARTHROCENTESIS ASPIR&/INJ INTERM JT/BURS W/O US    Alicia Verdugo Titan Wrist Short Brace     Patient was prescribed a Alicia Verdugo Titan Wrist Orthosis. The right wrist will require stabilization / immobilization from this semi-rigid / rigid orthosis to improve their function. The orthosis will assist in protecting the affected area, provide functional support and facilitate healing. The patient was educated and fit by a healthcare professional with expert knowledge and specialization in brace application while under the direct supervision of the treating physician. Verbal and written instructions for the use of and application of this item were provided. They were instructed to contact the office immediately should the brace result in increased pain, decreased sensation, increased swelling or worsening of the condition.
nerve distributions    Special Tests:  Negative tests for lateral epicondylitis. Equivocal durkan's compression test. Equivocal tinel's at the ulnar nerve and carpal tunnel. Comparison LEFT Elbow Examination:    Inspection:  No skin lesions, no deformity, no swelling    Palpation:  No tenderness to palpation over the lateral epicondyle, medial epicondyle, olecranon. No tenderness to palpation over the mobile wad, flexor pronator mass, biceps, triceps. No tenderness over the ulnar collateral ligament and the lateral collateral ligament complex    Range of Motion: full extension and able to touch her shoulders in flexion, normal supination and pronation with the elbow at 90°    Strength:  Normal strength of the wrist extensors and flexors, normal strength of the biceps and triceps    Stability:  No instability noted to varus and valgus stress, no rotatory instability with pushup test    Neurovascular: Palpable radial pulse, normal sensation in the median, ulnar, radial nerve distributions      Diagnostics:  Radiology:     EMG 6/10/2020  Moderate severe median nerve compression at the wrist  Moderately severe ulnar nerve compression at the elbow  No new elbow images were taken. Assessment: Right wrist carpal tunnel syndrome, and mild to moderate cubital tunnel syndrome at the elbow. Plan: Pertinent imaging was reviewed. The etiology, natural history, and treatment options for the disorder were discussed. The roles of activity medication, antiinflammatories, injections, bracing, physical therapy, and surgical interventions were all described to the patient and questions were answered. We believe patient is a candidate for a cortisone injection into his right carpal tunnel at the wrist.    The indications and risks of steroid injection as well as treatment alternatives were discussed with the patient who consented to the procedure.  Under sterile conditions and after informed consent was obtained the
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
## 2020-06-29 ENCOUNTER — HOSPITAL ENCOUNTER (OUTPATIENT)
Age: 70
Discharge: HOME | End: 2020-06-29
Payer: COMMERCIAL

## 2020-06-29 ENCOUNTER — OFFICE VISIT (OUTPATIENT)
Dept: CARDIOLOGY CLINIC | Age: 70
End: 2020-06-29
Payer: COMMERCIAL

## 2020-06-29 VITALS
BODY MASS INDEX: 41.8 KG/M2 | HEART RATE: 64 BPM | WEIGHT: 292 LBS | DIASTOLIC BLOOD PRESSURE: 66 MMHG | SYSTOLIC BLOOD PRESSURE: 110 MMHG | HEIGHT: 70 IN

## 2020-06-29 LAB
ANION GAP SERPL CALCULATED.3IONS-SCNC: 8 MMOL/L (ref 3–16)
BUN BLDV-MCNC: 19 MG/DL (ref 7–20)
CALCIUM SERPL-MCNC: 8.7 MG/DL (ref 8.3–10.6)
CHLORIDE BLD-SCNC: 101 MMOL/L (ref 99–110)
CO2: 28 MMOL/L (ref 21–32)
CREAT SERPL-MCNC: 1.1 MG/DL (ref 0.8–1.3)
GFR AFRICAN AMERICAN: >60
GFR NON-AFRICAN AMERICAN: >60
GLUCOSE BLD-MCNC: 115 MG/DL (ref 70–99)
POTASSIUM SERPL-SCNC: 4.6 MMOL/L (ref 3.5–5.1)
PRO-BNP: 2018 PG/ML (ref 0–124)
SODIUM BLD-SCNC: 137 MMOL/L (ref 136–145)

## 2020-06-29 PROCEDURE — 80048 BASIC METABOLIC PNL TOTAL CA: CPT

## 2020-06-29 PROCEDURE — 36415 COLL VENOUS BLD VENIPUNCTURE: CPT

## 2020-06-29 PROCEDURE — 83880 ASSAY OF NATRIURETIC PEPTIDE: CPT

## 2020-06-29 PROCEDURE — 99214 OFFICE O/P EST MOD 30 MIN: CPT | Performed by: INTERNAL MEDICINE

## 2020-06-29 NOTE — TELEPHONE ENCOUNTER
Warfarin prescription phoned into [facility] under Dr. Easton Friday  Warfarin 5 mg tabs  Take 2.5 mg on Sun and 5 mg all other days of the week  90 days   2 refills

## 2020-07-02 ENCOUNTER — TELEPHONE (OUTPATIENT)
Dept: CARDIOLOGY CLINIC | Age: 70
End: 2020-07-02

## 2020-07-02 NOTE — TELEPHONE ENCOUNTER
----- Message from Kodi Patel MD sent at 7/2/2020  1:57 PM EDT -----  Please make sure the patient gets this message. MrJosephine Armaan Durant, your labs suggest that you might be holding more water. I suggest you take your lasix 1-2 days a week and continue the spironolactone. Getting back on the CPAP as soon as possible will also help.   Kodi Patel

## 2020-07-06 ENCOUNTER — OFFICE VISIT (OUTPATIENT)
Dept: ORTHOPEDIC SURGERY | Age: 70
End: 2020-07-06
Payer: COMMERCIAL

## 2020-07-06 VITALS — HEIGHT: 70 IN | BODY MASS INDEX: 41.23 KG/M2 | TEMPERATURE: 96.7 F | WEIGHT: 288 LBS

## 2020-07-06 PROCEDURE — 20610 DRAIN/INJ JOINT/BURSA W/O US: CPT | Performed by: ORTHOPAEDIC SURGERY

## 2020-07-06 PROCEDURE — 99213 OFFICE O/P EST LOW 20 MIN: CPT | Performed by: ORTHOPAEDIC SURGERY

## 2020-07-06 RX ORDER — METHYLPREDNISOLONE ACETATE 40 MG/ML
40 INJECTION, SUSPENSION INTRA-ARTICULAR; INTRALESIONAL; INTRAMUSCULAR; SOFT TISSUE ONCE
Status: COMPLETED | OUTPATIENT
Start: 2020-07-06 | End: 2020-07-06

## 2020-07-06 RX ORDER — ROPIVACAINE HYDROCHLORIDE 5 MG/ML
30 INJECTION, SOLUTION EPIDURAL; INFILTRATION; PERINEURAL ONCE
Status: COMPLETED | OUTPATIENT
Start: 2020-07-06 | End: 2020-07-06

## 2020-07-06 RX ADMIN — ROPIVACAINE HYDROCHLORIDE 30 ML: 5 INJECTION, SOLUTION EPIDURAL; INFILTRATION; PERINEURAL at 08:56

## 2020-07-06 RX ADMIN — METHYLPREDNISOLONE ACETATE 40 MG: 40 INJECTION, SUSPENSION INTRA-ARTICULAR; INTRALESIONAL; INTRAMUSCULAR; SOFT TISSUE at 08:54

## 2020-07-06 RX ADMIN — ROPIVACAINE HYDROCHLORIDE 30 ML: 5 INJECTION, SOLUTION EPIDURAL; INFILTRATION; PERINEURAL at 08:54

## 2020-07-06 RX ADMIN — METHYLPREDNISOLONE ACETATE 40 MG: 40 INJECTION, SUSPENSION INTRA-ARTICULAR; INTRALESIONAL; INTRAMUSCULAR; SOFT TISSUE at 08:55

## 2020-07-06 NOTE — PROGRESS NOTES
Chief Complaint  Follow-up (fu marianna knees --  last inj 11/11/2019  -- injections help till this past feb/march)      History of Present Illness:  Lynette Davis is a pleasant 72 y.o. male following up for bilateral knees. He last had bilateral corticosteroid injections 11/11/2019 with good relief. He denies any new injury. He can't take oral NSAIDs due to coumadin use. He uses tylenol for pain control. No fevers or chills. He uses a brace which he also feels helps. Medical History:  Patient's medications, allergies, past medical, surgical, social and family histories were reviewed and updated as appropriate. Pertinent items are noted in HPI  Review of systems reviewed from Patient History Form completed today and available in the patient's chart under the Media tab. Vital Signs:  Temp 96.7 °F (35.9 °C)   Ht 5' 10\" (1.778 m)   Wt 288 lb (130.6 kg)   BMI 41.32 kg/m²         Neuro: Alert & oriented x 3,  normal,  no focal deficits noted. Normal affect. Eyes: sclera clear  Ears: Normal external ear  Mouth:  No perioral lesions  Pulm: Respirations unlabored and regular  Pulse: Extremities well perfused. 2+ peripheral pulses. Skin: Warm. No ulcerations. Constitutional: The physical examination finds the patient to be well-developed and well-nourished. The patient is alert and oriented x3 and was cooperative throughout the visit. Gait: No use of assistive devices. antalgic gait. Left knee exam    Alignment: varusl alignment. Inspection/skin: Skin is intact without erythema or ecchymosis. No gross deformity. Palpation: moderate crepitus. medial joint line tenderness present. Range of Motion: 5-115    Strength: Normal quadriceps development. Effusion: No effusion or swelling present. Ligamentous stability: No cruciate or collateral ligament instability. Neurologic and vascular: Skin is warm and well-perfused. Sensation is intact to light-touch.        Right knee exam    Alignment: varus alignment. Inspection/skin: Skin is intact without erythema or ecchymosis. No gross deformity. Palpation: moderate crepitus. medial joint line tenderness present. Range of Motion: 7-95     Strength: Normal quadriceps development. Effusion: No effusion or swelling present. Ligamentous stability: No cruciate or collateral ligament instability. Neurologic and vascular: Skin is warm and well-perfused. Sensation is intact to light-touch. Radiology:       Pertinent imaging reviewed, images only - no report available. Radiographs were obtained and reviewed in the office; 1 views: bilateral PA  Impression: unchanged moderate/severe tricompartmental osteoarthritis worse in the medial compartment        Assessment :  Bilateral knee primary osteoarthritis    Impression:  Encounter Diagnoses   Name Primary?  Primary osteoarthritis of right knee Yes    Primary osteoarthritis of left knee        Office Procedures:  Orders Placed This Encounter   Procedures    XR Knee Bilateral Standing     Standing Status:   Future     Number of Occurrences:   1     Standing Expiration Date:   7/6/2021     Order Specific Question:   Reason for exam:     Answer:   Pain    CA ARTHROCENTESIS ASPIR&/INJ MAJOR JT/BURSA W/O US    CA ARTHROCENTESIS ASPIR&/INJ MAJOR JT/BURSA W/O US         Plan: Pertinent imaging was reviewed. The etiology, natural history, and treatment options for the disorder were discussed. The roles of activity medication, antiinflammatories, injections, bracing, physical therapy, and surgical interventions were all described to the patient and questions were answered. We believe patient is a candidate for bilateral knee corticosteroid injections today, continuation of home exercise program and brace use. Procedure:  After informed consent was provided, the skin was cleansed and prepped over bilateral knee suprapatellar lateral approach.   Using a 25 gauge needle an injection with 2 mL of 40 mg/ml Depo-Medrol and 3 mL of 0.5% ropivicaine was injected without difficulty sequentially into each knee. The needle was withdrawn and the puncture site sealed with a Band-Aid. The patient tolerated the procedure well. The patient was not scheduled for a corticosteroid injection today and required a comprehensive history and physical examination. Follow-up on an as needed basis. Kaylan Higuera is in agreement with this plan. All questions were answered to patient's satisfaction and was encouraged to call with any further questions. Herman Parikh MD  Board Certified Orthopaedic Surgeon, [address] [place] [address]  7/6/2020         The encounter with Marifer Rowland was supervised by Dr Choco Palmer who personally examined the patient and reviewed the plan. This dictation was performed with a verbal recognition program (DRAGON) and it was checked for errors. It is possible that there are still dictated errors within this office note. If so, please bring any errors to my attention for an addendum. All efforts were made to ensure that this office note is accurate. Attending Attestation:    I, Dr. Choco Palmer, supervised my sports medicine fellow in the evaluation and development of a treatment plan  for this patient. I personally interviewed the patient and performed a physical examination. In addition, I discussed the patient's condition and treatment options with them. All of their questions were answered. Attestation:  I was physically present and performed my own examination of this patient and have discussed the case, including pertinent history and exam findings with the fellow. I agree with the documented assessment and plan. Cruzito Contreras.  Choco Palmer MD

## 2020-07-13 ENCOUNTER — TELEPHONE (OUTPATIENT)
Dept: PHARMACY | Age: 70
End: 2020-07-13

## 2020-07-20 ENCOUNTER — ANTI-COAG VISIT (OUTPATIENT)
Dept: PHARMACY | Age: 70
End: 2020-07-20
Payer: COMMERCIAL

## 2020-07-20 VITALS — TEMPERATURE: 97.3 F

## 2020-07-20 LAB — INTERNATIONAL NORMALIZATION RATIO, POC: 3.6

## 2020-07-20 PROCEDURE — 85610 PROTHROMBIN TIME: CPT

## 2020-07-20 PROCEDURE — 99211 OFF/OP EST MAY X REQ PHY/QHP: CPT

## 2020-07-20 NOTE — PROGRESS NOTES
Mr. Lynette Davis is a 72 y.o. y/o male with history of Afib   He presents today for anticoagulation monitoring and adjustment. Pertinent PMH: HTN, CAD     Patient Reported Findings:  Yes     No  [x]   []       Patient verifies current dosing regimen as listed -patient has not been taking 2.5mg on Thursdays as instructed, instead has been taking 2.5mg on Sundays and 5mg on AODs  []   [x]       S/S bleeding/bruising/swelling/SOB -denies     []   [x]       Blood in urine or stool- denies   []   [x]       Procedures scheduled in the future at this time   []   [x]       Missed dose    [x]   []       Extra dose took 5 mg on sun rather than 2.5 mg    []   [x]       Change in medications -denies   [x]   []       Change in health/diet/appetite Has increased his salad diet in an effort to lose weight and reduce the carbs --> states that states that he has lost 35 lbs --> 38 lbs, consistent with greens he states  --> states that for the holidays he has been eating different but plans to return to normal --> is working to return to normal diet ---> states he is still working on getting back to his normal diet.  \"Not quiet there yet though\". -> diet back to normal now, reports about 1 servings per day of green vegetables---> thinks pretty decent diet, no NVD, has lost 12 lbs --> states diet has been off, have been remodeling house so have been eating out a lot more --> states that he has eating more vegetables but states that it is still less than normal   []   [x]       Change in alcohol use   []   [x]       Change in activity   []   [x]       Hospital admission  []   [x]       Emergency department visit  []   [x]       Other complaints       Clinical Outcomes:   Yes     No  []   [x]       Major bleeding event  []   [x]       Thromboembolic event    Takes warfarin in AM  Duration of warfarin Therapy: indefinitely  INR Range:  2.0-3.0     INR is 3.6 today from extra dose yesterday and likely still less vit k   Hold dose

## 2020-07-28 RX ORDER — FUROSEMIDE 40 MG/1
TABLET ORAL
Qty: 30 TABLET | Refills: 11 | Status: SHIPPED | OUTPATIENT
Start: 2020-07-28 | End: 2021-02-05 | Stop reason: SDUPTHER

## 2020-07-31 RX ORDER — ERGOCALCIFEROL 1.25 MG/1
CAPSULE ORAL
Qty: 12 CAPSULE | Refills: 3 | Status: SHIPPED | OUTPATIENT
Start: 2020-07-31 | End: 2021-06-01 | Stop reason: ALTCHOICE

## 2020-07-31 NOTE — TELEPHONE ENCOUNTER
RX APPROVAL:      Refill:   Requested Prescriptions     Pending Prescriptions Disp Refills    vitamin D (ERGOCALCIFEROL) 1.25 MG (47533 UT) CAPS capsule [Pharmacy Med Name: VITAMIN D2 50,000IU (ERGO) CAP RX] 12 capsule 1     Sig: TAKE 1 CAPSULE BY MOUTH 1 TIME A WEEK      Last OV: 6/29/2020   Last EKG:   Last Labs:   Lab Results   Component Value Date    GLUCOSE 115 06/29/2020    BUN 19 06/29/2020    CREATININE 1.1 06/29/2020    LABGLOM >60 06/29/2020     06/29/2020    K 4.6 06/29/2020     06/29/2020    CO2 28 06/29/2020    CALCIUM 8.7 06/29/2020     Lab Results   Component Value Date     06/29/2020     06/29/2020    CO2 28 06/29/2020    ANIONGAP 8 06/29/2020    GLUCOSE 115 06/29/2020    BUN 19 06/29/2020    CREATININE 1.1 06/29/2020    LABGLOM >60 06/29/2020    GFRAA >60 06/29/2020    GFRAA >60 02/11/2012    CALCIUM 8.7 06/29/2020    PROT 6.9 03/04/2020    PROT 6.7 10/25/2010    LABALBU 4.0 03/04/2020    BILITOT 0.6 03/04/2020    ALKPHOS 77 03/04/2020    AST 18 03/04/2020    ALT 12 03/04/2020    GLOB 2.9 03/04/2020     Lab Results   Component Value Date    ALT 12 03/04/2020    AST 18 03/04/2020     Lab Results   Component Value Date    K 4.6 06/29/2020       Plan and labs reviewed

## 2020-08-06 ENCOUNTER — TELEPHONE (OUTPATIENT)
Dept: PHARMACY | Age: 70
End: 2020-08-06

## 2020-08-18 ENCOUNTER — ANTI-COAG VISIT (OUTPATIENT)
Dept: PHARMACY | Age: 70
End: 2020-08-18
Payer: COMMERCIAL

## 2020-08-18 VITALS — TEMPERATURE: 96.9 F

## 2020-08-18 LAB — INTERNATIONAL NORMALIZATION RATIO, POC: 2.3

## 2020-08-18 PROCEDURE — 85610 PROTHROMBIN TIME: CPT

## 2020-08-18 PROCEDURE — 99211 OFF/OP EST MAY X REQ PHY/QHP: CPT

## 2020-08-18 NOTE — PROGRESS NOTES
Mr. Kylie Gay is a 72 y.o. y/o male with history of Afib   He presents today for anticoagulation monitoring and adjustment. Pertinent PMH: HTN, CAD     Patient Reported Findings:  Yes     No  [x]   []       Patient verifies current dosing regimen as listed -patient has not been taking 2.5mg on Thursdays as instructed, instead has been taking 2.5mg on Sundays and 5mg on AODs--->confirms dose   []   [x]       S/S bleeding/bruising/swelling/SOB -denies     []   [x]       Blood in urine or stool- denies   []   [x]       Procedures scheduled in the future at this time   []   [x]       Missed dose  - denies   [x]   []       Extra dose denies   []   [x]       Change in medications -denies   [x]   []       Change in health/diet/appetite Has increased his salad diet in an effort to lose weight and reduce the carbs --> states that states that he has lost 35 lbs --> 38 lbs, consistent with greens he states  --> states that for the holidays he has been eating different but plans to return to normal --> is working to return to normal diet ---> states he is still working on getting back to his normal diet.  \"Not quiet there yet though\". -> diet back to normal now, reports about 1 servings per day of green vegetables---> thinks pretty decent diet, no NVD, has lost 12 lbs --> states diet has been off, have been remodeling house so have been eating out a lot more --> states that he has eating more vegetables but states that it is still less than normal ---> no changes  []   [x]       Change in alcohol use   []   [x]       Change in activity   []   [x]       Hospital admission  []   [x]       Emergency department visit  []   [x]       Other complaints       Clinical Outcomes:   Yes     No  []   [x]       Major bleeding event  []   [x]       Thromboembolic event    Takes warfarin in AM  Duration of warfarin Therapy: indefinitely  INR Range:  2.0-3.0     INR is 2.3 today   Continue weekly dose of 2.5 mg on Sunday and 5 mg all other days of the week. Encouraged to maintain a consistency of vegetables/salads.   Recheck INR in 4 weeks, 9/15    Referring cardiologist is Dr. Bhargavi Walker. ---> referring is now Dr. Inga Lockett as of 7/28/2020  INR (no units)   Date Value   08/18/2020 2.3   07/20/2020 3.6   06/12/2020 3.2   05/14/2020 2.30   04/29/2020 3.50   03/25/2020 2.3   07/11/2019 2.34 (H)   07/10/2019 2.53 (H)     CLINICAL PHARMACY CONSULT:  [facility] Tracking Only    PHSO: No  Total # of Interventions Recommended: 0  - Maintenance Safety Lab Monitoring #: 1  Total Interventions Accepted: 0  Time Spent (min): [address] Lukas Singh, PharmD

## 2020-08-28 RX ORDER — LISINOPRIL AND HYDROCHLOROTHIAZIDE 20; 12.5 MG/1; MG/1
1 TABLET ORAL DAILY
Qty: 90 TABLET | Refills: 1 | Status: SHIPPED | OUTPATIENT
Start: 2020-08-28 | End: 2021-02-23

## 2020-08-28 NOTE — TELEPHONE ENCOUNTER
Medication Refill    Medication needing refilled:metoprolol tartrate (LOPRESSOR) 25 MG tablet        Doseage of the medication:25mg    How are you taking this medication (QD, BID, TID, QID, PRN):TAKE 1 TABLET BY MOUTH TWICE DAILY    30 or 90 day supply called in:90    Which Pharmacy are we sending the medication to?:[facility] #[id number] Eli Albrecht, [facility] [address] [phone]       Medication Refill    Medication needing refilled:lisinopril-hydrochlorothiazide (PRINZIDE;ZESTORETIC) 20-12.5 MG per tablet         Doseage of the medication:12.5mg    How are you taking this medication (QD, BID, TID, QID, PRN):Take 1 tablet by mouth daily     30 or 90 day supply called in:90    Which Pharmacy are we sending the medication to?:[facility] STORE #[id number] Eli Albrecht, [address] [place] [address] [phone]

## 2020-09-22 ENCOUNTER — ANTI-COAG VISIT (OUTPATIENT)
Dept: PHARMACY | Age: 70
End: 2020-09-22
Payer: COMMERCIAL

## 2020-09-22 VITALS — TEMPERATURE: 96.2 F

## 2020-09-22 LAB — INTERNATIONAL NORMALIZATION RATIO, POC: 5.1

## 2020-09-22 PROCEDURE — 85610 PROTHROMBIN TIME: CPT

## 2020-09-22 PROCEDURE — 99212 OFFICE O/P EST SF 10 MIN: CPT

## 2020-09-22 NOTE — PROGRESS NOTES
Mr. Irena Runner is a 72 y.o. y/o male with history of Afib   He presents today for anticoagulation monitoring and adjustment. Pertinent PMH: HTN, CAD     Patient Reported Findings:  Yes     No  [x]   []       Patient verifies current dosing regimen as listed -   []   [x]       S/S bleeding/bruising/swelling/SOB -denies     []   [x]       Blood in urine or stool- denies   []   [x]       Procedures scheduled in the future at this time   []   [x]       Missed dose  - denies   [x]   []       Extra dose took 5 mg on Sun instead of 2.5 mg   []   [x]       Change in medications -denies   [x]   []       Change in health/diet/appetite Has increased his salad diet in an effort to lose weight and reduce the carbs --> states that states that he has lost 35 lbs --> 38 lbs, consistent with greens he states  --> states that for the holidays he has been eating different but plans to return to normal --> is working to return to normal diet ---> states he is still working on getting back to his normal diet.  \"Not quiet there yet though\". -> diet back to normal now, reports about 1 servings per day of green vegetables---> thinks pretty decent diet, no NVD, has lost 12 lbs --> states diet has been off, have been remodeling house so have been eating out a lot more --> states that he has eating more vegetables but states that it is still less than normal ---> states that diet has been erratic d/t mother in law passing, has not been eating many greens   []   [x]       Change in alcohol use   []   [x]       Change in activity   []   [x]       Hospital admission  []   [x]       Emergency department visit  []   [x]       Other complaints       Clinical Outcomes:   Yes     No  []   [x]       Major bleeding event  []   [x]       Thromboembolic event    Takes warfarin in AM  Duration of warfarin Therapy: indefinitely  INR Range:  2.0-3.0     INR is 5.1 today d/t doubled dose and less vit k  Plans to return to normal vit k this week,
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
## 2020-09-30 ENCOUNTER — OFFICE VISIT (OUTPATIENT)
Dept: CARDIOLOGY CLINIC | Age: 70
End: 2020-09-30
Payer: COMMERCIAL

## 2020-09-30 ENCOUNTER — HOSPITAL ENCOUNTER (OUTPATIENT)
Age: 70
Discharge: HOME | End: 2020-09-30
Payer: COMMERCIAL

## 2020-09-30 VITALS
HEIGHT: 70 IN | DIASTOLIC BLOOD PRESSURE: 70 MMHG | WEIGHT: 293 LBS | HEART RATE: 90 BPM | OXYGEN SATURATION: 94 % | SYSTOLIC BLOOD PRESSURE: 110 MMHG | BODY MASS INDEX: 41.95 KG/M2

## 2020-09-30 LAB
ANION GAP SERPL CALCULATED.3IONS-SCNC: 10 MMOL/L (ref 3–16)
BUN BLDV-MCNC: 22 MG/DL (ref 7–20)
CALCIUM SERPL-MCNC: 9.3 MG/DL (ref 8.3–10.6)
CHLORIDE BLD-SCNC: 105 MMOL/L (ref 99–110)
CO2: 25 MMOL/L (ref 21–32)
CREAT SERPL-MCNC: 1.3 MG/DL (ref 0.8–1.3)
GFR AFRICAN AMERICAN: >60
GFR NON-AFRICAN AMERICAN: 55
GLUCOSE BLD-MCNC: 115 MG/DL (ref 70–99)
POTASSIUM SERPL-SCNC: 4.7 MMOL/L (ref 3.5–5.1)
PRO-BNP: 2296 PG/ML (ref 0–124)
SODIUM BLD-SCNC: 140 MMOL/L (ref 136–145)

## 2020-09-30 PROCEDURE — 99215 OFFICE O/P EST HI 40 MIN: CPT | Performed by: INTERNAL MEDICINE

## 2020-09-30 PROCEDURE — 80048 BASIC METABOLIC PNL TOTAL CA: CPT

## 2020-09-30 PROCEDURE — 36415 COLL VENOUS BLD VENIPUNCTURE: CPT

## 2020-09-30 PROCEDURE — 83880 ASSAY OF NATRIURETIC PEPTIDE: CPT

## 2020-10-05 ENCOUNTER — OFFICE VISIT (OUTPATIENT)
Dept: ORTHOPEDIC SURGERY | Age: 70
End: 2020-10-05
Payer: COMMERCIAL

## 2020-10-05 VITALS — WEIGHT: 293 LBS | BODY MASS INDEX: 41.95 KG/M2 | HEIGHT: 70 IN | TEMPERATURE: 97 F

## 2020-10-05 PROCEDURE — 20610 DRAIN/INJ JOINT/BURSA W/O US: CPT | Performed by: ORTHOPAEDIC SURGERY

## 2020-10-05 PROCEDURE — 99213 OFFICE O/P EST LOW 20 MIN: CPT | Performed by: ORTHOPAEDIC SURGERY

## 2020-10-05 RX ORDER — ROPIVACAINE HYDROCHLORIDE 5 MG/ML
30 INJECTION, SOLUTION EPIDURAL; INFILTRATION; PERINEURAL ONCE
Status: COMPLETED | OUTPATIENT
Start: 2020-10-05 | End: 2020-10-05

## 2020-10-05 RX ORDER — METHYLPREDNISOLONE ACETATE 40 MG/ML
40 INJECTION, SUSPENSION INTRA-ARTICULAR; INTRALESIONAL; INTRAMUSCULAR; SOFT TISSUE ONCE
Status: COMPLETED | OUTPATIENT
Start: 2020-10-05 | End: 2020-10-05

## 2020-10-05 RX ADMIN — METHYLPREDNISOLONE ACETATE 40 MG: 40 INJECTION, SUSPENSION INTRA-ARTICULAR; INTRALESIONAL; INTRAMUSCULAR; SOFT TISSUE at 10:01

## 2020-10-05 RX ADMIN — ROPIVACAINE HYDROCHLORIDE 30 ML: 5 INJECTION, SOLUTION EPIDURAL; INFILTRATION; PERINEURAL at 10:01

## 2020-10-05 RX ADMIN — METHYLPREDNISOLONE ACETATE 40 MG: 40 INJECTION, SUSPENSION INTRA-ARTICULAR; INTRALESIONAL; INTRAMUSCULAR; SOFT TISSUE at 10:00

## 2020-10-05 NOTE — PROGRESS NOTES
The patient returns today for bilateral knee osteoarthritis. He had injections about 3 months ago and said this provided 68 weeks worth of relief. We again discussed Visco supplementation he feels that maybe after [place] he would like to try this. He said he had enough relief that last injections were gone play golf. ROS: Pertinent items are noted in HPI.     No notes on file    Past Medical History:  No date: Atrial fibrillation ([facility]  No date: CAD (coronary artery disease)  No date: Cancer ([facility]      Comment:  skin  No date: Diabetes mellitus ([facility]  No date: Hypercholesteremia  No date: Hypertension  No date: Obesity  No date: Sleep apnea  2/2014: Ulcer of pyloric antrum      Comment:  anemia/GI bleed while on NSAIDS     Past Surgical History:  No date: COLONOSCOPY  No date: CYST REMOVAL  8/24/06: DIAGNOSTIC CARDIAC CATH LAB PROCEDURE  No date: KNEE SURGERY      Comment:  arthroscopy bilateral  02/07/14: UPPER GASTROINTESTINAL ENDOSCOPY      Comment:  EGD/Colonoscopy with biopsy of ulcer  03/19/2018: UPPER GASTROINTESTINAL ENDOSCOPY      Comment:  with bx    Review of patient's family history indicates:  Problem: Heart Disease      Relation: Mother          Age of Onset: (Not Specified)  Problem: Cancer      Relation: Father          Age of Onset: (Not Specified)  Problem: Cancer      Relation: Sister          Age of Onset: (Not Specified)          Comment: breast      Social History    Socioeconomic History      Marital status:       Spouse name: None      Number of children: None      Years of education: None      Highest education level: None    Occupational History      None    Social Needs      Financial resource strain: None      Food insecurity        Worry: None        Inability: None      Transportation needs        Medical: None        Non-medical: None    Tobacco Use      Smoking status: Never Smoker      Smokeless tobacco: Never Used    Substance and Sexual Activity      Alcohol use: Yes        Alcohol/week: 1.0 standard drinks        Types: 1 Cans of beer per week        Comment: occas      Drug use: No      Sexual activity: Yes        Partners: Female    Lifestyle      Physical activity        Days per week: None        Minutes per session: None      Stress: None    Relationships      Social connections        Talks on phone: None        Gets together: None        Attends Je[employer] witness service: None        Active member of club or organization: None        Attends meetings of clubs or organizations: None        Relationship status: None      Intimate partner violence        Fear of current or ex partner: None        Emotionally abused: None        Physically abused: None        Forced sexual activity: None    Other Topics      Concerns:        None    Social History Narrative      None      Current Outpatient Medications:  lisinopril-hydroCHLOROthiazide (PRINZIDE;ZESTORETIC) 20-12.5 MG per tablet, Take 1 tablet by mouth daily, Disp: 90 tablet, Rfl: 1  metoprolol tartrate (LOPRESSOR) 25 MG tablet, Take 1 tablet by mouth 2 times daily, Disp: 180 tablet, Rfl: 3  vitamin D (ERGOCALCIFEROL) 1.25 MG (07870 UT) CAPS capsule, TAKE 1 CAPSULE BY MOUTH 1 TIME A WEEK, Disp: 12 capsule, Rfl: 3  furosemide (LASIX) 40 MG tablet, TAKE 1 TABLET BY MOUTH DAILY, Disp: 30 tablet, Rfl: 11  CARTIA  MG extended release capsule, TAKE 1 CAPSULE BY MOUTH DAILY, Disp: 90 capsule, Rfl: 3  spironolactone (ALDACTONE) 25 MG tablet, Take 1 tablet by mouth daily, Disp: 90 tablet, Rfl: 3  warfarin (COUMADIN) 5 MG tablet, One tab daily, as directedOn Saturday 7/13 and Sunday 7/14 take only 1/2 tabs. Get INR on Monday 7/15 (Patient taking differently: [facility], as directedOn Saturday 7/13 and Sunday 7/14 take only 1/2 tabs.   Get INR on Monday 7/15), Disp: 30 tablet, Rfl: 3  pantoprazole (PROTONIX) 40 MG tablet, Take 1 tablet by mouth every morning (before breakfast), Disp: 90 tablet, Rfl: 0  metFORMIN (GLUCOPHAGE) 1000 MG tablet, Take 1,000 mg by mouth 2 times daily (with meals). , Disp: , Rfl:   zolpidem (AMBIEN) 10 MG tablet, nightly as needed . , Disp: , Rfl: 2  atorvastatin (LIPITOR) 10 MG tablet, Take 1 tablet by mouth daily. , Disp: 30 tablet, Rfl: 0  aspirin EC 81 MG EC tablet, Take 1 tablet by mouth daily. , Disp: 30 tablet, Rfl: 3    No current facility-administered medications for this visit. -- No Known Allergies     VITAL SIGNS:  Temp 97 °F (36.1 °C)   Ht 5' 10\" (1.778 m)   Wt 293 lb (132.9 kg)   BMI 42.04 kg/m²   Examination right knee shows about a 15 degree extension lag. He has a varus deformity. He has tenderness to medial joint line. There is no warmth erythema or effusion. Examination left knee shows no warmth, erythema, or effusion. He has only about a 10 degree extension lag on the side. He has mainly medial joint line tenderness. Previous standing x-rays show he is essentially bone-on-bone in the medial compartment of both knees with varus deformities. Plan: After sterile prep injected the left knee with 80 mg of Depo-Medrol and 15 mg ropivacaine. The patient tolerated this procedure well. After sterile prep we injected the right knee with 80 mg of Depo-Medrol and 50 mg ropivacaine for osteoarthritis. The patient tolerated this procedure well.

## 2020-10-13 ENCOUNTER — TELEPHONE (OUTPATIENT)
Dept: PHARMACY | Age: 70
End: 2020-10-13

## 2020-10-19 ENCOUNTER — OFFICE VISIT (OUTPATIENT)
Dept: ORTHOPEDIC SURGERY | Age: 70
End: 2020-10-19
Payer: COMMERCIAL

## 2020-10-19 ENCOUNTER — ANTI-COAG VISIT (OUTPATIENT)
Dept: PHARMACY | Age: 70
End: 2020-10-19
Payer: COMMERCIAL

## 2020-10-19 VITALS — WEIGHT: 285 LBS | HEIGHT: 71 IN | TEMPERATURE: 97.9 F | BODY MASS INDEX: 39.9 KG/M2

## 2020-10-19 VITALS — TEMPERATURE: 96.9 F

## 2020-10-19 LAB — INTERNATIONAL NORMALIZATION RATIO, POC: 2.4

## 2020-10-19 PROCEDURE — 20526 THER INJECTION CARP TUNNEL: CPT | Performed by: ORTHOPAEDIC SURGERY

## 2020-10-19 PROCEDURE — 85610 PROTHROMBIN TIME: CPT

## 2020-10-19 PROCEDURE — 99213 OFFICE O/P EST LOW 20 MIN: CPT | Performed by: ORTHOPAEDIC SURGERY

## 2020-10-19 PROCEDURE — 99211 OFF/OP EST MAY X REQ PHY/QHP: CPT

## 2020-10-19 RX ORDER — METHYLPREDNISOLONE ACETATE 40 MG/ML
40 INJECTION, SUSPENSION INTRA-ARTICULAR; INTRALESIONAL; INTRAMUSCULAR; SOFT TISSUE ONCE
Status: COMPLETED | OUTPATIENT
Start: 2020-10-19 | End: 2020-10-19

## 2020-10-19 RX ADMIN — METHYLPREDNISOLONE ACETATE 40 MG: 40 INJECTION, SUSPENSION INTRA-ARTICULAR; INTRALESIONAL; INTRAMUSCULAR; SOFT TISSUE at 10:38

## 2020-10-19 NOTE — PROGRESS NOTES
[facility]  Office Visit  Follow up  Date:  10/19/2020    Name:  Francisco Javier Segal  Address:  [address]    :  1950      Age:   72 y.o.    SSN:  [id number]      Medical Record Number:  [id number]    Chief Complaint:    Follow up for R wrist/eblow     HPI:   Francisco Javier Segal is a 72 y.o. male who is following up for his right carpal tunnel/cubital tunnel syndrome with associated lateral epicondylitis. He had a carpal tunnel injection in  which was helpful for about 5 months. Recently the numbness in his hand has returned especially at night. He had no recent injuries or falls. He denies any new numbness, tingling, fevers, chills, chest pain, shortness of breath, or any other new significant symptoms. Past History:  Past Medical History:   Diagnosis Date    [facility]     CAD (coronary artery disease)     Cancer ([facility]     skin    Diabetes mellitus ([facility]     Hypercholesteremia     Hypertension     Obesity     Sleep apnea     Ulcer of pyloric antrum 2014    anemia/GI bleed while on NSAIDS       Past Surgical History:   Procedure Laterality Date    COLONOSCOPY      CYST REMOVAL      DIAGNOSTIC CARDIAC CATH LAB PROCEDURE  06    KNEE SURGERY      arthroscopy bilateral    UPPER GASTROINTESTINAL ENDOSCOPY  14    EGD/Colonoscopy with biopsy of ulcer    UPPER GASTROINTESTINAL ENDOSCOPY  2018    with bx       Social History     Tobacco Use    Smoking status: Never Smoker    Smokeless tobacco: Never Used   Substance Use Topics    Alcohol use: Yes     Alcohol/week: 1.0 standard drinks     Types: 1 Cans of beer per week     Comment: occas    Drug use: No        Family History:  family history includes Cancer in his father and sister; Heart Disease in his mother.          Current Outpatient Medications:     lisinopril-hydroCHLOROthiazide (PRINZIDE;ZESTORETIC) 20-12.5 MG per tablet, Take 1 tablet by mouth daily, Disp: 90 tablet, Rfl: 1    metoprolol tartrate (LOPRESSOR) 25 MG tablet, Take 1 tablet by mouth 2 times daily, Disp: 180 tablet, Rfl: 3    vitamin D (ERGOCALCIFEROL) 1.25 MG (64022 UT) CAPS capsule, TAKE 1 CAPSULE BY MOUTH 1 TIME A WEEK, Disp: 12 capsule, Rfl: 3    furosemide (LASIX) 40 MG tablet, TAKE 1 TABLET BY MOUTH DAILY, Disp: 30 tablet, Rfl: 11    CARTIA  MG extended release capsule, TAKE 1 CAPSULE BY MOUTH DAILY, Disp: 90 capsule, Rfl: 3    spironolactone (ALDACTONE) 25 MG tablet, Take 1 tablet by mouth daily, Disp: 90 tablet, Rfl: 3    warfarin (COUMADIN) 5 MG tablet, One tab daily, as directed On Saturday 7/13 and Sunday 7/14 take only 1/2 tabs. Get INR on Monday 7/15 (Patient taking differently: Managed by [facility] One tab daily, as directed On Saturday 7/13 and Sunday 7/14 take only 1/2 tabs. Get INR on Monday 7/15), Disp: 30 tablet, Rfl: 3    pantoprazole (PROTONIX) 40 MG tablet, Take 1 tablet by mouth every morning (before breakfast), Disp: 90 tablet, Rfl: 0    metFORMIN (GLUCOPHAGE) 1000 MG tablet, Take 1,000 mg by mouth 2 times daily (with meals). , Disp: , Rfl:     zolpidem (AMBIEN) 10 MG tablet, nightly as needed . , Disp: , Rfl: 2    atorvastatin (LIPITOR) 10 MG tablet, Take 1 tablet by mouth daily. , Disp: 30 tablet, Rfl: 0    aspirin EC 81 MG EC tablet, Take 1 tablet by mouth daily. , Disp: 30 tablet, Rfl: 3      Allergies   Allergen Reactions    No Known Allergies          Review of Systems: The review is negative with the exception of those things mentioned in the HPI    No notes on file    Physical Exam:  Temp 97.9 °F (36.6 °C)   Ht 5' 11\" (1.803 m)   Wt 285 lb (129.3 kg)   BMI 39.75 kg/m²         General: No acute distress, well nourished  CV: No obvious peripheral edema.  Normal peripheral pulses  Resp: nonlabored respiration, symmetrical chest expansion  Neuro: Alert & oriented x 3  Psych: Appropriate mood and affect  RUE: positive tinel at wrist and eblow. Tender at extensor origin. No pain  Sensation intact to light touch in the median, ulnar, radial, axillary Nerve distributions. Motor intact for the function of the AIN, PIN, Ulnar nerves. < 2 sec cap refill in digits. Radiographic:  No new imaging  From June showing moderately to severe right carpal and cubital tunnel syndrome    Assessment:  Alcira Mason is a 72 y.o. male RHD with R cubital tunnel syndrome, carpal tunnel syndrome, lateral epicondylitis     Office Procedures:  No orders of the defined types were placed in this encounter. Plan:   - carpal tunnel Injection today  -He also follows in the office for bilateral knee osteoarthritis. When he returns well discussed possible viscosupplementation injections        Procedure note -carpal tunnel injection right  After discussion of the risks, benefits, alternatives, agreeable to proceed. Skin cleaned with alcohol under sterile technique. 22-gauge needle inserted proximal to volar wrist crease and directed into the carpal tunnel, 1 cc of Depo-Medrol injected. Sterile dressing was applied. Tolerated procedure well. Solitario Solomon MD  Orthopaedic Fellow  [address]      The encounter with Alcira Mason was supervised by Dr Columba Liao who personally examined the patient and reviewed the plan. This dictation was performed with a verbal recognition program (DRAGON) and it was checked for errors. It is possible that there are still dictated errors within this office note. If so, please bring any errors to my attention for an addendum. All efforts were made to ensure that this office note is accurate. Attestation:  I was physically present and performed my own examination of this patient and have discussed the case, including pertinent history and exam findings with the fellow. I agree with the documented assessment and plan. Navdeep Ruiz.  Columba Liao MD

## 2020-10-19 NOTE — PROGRESS NOTES
Mr. Maria D [address]ue[address] is a 72 y.o. y/o male with history of Afib   He presents today for anticoagulation monitoring and adjustment. Pertinent PMH: HTN, CAD     Patient Reported Findings:  Yes     No  [x]   []       Patient verifies current dosing regimen as listed -   []   [x]       S/S bleeding/bruising/swelling/SOB -denies     []   [x]       Blood in urine or stool- denies   []   [x]       Procedures scheduled in the future at this time   []   [x]       Missed dose  - denies   []   [x]       Extra dose   []   [x]       Change in medications -denies    [x]   []       Change in health/diet/appetite Has increased his salad diet in an effort to lose weight and reduce the carbs --> states that states that he has lost 35 lbs --> 38 lbs, consistent with greens he states  --> states that for the holidays he has been eating different but plans to return to normal --> is working to return to normal diet ---> states he is still working on getting back to his normal diet.  \"Not quiet there yet though\". -> diet back to normal now, reports about 1 servings per day of green vegetables---> thinks pretty decent diet, no NVD, has lost 12 lbs --> states diet has been off, have been remodeling house so have been eating out a lot more --> states that he has eating more vegetables but states that it is still less than normal ---> states that diet has been erratic d/t mother in law passing, has not been eating many greens--> not consistent with recent MIL passing and taking care of DORENE   []   [x]       Change in alcohol use-- occasional   []   [x]       Change in activity   []   [x]       Hospital admission  []   [x]       Emergency department visit  []   [x]       Other complaints       Clinical Outcomes:   Yes     No  []   [x]       Major bleeding event  []   [x]       Thromboembolic event    Takes warfarin in AM  Duration of warfarin Therapy: indefinitely  INR Range:  2.0-3.0     INR is 2.4 today  Continue weekly dose of 2.5 mg on Sunday and 5 mg all other days of the week. Encouraged to maintain a consistency of vegetables/salads.   Recheck INR in 4 weeks, 11/16    Referring cardiologist is Dr. Verónica Moore  INR (no units)   Date Value   10/19/2020 2.4   09/22/2020 5.1   08/18/2020 2.3   07/20/2020 3.6   05/14/2020 2.30   04/29/2020 3.50   07/11/2019 2.34 (H)   07/10/2019 2.53 (H)   CLINICAL PHARMACY CONSULT: MED RECONCILIATION/REVIEW ADDENDUM    For Pharmacy Admin Tracking Only    PHSO: No  Total # of Interventions Recommended: 0  - Maintenance Safety Lab Monitoring #: 1  Total Interventions Accepted: 0  Time Spent (min): Chay Baker, PharmD

## 2020-11-09 ENCOUNTER — TELEPHONE (OUTPATIENT)
Dept: PHARMACY | Age: 70
End: 2020-11-09

## 2020-11-09 NOTE — TELEPHONE ENCOUNTER
Pt called to cancel CC appt on 11/16 , r/s on 11/23. His wife was diagnosed w/ COVID on 11/6 , MD wants them to quarantine .

## 2020-11-10 ENCOUNTER — OFFICE VISIT (OUTPATIENT)
Dept: PRIMARY CARE CLINIC | Age: 70
End: 2020-11-10
Payer: COMMERCIAL

## 2020-11-10 PROCEDURE — 99211 OFF/OP EST MAY X REQ PHY/QHP: CPT | Performed by: NURSE PRACTITIONER

## 2020-11-10 NOTE — PROGRESS NOTES
Francisco Javier Chois received a viral test for COVID-19. They were educated on isolation and quarantine as appropriate. For any symptoms, they were directed to seek care from their PCP, given contact information to establish with a doctor, directed to an urgent care or the emergency room.

## 2020-11-12 LAB — SARS-COV-2, NAA: DETECTED

## 2020-12-07 ENCOUNTER — ANTI-COAG VISIT (OUTPATIENT)
Dept: PHARMACY | Age: 70
End: 2020-12-07
Payer: COMMERCIAL

## 2020-12-07 VITALS — TEMPERATURE: 97.1 F

## 2020-12-07 LAB — INTERNATIONAL NORMALIZATION RATIO, POC: 3.7

## 2020-12-07 PROCEDURE — 99211 OFF/OP EST MAY X REQ PHY/QHP: CPT

## 2020-12-07 PROCEDURE — 85610 PROTHROMBIN TIME: CPT

## 2020-12-07 NOTE — PROGRESS NOTES
Mr. Kath Honeycutt is a 72 y.o. y/o male with history of Afib   He presents today for anticoagulation monitoring and adjustment. Pertinent PMH: HTN, CAD     Patient Reported Findings:  Yes     No  [x]   []       Patient verifies current dosing regimen as listed -   []   [x]       S/S bleeding/bruising/swelling/SOB -denies     []   [x]       Blood in urine or stool- denies   []   [x]       Procedures scheduled in the future at this time   []   [x]       Missed dose  - denies   []   [x]       Extra dose   [x]   []       Change in medications - has been taking tylenol 2 tabs daily since being sick. Still taking currently     [x]   []       Change in health/diet/appetite Has increased his salad diet in an effort to lose weight and reduce the carbs --> states that states that he has lost 35 lbs --> 38 lbs, consistent with greens he states  --> states he is still working on getting back to his normal diet. \"Not quiet there yet though\". -> diet back to normal now, reports about 1 servings per day of green vegetables---> thinks pretty decent diet, no NVD, has lost 12 lbs --> states diet has been off, have been remodeling house so have been eating out a lot more --> states that he has eating more vegetables but states that it is still less than normal ---> states that diet has been erratic d/t mother in law passing, has not been eating many greens--> not consistent with recent MIL passing and taking care of DORENE --> states that d/t covid-19 he lost appetite, was not eating    []   [x]       Change in alcohol use-- occasional   []   [x]       Change in activity   []   [x]       Hospital admission  []   [x]       Emergency department visit  [x]   []       Other complaints recent illness with COVID-19.  Has improved       Clinical Outcomes:   Yes     No  []   [x]       Major bleeding event  []   [x]       Thromboembolic event    Takes warfarin in AM  Duration of warfarin Therapy: indefinitely  INR Range:  2.0-3.0     INR is 3.7 today after being sick with COVID-19   Since appetite returning to normal, hold dose tonight then continue weekly dose of 2.5 mg on Sunday and 5 mg all other days of the week. Encouraged to maintain a consistency of vegetables/salads.   Recheck INR in 2 weeks, 12/21    Referring cardiologist is Dr. Maria Teresa Ramirez  INR (no units)   Date Value   10/19/2020 2.4   09/22/2020 5.1   08/18/2020 2.3   07/20/2020 3.6   05/14/2020 2.30   04/29/2020 3.50   07/11/2019 2.34 (H)   07/10/2019 2.53 (H)     CLINICAL PHARMACY CONSULT: MED RECONCILIATION/REVIEW ADDENDUM    For Pharmacy Admin Tracking Only    PHSO: No  Total # of Interventions Recommended: 1  - Decreased Dose #: 1  - Maintenance Safety Lab Monitoring #: 1  Total Interventions Accepted: 1  Time Spent (min): 15    Clinton Hi PharmD

## 2020-12-30 ENCOUNTER — TELEPHONE (OUTPATIENT)
Dept: PHARMACY | Age: 70
End: 2020-12-30

## 2020-12-30 NOTE — TELEPHONE ENCOUNTER
Called patient about his missed appt. Patient states that Dr. Fei Zelaya is doing his INR now and managing his warfarin.

## 2020-12-31 ENCOUNTER — ANTI-COAG VISIT (OUTPATIENT)
Dept: PHARMACY | Age: 70
End: 2020-12-31

## 2020-12-31 NOTE — TELEPHONE ENCOUNTER
Called Dr. Jose Carlos Mistry office and confirmed with Joni Blanco that patient's INR is now being monitored by Dr. Jose Carlos Mistry office. Discharging patient from the [facility].

## 2021-02-03 NOTE — PROGRESS NOTES
[facility]  Advanced CHF/Pulmonary Hypertension   Cardiac Evaluation      Stephanie Rodriguez  YOB: 1950    Date of Visit:  2/5/21    Chief Complaint   Patient presents with    Follow-up    Hypertension    Coronary Artery Disease    Hyperlipidemia      History of Present Illness:  Addy Silverio is a 72 y.o. male with a past medical history of PHTN, non-obstructive CAD, HTN, HLD, LUZ MARINA, & chronic atrial fibrillation diagnosed in 2004 treated with sotalol & Coumadin. He had an acute GIB in March 2018 and was started on PPI. He now has increased RVSP from 41mmHg in August 2014 to 70mmHg in Sept 2019. He was started on spironolactone. He had his sleep apnea testing, and it was severely positive. He failed CPAP and was changed to Bipap. He had Covid Nov 2020 (symptos primarily loss of taste & smell, no dyspnea). Today, he is here for regular follow up and an ECHO. Overall, he feels fairly well. He developed post-Covid SOB for several weeks but now starting to feel better. He has lost some weight. He denies exertional chest pain, PND, palpitations, light-headedness, edema. He takes regular walks. He is having chest pain over the last few weeks. The pain is substernal, aching in nature, lasting for 5 minutes at a time.     NYHA Class II-III    Allergies   Allergen Reactions    No Known Allergies      Current Outpatient Medications   Medication Sig Dispense Refill    lisinopril-hydroCHLOROthiazide (PRINZIDE;ZESTORETIC) 20-12.5 MG per tablet Take 1 tablet by mouth daily 90 tablet 1    metoprolol tartrate (LOPRESSOR) 25 MG tablet Take 1 tablet by mouth 2 times daily 180 tablet 3    vitamin D (ERGOCALCIFEROL) 1.25 MG (86373 UT) CAPS capsule TAKE 1 CAPSULE BY MOUTH 1 TIME A WEEK 12 capsule 3    furosemide (LASIX) 40 MG tablet TAKE 1 TABLET BY MOUTH DAILY 30 tablet 11    CARTIA  MG extended release capsule TAKE 1 CAPSULE BY MOUTH DAILY 90 capsule 3    spironolactone (ALDACTONE) 25 MG tablet Take 1 tablet by mouth daily 90 tablet 3    warfarin (COUMADIN) 5 MG tablet One tab daily, as directed  On Saturday 7/13 and Sunday 7/14 take only 1/2 tabs. Get INR on Monday 7/15 (Patient taking differently: Managed by [facility]  One tab daily, as directed  On Saturday 7/13 and Sunday 7/14 take only 1/2 tabs. Get INR on Monday 7/15) 30 tablet 3    pantoprazole (PROTONIX) 40 MG tablet Take 1 tablet by mouth every morning (before breakfast) 90 tablet 0    metFORMIN (GLUCOPHAGE) 1000 MG tablet Take 1,000 mg by mouth 2 times daily (with meals).  zolpidem (AMBIEN) 10 MG tablet nightly as needed . 2    atorvastatin (LIPITOR) 10 MG tablet Take 1 tablet by mouth daily. 30 tablet 0    aspirin EC 81 MG EC tablet Take 1 tablet by mouth daily. 30 tablet 3     No current facility-administered medications for this visit.         Past Medical History:   Diagnosis Date    [facility]     CAD (coronary artery disease)     Cancer ([facility] [facility]     skin    Diabetes mellitus ([facility]     Hypercholesteremia     Hypertension     Obesity     Sleep apnea     Ulcer of pyloric antrum 2/2014    anemia/GI bleed while on NSAIDS     Past Surgical History:   Procedure Laterality Date    COLONOSCOPY      CYST REMOVAL      DIAGNOSTIC CARDIAC CATH LAB PROCEDURE  8/24/06    KNEE SURGERY      arthroscopy bilateral    UPPER GASTROINTESTINAL ENDOSCOPY  02/07/14    EGD/Colonoscopy with biopsy of ulcer    UPPER GASTROINTESTINAL ENDOSCOPY  03/19/2018    with bx     Family History   Problem Relation Age of Onset    Heart Disease Mother     Cancer Father     Cancer Sister         breast     Social History     Socioeconomic History    Marital status:      Spouse name: Not on file    Number of children: Not on file    Years of education: Not on file    Highest education level: Not on file   Occupational History    Not on file   Social Needs    Financial resource strain: Not on file   Roxy-[place]ren[place] insecurity     Worry: Not on file     Inability: Not on file    Transportation needs     Medical: Not on file     Non-medical: Not on file   Tobacco Use    Smoking status: Never Smoker    Smokeless tobacco: Never Used   Substance and Sexual Activity    Alcohol use: Yes     Alcohol/week: 1.0 standard drinks     Types: 1 Cans of beer per week     Comment: occas    Drug use: No    Sexual activity: Yes     Partners: Female   Lifestyle    Physical activity     Days per week: Not on file     Minutes per session: Not on file    Stress: Not on file   Relationships    Social connections     Talks on phone: Not on file     Gets together: Not on file     Attends Anabaptism service: Not on file     Active member of club or organization: Not on file     Attends meetings of clubs or organizations: Not on file     Relationship status: Not on file    Intimate partner violence     Fear of current or ex partner: Not on file     Emotionally abused: Not on file     Physically abused: Not on file     Forced sexual activity: Not on file   Other Topics Concern    Not on file   Social History Narrative    Not on file       Review of Systems:   · Constitutional: there has been no unanticipated weight loss. There's been no change in energy level, sleep pattern, or activity level. · Eyes: No visual changes or diplopia. No scleral icterus. · ENT: No Headaches, hearing loss or vertigo. No mouth sores or sore throat. · Cardiovascular: Reviewed in HPI  · Respiratory: No cough or wheezing, no sputum production. No hematemesis. · Gastrointestinal: No abdominal pain, appetite loss, blood in stools. No change in bowel or bladder habits. · Genitourinary: No dysuria, trouble voiding, or hematuria. · Musculoskeletal:  No gait disturbance, weakness or joint complaints. · Integumentary: No rash or pruritis. · Neurological: No headache, diplopia, change in muscle strength, numbness or tingling.  No change in gait, balance, coordination, mood, affect, memory, mentation, behavior. · Psychiatric: No anxiety, no depression. · Endocrine: No malaise, fatigue or temperature intolerance. No excessive thirst, fluid intake, or urination. No tremor. · Hematologic/Lymphatic: No abnormal bruising or bleeding, blood clots or swollen lymph nodes. · Allergic/Immunologic: No nasal congestion or hives. Physical Examination:    Vitals:    02/05/21 1413   BP: 108/60   Site: Right Upper Arm   Position: Sitting   Cuff Size: Large Adult   Pulse: 72   SpO2: 97%   Weight: 287 lb (130.2 kg)   Height: 5' 10\" (1.778 m)     Body mass index is 41.18 kg/m². Wt Readings from Last 3 Encounters:   02/05/21 287 lb (130.2 kg)   10/19/20 285 lb (129.3 kg)   10/05/20 293 lb (132.9 kg)     BP Readings from Last 3 Encounters:   02/05/21 108/60   09/30/20 110/70   06/29/20 110/66     Constitutional and General Appearance:   WD/WN in NAD, obese gentleman  HEENT:  NC/AT  ISIDRO  No problems with hearing  Skin:  Warm, dry  Respiratory:  · Normal excursion and expansion without use of accessory muscles  · Resp Auscultation: Normal breath sounds without dullness  Cardiovascular:  · The apical impulses not displaced  · Heart tones are crisp and normal  · Cervical veins are not engorged  · The carotid upstroke is normal in amplitude and contour without delay or bruit  · JVP less than 8 cm H2O  RRR with nl S1 and S2 without m,r,g  · Peripheral pulses are symmetrical and full  · There is no clubbing, cyanosis of the extremities.   · Trace bilateral edema  · Femoral Arteries: 2+ and equal  · Pedal Pulses: 2+ and equal   Neck:  · No thyromegaly  Abdomen: Obese  · No masses or tenderness  · Liver/Spleen: No Abnormalities Noted  Neurological/Psychiatric:  · Alert and oriented in all spheres  · Moves all extremities well  · Exhibits normal gait balance and coordination  · No abnormalities of mood, affect, memory, mentation, or behavior are noted    Diagnostic Testing:    ECHO cooperating in the care of this patient. Jina Healy M.D., [address] [place] [address] attestation: This note was scribed in the presence of Dr. Surjit Lewis MD, by Bettye Lainez RN. The scribe's documentation has been prepared under my direction and personally reviewed by me in its entirety. I confirm that the note above accurately reflects all work, treatment, procedures, and medical decision making performed by me.

## 2021-02-05 ENCOUNTER — HOSPITAL ENCOUNTER (OUTPATIENT)
Age: 71
Discharge: HOME | End: 2021-02-05
Payer: COMMERCIAL

## 2021-02-05 ENCOUNTER — HOSPITAL ENCOUNTER (OUTPATIENT)
Dept: NON INVASIVE DIAGNOSTICS | Age: 71
Discharge: HOME | End: 2021-02-05
Payer: COMMERCIAL

## 2021-02-05 ENCOUNTER — OFFICE VISIT (OUTPATIENT)
Dept: CARDIOLOGY CLINIC | Age: 71
End: 2021-02-05
Payer: COMMERCIAL

## 2021-02-05 VITALS
BODY MASS INDEX: 41.09 KG/M2 | OXYGEN SATURATION: 97 % | HEART RATE: 72 BPM | DIASTOLIC BLOOD PRESSURE: 60 MMHG | WEIGHT: 287 LBS | HEIGHT: 70 IN | SYSTOLIC BLOOD PRESSURE: 108 MMHG

## 2021-02-05 DIAGNOSIS — I10 ESSENTIAL HYPERTENSION: ICD-10-CM

## 2021-02-05 DIAGNOSIS — G47.33 OSA (OBSTRUCTIVE SLEEP APNEA): ICD-10-CM

## 2021-02-05 DIAGNOSIS — R06.02 SOB (SHORTNESS OF BREATH): ICD-10-CM

## 2021-02-05 DIAGNOSIS — I50.32 CHRONIC DIASTOLIC HEART FAILURE (HCC): ICD-10-CM

## 2021-02-05 DIAGNOSIS — E78.00 PURE HYPERCHOLESTEROLEMIA: ICD-10-CM

## 2021-02-05 DIAGNOSIS — I48.20 CHRONIC ATRIAL FIBRILLATION (HCC): ICD-10-CM

## 2021-02-05 DIAGNOSIS — E66.01 MORBID OBESITY WITH BMI OF 40.0-44.9, ADULT (HCC): ICD-10-CM

## 2021-02-05 DIAGNOSIS — I50.32 CHRONIC DIASTOLIC HEART FAILURE (HCC): Primary | ICD-10-CM

## 2021-02-05 LAB
ANION GAP SERPL CALCULATED.3IONS-SCNC: 10 MMOL/L (ref 3–16)
BASOPHILS ABSOLUTE: 0.1 K/UL (ref 0–0.2)
BASOPHILS RELATIVE PERCENT: 0.6 %
BUN BLDV-MCNC: 48 MG/DL (ref 7–20)
CALCIUM SERPL-MCNC: 9.6 MG/DL (ref 8.3–10.6)
CHLORIDE BLD-SCNC: 106 MMOL/L (ref 99–110)
CO2: 25 MMOL/L (ref 21–32)
CREAT SERPL-MCNC: 1.9 MG/DL (ref 0.8–1.3)
EOSINOPHILS ABSOLUTE: 0.3 K/UL (ref 0–0.6)
EOSINOPHILS RELATIVE PERCENT: 2.9 %
GFR AFRICAN AMERICAN: 43
GFR NON-AFRICAN AMERICAN: 35
GLUCOSE BLD-MCNC: 132 MG/DL (ref 70–99)
HCT VFR BLD CALC: 39.5 % (ref 40.5–52.5)
HEMOGLOBIN: 12.6 G/DL (ref 13.5–17.5)
IRON SATURATION: 17 % (ref 20–50)
IRON: 65 UG/DL (ref 59–158)
LV EF: 60 %
LVEF MODALITY: NORMAL
LYMPHOCYTES ABSOLUTE: 1.8 K/UL (ref 1–5.1)
LYMPHOCYTES RELATIVE PERCENT: 18.5 %
MCH RBC QN AUTO: 27.9 PG (ref 26–34)
MCHC RBC AUTO-ENTMCNC: 31.9 G/DL (ref 31–36)
MCV RBC AUTO: 87.6 FL (ref 80–100)
MONOCYTES ABSOLUTE: 0.9 K/UL (ref 0–1.3)
MONOCYTES RELATIVE PERCENT: 9.4 %
NEUTROPHILS ABSOLUTE: 6.6 K/UL (ref 1.7–7.7)
NEUTROPHILS RELATIVE PERCENT: 68.6 %
PDW BLD-RTO: 15.2 % (ref 12.4–15.4)
PLATELET # BLD: 271 K/UL (ref 135–450)
PMV BLD AUTO: 9.5 FL (ref 5–10.5)
POTASSIUM SERPL-SCNC: 4.9 MMOL/L (ref 3.5–5.1)
PRO-BNP: 2377 PG/ML (ref 0–124)
RBC # BLD: 4.51 M/UL (ref 4.2–5.9)
SODIUM BLD-SCNC: 141 MMOL/L (ref 136–145)
TOTAL IRON BINDING CAPACITY: 391 UG/DL (ref 260–445)
WBC # BLD: 9.7 K/UL (ref 4–11)

## 2021-02-05 PROCEDURE — 36415 COLL VENOUS BLD VENIPUNCTURE: CPT

## 2021-02-05 PROCEDURE — 83550 IRON BINDING TEST: CPT

## 2021-02-05 PROCEDURE — 83540 ASSAY OF IRON: CPT

## 2021-02-05 PROCEDURE — 99214 OFFICE O/P EST MOD 30 MIN: CPT | Performed by: INTERNAL MEDICINE

## 2021-02-05 PROCEDURE — 93306 TTE W/DOPPLER COMPLETE: CPT

## 2021-02-05 PROCEDURE — 80048 BASIC METABOLIC PNL TOTAL CA: CPT

## 2021-02-05 PROCEDURE — 85025 COMPLETE CBC W/AUTO DIFF WBC: CPT

## 2021-02-05 PROCEDURE — 83880 ASSAY OF NATRIURETIC PEPTIDE: CPT

## 2021-02-05 RX ORDER — FUROSEMIDE 20 MG/1
20 TABLET ORAL DAILY
Qty: 90 TABLET | Refills: 3 | Status: SHIPPED | OUTPATIENT
Start: 2021-02-05 | End: 2022-03-23

## 2021-02-11 ENCOUNTER — HOSPITAL ENCOUNTER (OUTPATIENT)
Dept: NON INVASIVE DIAGNOSTICS | Age: 71
Discharge: HOME | End: 2021-02-11
Payer: COMMERCIAL

## 2021-02-11 DIAGNOSIS — R06.02 SOB (SHORTNESS OF BREATH): ICD-10-CM

## 2021-02-11 DIAGNOSIS — I50.32 CHRONIC DIASTOLIC HEART FAILURE (HCC): ICD-10-CM

## 2021-02-11 LAB
LV EF: 73 %
LVEF MODALITY: NORMAL

## 2021-02-11 PROCEDURE — 93017 CV STRESS TEST TRACING ONLY: CPT | Performed by: INTERNAL MEDICINE

## 2021-02-11 PROCEDURE — 3430000000 HC RX DIAGNOSTIC RADIOPHARMACEUTICAL: Performed by: INTERNAL MEDICINE

## 2021-02-11 PROCEDURE — 6360000002 HC RX W HCPCS: Performed by: INTERNAL MEDICINE

## 2021-02-11 PROCEDURE — A9502 TC99M TETROFOSMIN: HCPCS | Performed by: INTERNAL MEDICINE

## 2021-02-11 PROCEDURE — 78452 HT MUSCLE IMAGE SPECT MULT: CPT

## 2021-02-11 RX ADMIN — REGADENOSON 0.4 MG: 0.08 INJECTION, SOLUTION INTRAVENOUS at 08:13

## 2021-02-11 RX ADMIN — TETROFOSMIN 10 MILLICURIE: 1.38 INJECTION, POWDER, LYOPHILIZED, FOR SOLUTION INTRAVENOUS at 07:19

## 2021-02-11 RX ADMIN — TETROFOSMIN 30 MILLICURIE: 1.38 INJECTION, POWDER, LYOPHILIZED, FOR SOLUTION INTRAVENOUS at 09:02

## 2021-02-11 NOTE — PROGRESS NOTES
Instructed on Lexiscan Stress Test Procedure including possible side effects/ adverse reactions. Patient verbalizes  understanding and denies having any questions. See [address] [place] [address] Cardiology.   Shyla Meredith RN

## 2021-02-23 RX ORDER — LISINOPRIL AND HYDROCHLOROTHIAZIDE 20; 12.5 MG/1; MG/1
1 TABLET ORAL DAILY
Qty: 90 TABLET | Refills: 1 | Status: SHIPPED | OUTPATIENT
Start: 2021-02-23 | End: 2021-09-07

## 2021-02-23 NOTE — TELEPHONE ENCOUNTER
Received refill request for lisinopril-hctz from [facility] pharmacy.     Last ov:2/5/2021 HARRY    Last labs:bmp 2/5/2021    Last Refill: 8/28/2020 #90 with 1 refill    Next appointment:6/16/2021 HARRY

## 2021-03-01 ENCOUNTER — IMMUNIZATION (OUTPATIENT)
Dept: PRIMARY CARE CLINIC | Age: 71
End: 2021-03-01
Payer: COMMERCIAL

## 2021-03-01 ENCOUNTER — OFFICE VISIT (OUTPATIENT)
Dept: ORTHOPEDIC SURGERY | Age: 71
End: 2021-03-01
Payer: COMMERCIAL

## 2021-03-01 VITALS — TEMPERATURE: 97.2 F | HEIGHT: 70 IN | BODY MASS INDEX: 41.09 KG/M2 | WEIGHT: 287 LBS

## 2021-03-01 DIAGNOSIS — G56.01 CARPAL TUNNEL SYNDROME OF RIGHT WRIST: Primary | ICD-10-CM

## 2021-03-01 DIAGNOSIS — G56.02 CARPAL TUNNEL SYNDROME OF LEFT WRIST: ICD-10-CM

## 2021-03-01 PROCEDURE — 20605 DRAIN/INJ JOINT/BURSA W/O US: CPT | Performed by: ORTHOPAEDIC SURGERY

## 2021-03-01 PROCEDURE — L3908 WHO COCK-UP NONMOLDE PRE OTS: HCPCS | Performed by: ORTHOPAEDIC SURGERY

## 2021-03-01 PROCEDURE — 0011A PR IMM ADMN SARSCOV2 100 MCG/0.5 ML 1ST DOSE: CPT

## 2021-03-01 PROCEDURE — 91301 COVID-19, MODERNA VACCINE 100MCG/0.5ML DOSE: CPT

## 2021-03-01 PROCEDURE — 99213 OFFICE O/P EST LOW 20 MIN: CPT | Performed by: ORTHOPAEDIC SURGERY

## 2021-03-01 RX ORDER — METHYLPREDNISOLONE ACETATE 40 MG/ML
40 INJECTION, SUSPENSION INTRA-ARTICULAR; INTRALESIONAL; INTRAMUSCULAR; SOFT TISSUE ONCE
Status: COMPLETED | OUTPATIENT
Start: 2021-03-01 | End: 2021-03-01

## 2021-03-01 RX ORDER — ROPIVACAINE HYDROCHLORIDE 5 MG/ML
30 INJECTION, SOLUTION EPIDURAL; INFILTRATION; PERINEURAL ONCE
Status: DISCONTINUED | OUTPATIENT
Start: 2021-03-01 | End: 2021-03-01

## 2021-03-01 RX ADMIN — METHYLPREDNISOLONE ACETATE 40 MG: 40 INJECTION, SUSPENSION INTRA-ARTICULAR; INTRALESIONAL; INTRAMUSCULAR; SOFT TISSUE at 10:43

## 2021-03-01 RX ADMIN — METHYLPREDNISOLONE ACETATE 40 MG: 40 INJECTION, SUSPENSION INTRA-ARTICULAR; INTRALESIONAL; INTRAMUSCULAR; SOFT TISSUE at 10:42

## 2021-03-01 NOTE — PROGRESS NOTES
Procedures    Alicia Verdugo Titan Wrist Short Brace     Patient was prescribed a Alicia Verdugo Titan Wrist Orthosis. The left wrist will require stabilization / immobilization from this semi-rigid / rigid orthosis to improve their function. The orthosis will assist in protecting the affected area, provide functional support and facilitate healing. The patient was educated and fit by a healthcare professional with expert knowledge and specialization in brace application while under the direct supervision of the treating physician. Verbal and written instructions for the use of and application of this item were provided. They were instructed to contact the office immediately should the brace result in increased pain, decreased sensation, increased swelling or worsening of the condition.

## 2021-03-01 NOTE — PROGRESS NOTES
Administrations This Visit     methylPREDNISolone acetate (DEPO-MEDROL) injection 40 mg     Admin Date  03/01/2021  10:42 Action  Given Dose  40 mg Route  Intramuscular Site  Other Administered By  Chago Dasilva    Ordering Provider: Chance Cortes MD    NDC: 4112-1966-82    Lot#: [id number]    : [address]. Patient Supplied?: No    Comments: Lt CTS           Admin Date  03/01/2021  10:43 Action  Given Dose  40 mg Route  Intramuscular Site  Other Administered By  Chago Dasilva    Ordering Provider: Chance Cortes MD    NDC: 1915-6483-38    Lot#: [id number]    : [address].     Patient Supplied?: No    Comments: Rt CTS

## 2021-03-01 NOTE — PROGRESS NOTES
[address] [facility]  Office Visit  Follow up  Date:  3/1/2021    Name:  Eduarda Peacock  Address:  [facility] [address]    :  1950      Age:   72 y.o.    SSN:  [id number]      Medical Record Number:  [id number]    Chief Complaint:    Follow up for his bilateral wrists    HPI:   Eduarda Peacock is a 72 y.o. male who is following up regarding numbness in the bilateral hands. He was previously seen in 2020 at which time his right carpal tunnel was injected with corticosteroid. He states he had great relief of his pain and numbness in his right hand up until 2 to 3 weeks ago. He feels the numbness mostly at night and as well as with certain hand positions. He is also having numbness in the middle and index finger of the left hand in similar circumstances. He denies any new numbness, tingling, fevers, chills, chest pain, shortness of breath, or any other new significant symptoms. Past History:  Past Medical History:   Diagnosis Date    Atrial fibrillation ([facility] [address]     CAD (coronary artery disease)     Cancer ([facility] [address] [facility]     skin    Diabetes mellitus ([facility] [address] [facility]     Hypercholesteremia     Hypertension     Obesity     Sleep apnea     Ulcer of pyloric antrum 2014    anemia/GI bleed while on NSAIDS       Past Surgical History:   Procedure Laterality Date    COLONOSCOPY      CYST REMOVAL      DIAGNOSTIC CARDIAC CATH LAB PROCEDURE  06    KNEE SURGERY      arthroscopy bilateral    UPPER GASTROINTESTINAL ENDOSCOPY  14    EGD/Colonoscopy with biopsy of ulcer    UPPER GASTROINTESTINAL ENDOSCOPY  2018    with bx       Social History     Tobacco Use    Smoking status: Never Smoker    Smokeless tobacco: Never Used   Substance Use Topics    Alcohol use:  Yes     Alcohol/week: 1.0 standard drinks     Types: 1 Cans of beer per week     Comment: occas    Drug use: No        Family History:  family history includes Cancer in his father The left wrist will require stabilization / immobilization from this semi-rigid / rigid orthosis to improve their function. The orthosis will assist in protecting the affected area, provide functional support and facilitate healing. The patient was educated and fit by a healthcare professional with expert knowledge and specialization in brace application while under the direct supervision of the treating physician. Verbal and written instructions for the use of and application of this item were provided. They were instructed to contact the office immediately should the brace result in increased pain, decreased sensation, increased swelling or worsening of the condition. Physical Exam:  Temp 97.2 °F (36.2 °C)   Ht 5' 10\" (1.778 m)   Wt 287 lb (130.2 kg)   BMI 41.18 kg/m²         General: No acute distress, well nourished  CV: No obvious peripheral edema. Normal peripheral pulses  Neuro: Alert & oriented x 3  Psych: Normal mood and affect    Bilateral hands exam    Hands are warm and well-perfused, brisk cap refill distally  Sensation intact light touch distally in the radial, ulnar, medial nerve distribution  Negative Tinel's, negative compression, negative flexion  No appreciable atrophy noted  Motor strength intact      Radiographic:  No new imaging    Assessment:  Elma Hi is a 72 y.o. male with:  1. Bilateral carpal tunnel syndrome    Impression:  Encounter Diagnoses   Name Primary?  Carpal tunnel syndrome of right wrist Yes    Carpal tunnel syndrome of left wrist        Office Procedures:  Orders Placed This Encounter   Procedures    Alicia Verdugo Titan Wrist Short Brace     Patient was prescribed a Alicia Verdugo Titan Wrist Orthosis. The left wrist will require stabilization / immobilization from this semi-rigid / rigid orthosis to improve their function. The orthosis will assist in protecting the affected area, provide functional support and facilitate healing.     The patient was educated and fit by a healthcare professional with expert knowledge and specialization in brace application while under the direct supervision of the treating physician. Verbal and written instructions for the use of and application of this item were provided. They were instructed to contact the office immediately should the brace result in increased pain, decreased sensation, increased swelling or worsening of the condition. Plan:   Pertinent imaging was reviewed. The etiology, natural history, and treatment options for the disorder were discussed. The roles of activity modifications, medications, injections, physical therapy, and surgical interventions were all described to the patient and questions were answered. Patient does have carpal tunnel syndrome in the bilateral hands. He has benefited from steroid injection on the right in the past he would like to obtain bilateral steroid injections today, these were provided. See procedure note. Also discussed the possibility of carpal tunnel release in the future patient would like to hold off. Did provide him with a wrist splint for the left today in the office. Follow-up as needed for the bilateral carpal tunnel syndrome. Procedure noteRight carpal tunnel steroid injection    A solution containing 40 mg of Depo-Medrol was injected volarly into the carpal tunnel. Needle was withdrawn and Band-Aid was applied. Patient tolerated procedure well. Procedure noteLeft carpal tunnel steroid injection    A solution containing 40 mg of Depo-Medrol was injected volarly into the carpal tunnel. Needle was withdrawn and Band-Aid was applied. Patient tolerated procedure well. Scotty Cain Fellow    The encounter with Sarita Gillis was supervised by Dr Edgardo Alan who personally examined the patient and reviewed the plan. This dictation was performed with a verbal recognition program (DRAGON) and it was checked for errors.   It is possible that there are still dictated errors within this office note. If so, please bring any errors to my attention for an addendum. All efforts were made to ensure that this office note is accurate. Attestation:  I was physically present and performed my own examination of this patient and have discussed the case, including pertinent history and exam findings with the fellow. I agree with the documented assessment and plan. Pita Kaplan.  Missy Sheffield MD

## 2021-03-08 ENCOUNTER — OFFICE VISIT (OUTPATIENT)
Dept: ORTHOPEDIC SURGERY | Age: 71
End: 2021-03-08
Payer: COMMERCIAL

## 2021-03-08 VITALS — BODY MASS INDEX: 41.09 KG/M2 | TEMPERATURE: 97.3 F | HEIGHT: 70 IN | WEIGHT: 287 LBS

## 2021-03-08 DIAGNOSIS — M17.11 PRIMARY OSTEOARTHRITIS OF RIGHT KNEE: Primary | ICD-10-CM

## 2021-03-08 DIAGNOSIS — M17.12 PRIMARY OSTEOARTHRITIS OF LEFT KNEE: ICD-10-CM

## 2021-03-08 PROCEDURE — 20610 DRAIN/INJ JOINT/BURSA W/O US: CPT | Performed by: ORTHOPAEDIC SURGERY

## 2021-03-08 PROCEDURE — 99213 OFFICE O/P EST LOW 20 MIN: CPT | Performed by: ORTHOPAEDIC SURGERY

## 2021-03-08 RX ORDER — ROPIVACAINE HYDROCHLORIDE 5 MG/ML
30 INJECTION, SOLUTION EPIDURAL; INFILTRATION; PERINEURAL ONCE
Status: COMPLETED | OUTPATIENT
Start: 2021-03-08 | End: 2021-03-08

## 2021-03-08 RX ORDER — METHYLPREDNISOLONE ACETATE 40 MG/ML
40 INJECTION, SUSPENSION INTRA-ARTICULAR; INTRALESIONAL; INTRAMUSCULAR; SOFT TISSUE ONCE
Status: COMPLETED | OUTPATIENT
Start: 2021-03-08 | End: 2021-03-08

## 2021-03-08 RX ADMIN — METHYLPREDNISOLONE ACETATE 40 MG: 40 INJECTION, SUSPENSION INTRA-ARTICULAR; INTRALESIONAL; INTRAMUSCULAR; SOFT TISSUE at 09:52

## 2021-03-08 RX ADMIN — ROPIVACAINE HYDROCHLORIDE 30 ML: 5 INJECTION, SOLUTION EPIDURAL; INFILTRATION; PERINEURAL at 09:52

## 2021-03-08 RX ADMIN — ROPIVACAINE HYDROCHLORIDE 30 ML: 5 INJECTION, SOLUTION EPIDURAL; INFILTRATION; PERINEURAL at 09:53

## 2021-03-16 RX ORDER — DILTIAZEM HYDROCHLORIDE 240 MG/1
CAPSULE, COATED, EXTENDED RELEASE ORAL
Qty: 90 CAPSULE | Refills: 3 | Status: SHIPPED | OUTPATIENT
Start: 2021-03-16 | End: 2022-03-23

## 2021-03-18 RX ORDER — SPIRONOLACTONE 25 MG/1
25 TABLET ORAL DAILY
Qty: 90 TABLET | Refills: 3 | Status: SHIPPED | OUTPATIENT
Start: 2021-03-18 | End: 2022-03-23

## 2021-03-29 ENCOUNTER — IMMUNIZATION (OUTPATIENT)
Dept: PRIMARY CARE CLINIC | Age: 71
End: 2021-03-29
Payer: COMMERCIAL

## 2021-03-29 PROCEDURE — 0012A COVID-19, MODERNA VACCINE 100MCG/0.5ML DOSE: CPT | Performed by: FAMILY MEDICINE

## 2021-03-29 PROCEDURE — 91301 COVID-19, MODERNA VACCINE 100MCG/0.5ML DOSE: CPT | Performed by: FAMILY MEDICINE

## 2021-06-04 NOTE — PROGRESS NOTES
[facility]  Advanced CHF/Pulmonary Hypertension   Cardiac Evaluation      Aubrey Rodriguez  YOB: 1950    Date of Visit:  6/4/21    No chief complaint on file. History of Present Illness:  Francee Goldberg is a 80 y.o. male with a past medical history of PHTN, non-obstructive CAD, HTN, HLD, LUZ MARINA, & chronic atrial fibrillation diagnosed in 2004 treated with sotalol & Coumadin. He had an acute GIB in March 2018 and was started on PPI. He now has increased RVSP from 41mmHg in August 2014 to 70mmHg in Sept 2019. He was started on spironolactone. He had his sleep apnea testing, and it was severely positive. He failed CPAP and was changed to Bipap. He had Covid Nov 2020 (symptos primarily loss of taste & smell, no dyspnea). At last visit 2/5/1`, he c/i chest pain. Subsequent nuclear stress test 2/11/21 was normal.    Today, he     NYHA Class II-III    Allergies   Allergen Reactions    No Known Allergies      Current Outpatient Medications   Medication Sig Dispense Refill    vitamin D3 (CHOLECALCIFEROL) 25 MCG (1000 UT) TABS tablet Take 1 tablet by mouth daily 90 tablet 0    spironolactone (ALDACTONE) 25 MG tablet TAKE 1 TABLET BY MOUTH DAILY 90 tablet 3    dilTIAZem (CARDIZEM CD) 240 MG extended release capsule TAKE 1 CAPSULE BY MOUTH DAILY 90 capsule 3    lisinopril-hydroCHLOROthiazide (PRINZIDE;ZESTORETIC) 20-12.5 MG per tablet TAKE 1 TABLET BY MOUTH DAILY 90 tablet 1    furosemide (LASIX) 20 MG tablet Take 1 tablet by mouth daily Or as directed 90 tablet 3    metoprolol tartrate (LOPRESSOR) 25 MG tablet Take 1 tablet by mouth 2 times daily 180 tablet 3    warfarin (COUMADIN) 5 MG tablet One tab daily, as directed  On Saturday 7/13 and Sunday 7/14 take only 1/2 tabs. Get INR on Monday 7/15 (Patient taking differently: Managed by [facility]  One tab daily, as directed  On Saturday 7/13 and Sunday 7/14 take only 1/2 tabs.   Get INR on Monday 7/15) 30 tablet 3    pantoprazole (PROTONIX) 40 MG tablet Take 1 tablet by mouth every morning (before breakfast) 90 tablet 0    metFORMIN (GLUCOPHAGE) 1000 MG tablet Take 1,000 mg by mouth 2 times daily (with meals).  zolpidem (AMBIEN) 10 MG tablet nightly as needed . 2    atorvastatin (LIPITOR) 10 MG tablet Take 1 tablet by mouth daily. 30 tablet 0    aspirin EC 81 MG EC tablet Take 1 tablet by mouth daily. 30 tablet 3     No current facility-administered medications for this visit. Past Medical History:   Diagnosis Date    Atrial fibrillation ([facility] [address]     CAD (coronary artery disease)     Cancer ([facility] [address]     skin    [facility]     Hypercholesteremia     Hypertension     Obesity     Sleep apnea     Ulcer of pyloric antrum 2/2014    anemia/GI bleed while on NSAIDS     Past Surgical History:   Procedure Laterality Date    COLONOSCOPY      CYST REMOVAL      DIAGNOSTIC CARDIAC CATH LAB PROCEDURE  8/24/06    KNEE SURGERY      arthroscopy bilateral    UPPER GASTROINTESTINAL ENDOSCOPY  02/07/14    EGD/Colonoscopy with biopsy of ulcer    UPPER GASTROINTESTINAL ENDOSCOPY  03/19/2018    with bx     Family History   Problem Relation Age of Onset    Heart Disease Mother     Cancer Father     Cancer Sister         breast     Social History     Socioeconomic History    Marital status:      Spouse name: Not on file    Number of children: Not on file    Years of education: Not on file    Highest education level: Not on file   Occupational History    Not on file   Tobacco Use    Smoking status: Never Smoker    Smokeless tobacco: Never Used   Vaping Use    Vaping Use: Never used   Substance and Sexual Activity    Alcohol use:  Yes     Alcohol/week: 1.0 standard drinks     Types: 1 Cans of beer per week     Comment: occas    Drug use: No    Sexual activity: Yes     Partners: Female   Other Topics Concern    Not on file   Social History Narrative    Not on file     Social Determinants of Health Financial Resource Strain:     Difficulty of Paying Living Expenses:    Food Insecurity:     Worried About Running Out of Food in the Last Year:     [address] in the Last Year:    Transportation Needs:     Lack of Transportation (Medical):  Lack of Transportation (Non-Medical):    Physical Activity:     Days of Exercise per Week:     Minutes of Exercise per Session:    Stress:     Feeling of Stress :    Social Connections:     Frequency of Communication with Friends and Family:     Frequency of Social Gatherings with Friends and Family:     Attends Yazidi Services:     Active Member of Clubs or Organizations:     Attends Club or Organization Meetings:     Marital Status:    Intimate Partner Violence:     Fear of Current or Ex-Partner:     Emotionally Abused:     Physically Abused:     Sexually Abused:        Review of Systems:   · Constitutional: there has been no unanticipated weight loss. There's been no change in energy level, sleep pattern, or activity level. · Eyes: No visual changes or diplopia. No scleral icterus. · ENT: No Headaches, hearing loss or vertigo. No mouth sores or sore throat. · Cardiovascular: Reviewed in HPI  · Respiratory: No cough or wheezing, no sputum production. No hematemesis. · Gastrointestinal: No abdominal pain, appetite loss, blood in stools. No change in bowel or bladder habits. · Genitourinary: No dysuria, trouble voiding, or hematuria. · Musculoskeletal:  No gait disturbance, weakness or joint complaints. · Integumentary: No rash or pruritis. · Neurological: No headache, diplopia, change in muscle strength, numbness or tingling. No change in gait, balance, coordination, mood, affect, memory, mentation, behavior. · Psychiatric: No anxiety, no depression. · Endocrine: No malaise, fatigue or temperature intolerance. No excessive thirst, fluid intake, or urination. No tremor.   · Hematologic/Lymphatic: No abnormal bruising or bleeding, ejection fraction of 60%. Indeterminate diastolic function. -Mild mitral regurgitation.   -The left atrium is dilated. -Mild tricuspid regurgitation with PASP of 53 mmHg. This is suggestive of moderate pulmonary hypertension. The right ventricle is mildly dilated. The right atrial size is dilated. Echo 9/25/19   Normal left ventricle size and systolic function with an estimated ejection   fraction of 60%. No regional wall motion abnormalities are seen.  Ephriam Hanna is mild concentric left ventricular hypertrophy.   E/e\"= 14. Indeterminate diastolic function. A-fib   The left atrium is mildly dilated.   The right ventricle is mildly enlarged and decreased in function.   Mild tricuspid regurgitation. PASP 70mmHg. PHTN   The right atrium is moderately dilated.  IVC size is dilated (>2.1 cm) but collapses > 50% with respiration   consistent with elevated RA pressure (8 mmHg).    11/13/19    TC  124, HDL 57, LDL 54, TG 66.    Hgb 12.1, Hct 37.5  BUN 24, Creat 1.0, K+ 4.5 (on spironolactone 12.5mg)  A1C 6.2 down from 6.7 in April 2019  TSH 1.84    Echo 8/29/14  Normal left ventricle size and systolic function with an estimated ejection   fraction of 55%. No regional wall motion abnormalities are seen.   -There is mild concentric left ventricular hypertrophy.   -No A wave. E/e\"= 12.   -Trace of mitral regurgitation.   -Biatrial enlargement.   -Mild tricuspid regurgitation. RVSP 41mmHg.     [facility] 8/[facility]  Dr. Tao Cuevas   Normal coronaries. Non obstructive CAD. Normal LV function. Labs were reviewed including labs from other hospital systems through [facility]. Cardiac testing was reviewed including echos, nuclear scans, cardiac catheterization, including from other hospital systems through [facility]. Assessment:    No diagnosis found. 1. Chronic diastolic heart failure:  Stable.  Compensated by exam.  No SOB or edema at present.   -Having SOB since Covid Nov 2020, but is starting to feel better, takes walks. Down 6# since Sept 2020.  -He takes spironolactone 25mg  -He takes Lasix 20mg qd  -ProBNP> 1509 (10/30/2019)> 1846 (3/4/2020)> 2018 (6/29/2020)> 2296 (9/30/2020)  -Echo 9/2019 shows mild concentric left ventricular hypertrophy. EF 60%. Pulmonary hypertension:  Stable  -Continue Spironolactone, Lisinopril/HCTZ. -ECHO today 2/5/21> PASP 53mmHg -- improved  -ECHO 9/25/19> RVSP 70mmHg. 2. Chronic atrial fibrillation: HR irregular & controlled. -Remains on Coumadin. Followed by Dr. Tai Marshall on Coumadin therapy. Managed by [facility]. Hx GI bleed. Now off all ibuprofen. H/H stable. Discussed Watchman but bleeding issues. 3. Pure hypercholesterolemia:  Continue Lipitor 10mg daily. 12/17/2020> , , HDL 36, LDL 73      4. Morbid obesity with BMI of 40.0-44.9, adult: Advised weight loss measures. 5. LUZ MARINA (obstructive sleep apnea):  Positive for LUZ MARINA. Continue Bipap therapy. H&H 10.9/33.5 from 12/17/2020. Plan:    1. Time Based Itemization  A total of 30 minutes was spent on today's patient encounter. If applicable, non-patient-facing activities:  ( x)Preparing to see the patient and reviewing records  ( ) Individual interpretation of results  ( ) Discussion or coordination of care with other health care professionals  ( x) Ordering of unique tests, medications, or procedures  ( x) Documentation within the EHR      I appreciate the opportunity of cooperating in the care of this patient.     Sonja Barrios M.D., [facility]

## 2021-06-15 NOTE — PROGRESS NOTES
[facility]  Advanced CHF/Pulmonary Hypertension   Cardiac Evaluation      Cedrick Rodriguez  YOB: 1950    Date of Visit:  6/16/21    Chief Complaint   Patient presents with    Follow-up    Congestive Heart Failure    Shortness of Breath      History of Present Illness:  Andreas Granger is a 80 y.o. male with a past medical history of PHTN, non-obstructive CAD, HTN, HLD, LUZ MARINA, & chronic atrial fibrillation diagnosed in 2004 treated with sotalol & Coumadin. He had an acute GIB in March 2018 and was started on PPI. He now has increased RVSP from 41mmHg in August 2014 to 70mmHg in Sept 2019. He was started on spironolactone. He had his sleep apnea testing, and it was severely positive. He failed CPAP and was changed to Bipap. He had Covid Nov 2020 (symptos primarily loss of taste & smell, no dyspnea). At last visit 2/5/1`, he c/i chest pain. Subsequent nuclear stress test 2/11/21 was normal.    Today, he states he has residual with COVID from November. He still has an effect of loss of smell and a different taste. States his appetite has changed \"big time. \"  He is less SOB than he was and still works on breathing. He did blood work and I have reviewed it. Will Add BNP. He responds very well to Lasix which he takes every other day. He is not sure of the dose.         NYHA Class II-III    Allergies   Allergen Reactions    No Known Allergies      Current Outpatient Medications   Medication Sig Dispense Refill    vitamin D3 (CHOLECALCIFEROL) 25 MCG (1000 UT) TABS tablet Take 1 tablet by mouth daily 90 tablet 0    spironolactone (ALDACTONE) 25 MG tablet TAKE 1 TABLET BY MOUTH DAILY 90 tablet 3    dilTIAZem (CARDIZEM CD) 240 MG extended release capsule TAKE 1 CAPSULE BY MOUTH DAILY 90 capsule 3    lisinopril-hydroCHLOROthiazide (PRINZIDE;ZESTORETIC) 20-12.5 MG per tablet TAKE 1 TABLET BY MOUTH DAILY 90 tablet 1    furosemide (LASIX) 20 MG tablet Take 1 tablet by mouth daily Or as directed 90 tablet 3    metoprolol tartrate (LOPRESSOR) 25 MG tablet Take 1 tablet by mouth 2 times daily 180 tablet 3    warfarin (COUMADIN) 5 MG tablet One tab daily, as directed  On Saturday 7/13 and Sunday 7/14 take only 1/2 tabs. Get INR on Monday 7/15 (Patient taking differently: Managed by [facility]  One tab daily, as directed  On Saturday 7/13 and Sunday 7/14 take only 1/2 tabs. Get INR on Monday 7/15) 30 tablet 3    pantoprazole (PROTONIX) 40 MG tablet Take 1 tablet by mouth every morning (before breakfast) 90 tablet 0    metFORMIN (GLUCOPHAGE) 1000 MG tablet Take 1,000 mg by mouth 2 times daily (with meals).  zolpidem (AMBIEN) 10 MG tablet nightly as needed . 2    atorvastatin (LIPITOR) 10 MG tablet Take 1 tablet by mouth daily. 30 tablet 0    aspirin EC 81 MG EC tablet Take 1 tablet by mouth daily. 30 tablet 3     No current facility-administered medications for this visit.        Past Medical History:   Diagnosis Date    [facility]     CAD (coronary artery disease)     Cancer ([facility]     skin    Diabetes mellitus ([facility]     Hypercholesteremia     Hypertension     Obesity     Sleep apnea     Ulcer of pyloric antrum 2/2014    anemia/GI bleed while on NSAIDS     Past Surgical History:   Procedure Laterality Date    COLONOSCOPY      CYST REMOVAL      DIAGNOSTIC CARDIAC CATH LAB PROCEDURE  8/24/06    KNEE SURGERY      arthroscopy bilateral    UPPER GASTROINTESTINAL ENDOSCOPY  02/07/14    EGD/Colonoscopy with biopsy of ulcer    UPPER GASTROINTESTINAL ENDOSCOPY  03/19/2018    with bx     Family History   Problem Relation Age of Onset    Heart Disease Mother     Cancer Father     Cancer Sister         breast     Social History     Socioeconomic History    Marital status:      Spouse name: Not on file    Number of children: Not on file    Years of education: Not on file    Highest education level: Not on file   Occupational History    Not on file Tobacco Use    Smoking status: Never Smoker    Smokeless tobacco: Never Used   Vaping Use    Vaping Use: Never used   Substance and Sexual Activity    Alcohol use: Yes     Alcohol/week: 1.0 standard drinks     Types: 1 Cans of beer per week     Comment: occas    Drug use: No    Sexual activity: Yes     Partners: Female   Other Topics Concern    Not on file   Social History Narrative    Not on file     Social Determinants of Health     Financial Resource Strain:     Difficulty of Paying Living Expenses:    Food Insecurity:     Worried About Running Out of Food in the Last Year:     [address] in the Last Year:    Transportation Needs:     Lack of Transportation (Medical):  Lack of Transportation (Non-Medical):    Physical Activity:     Days of Exercise per Week:     Minutes of Exercise per Session:    Stress:     Feeling of Stress :    Social Connections:     Frequency of Communication with Friends and Family:     Frequency of Social Gatherings with Friends and Family:     Attends Ep[employer]op[employer] Services:     Active Member of Clubs or Organizations:     Attends Club or Organization Meetings:     Marital Status:    Intimate Partner Violence:     Fear of Current or Ex-Partner:     Emotionally Abused:     Physically Abused:     Sexually Abused:        Review of Systems:   · Constitutional: there has been no unanticipated weight loss. There's been no change in energy level, sleep pattern, or activity level. · Eyes: No visual changes or diplopia. No scleral icterus. · ENT: No Headaches, hearing loss or vertigo. No mouth sores or sore throat. · Cardiovascular: Reviewed in HPI  · Respiratory: No cough or wheezing, no sputum production. No hematemesis. · Gastrointestinal: No abdominal pain, appetite loss, blood in stools. No change in bowel or bladder habits. · Genitourinary: No dysuria, trouble voiding, or hematuria.   · Musculoskeletal:  No gait disturbance, weakness or joint well  · Exhibits normal gait balance and coordination  · No abnormalities of mood, affect, memory, mentation, or behavior are noted    Diagnostic Testing:    Lexiscan dorene 2/11/21 (for cp)  Summary    Normal LV size and systolic function.    Normal myocardial perfusion study.    Mild apical thinning which is clinically nonsignificant       Stress Protocols        Resting ECG    Normal sinus rhythm. Normal ECG.      Resting HR:53 bpm        Resting BP:113/50 mmHg     ECHO 2/5/2021  -Normal left ventricle size, wall thickness, and systolic function with an estimated ejection fraction of 60%. Indeterminate diastolic function. -Mild mitral regurgitation.   -The left atrium is dilated. -Mild tricuspid regurgitation with PASP of 53 mmHg. This is suggestive of moderate pulmonary hypertension. The right ventricle is mildly dilated. The right atrial size is dilated. Echo 9/25/19   Normal left ventricle size and systolic function with an estimated ejection   fraction of 60%. No regional wall motion abnormalities are seen.  Rickford Math is mild concentric left ventricular hypertrophy.   E/e\"= 14. Indeterminate diastolic function. A-fib   The left atrium is mildly dilated.   The right ventricle is mildly enlarged and decreased in function.   Mild tricuspid regurgitation. PASP 70mmHg. PHTN   The right atrium is moderately dilated.  IVC size is dilated (>2.1 cm) but collapses > 50% with respiration   consistent with elevated RA pressure (8 mmHg).    11/13/19    TC  124, HDL 57, LDL 54, TG 66.    Hgb 12.1, Hct 37.5  BUN 24, Creat 1.0, K+ 4.5 (on spironolactone 12.5mg)  A1C 6.2 down from 6.7 in April 2019  TSH 1.84    Echo 8/29/14  Normal left ventricle size and systolic function with an estimated ejection   fraction of 55%. No regional wall motion abnormalities are seen.   -There is mild concentric left ventricular hypertrophy.   -No A wave.  E/e\"= 12.   -Trace of mitral regurgitation.   -Biatrial enlargement.   -Mild tricuspid regurgitation. RVSP 41mmHg.     [facility]  Dr. Sunita Giles   Normal coronaries. Non obstructive CAD. Normal LV function. Labs were reviewed including labs from other hospital systems through [facility]. Cardiac testing was reviewed including echos, nuclear scans, cardiac catheterization, including from other hospital systems through [facility]. Assessment:    1. Chronic diastolic heart failure ([address]    2. Chronic atrial fibrillation (HCC)    3. SOB (shortness of breath)    4. Pure hypercholesterolemia    5. LUZ MARINA (obstructive sleep apnea)    6. Essential hypertension      1. Chronic diastolic heart failure:  Stable. Compensated by exam.  No SOB or edema at present.   -Having SOB since Covid Nov 2020, but is starting to feel better, takes walks. Down 6# since Sept 2020.  -He takes spironolactone 25mg  -He takes Lasix 20mg qd (Now every other day)   -ProBNP> 1509 (10/30/2019)> 1846 (3/4/2020)> 2018 (6/29/2020)> 2296 (9/30/2020)  -Echo 9/2019 shows mild concentric left ventricular hypertrophy. EF 60%. ~Echo 2/5/21 EF 60% and RVSP 53mmHg.    ~ Creat improved from 1.9 to 1.5 with decreased Lasix dose. Pulmonary hypertension:  Stable  -Continue Spironolactone, Lisinopril/HCTZ. -ECHO today 2/5/21> PASP 53mmHg > improved from 70mmHg in 9/2019  -ECHO 9/25/19> RVSP 70mmHg. 2. Chronic atrial fibrillation: HR irregular & controlled. -Remains on Coumadin. Followed by Dr. Mcgraw Found on Coumadin therapy. Managed by [facility] coumad[facility]. Hx GI bleed. Now off all ibuprofen. H/H stable. Discussed Watchman but bleeding issues. 3. Pure hypercholesterolemia:  Continue Lipitor 10mg daily. 12/17/2020> , , HDL 36, LDL 73   ~6/14/21  , HDL 38, LDL 66, TG 79. Continues on Lipitor 10mg. 4. Morbid obesity with BMI of 40.0-44.9, adult: Advised weight loss measures.    ~ He has limited ability to exercise due to severe ankle pain.       5. LUZ MARINA (obstructive sleep apnea):  Positive for LUZ MARINA. Continue Bipap therapy. H&H 10.9/33.5 from 12/17/2020. H&H 12.6/39.5 on 2/5/21 Improved. Plan:    1. Call us to confirm your dose of Furosemide at Home. 2.   Continue same medications. 3.   Labs in 6 Months FASTING (CBC, CMP, BNP, Lipid)  4. Discussed Eliquis or Xarelto for atrial fib  5. See Dr. Elizabeth Del Real in 6 months      Scribe's attestation: This note was scribed in the presence of Sven Parks M.D. by Rula James RN     The scribe's documentation has been prepared under my direction and personally reviewed by me in its entirety. I confirm that the note above accurately reflects all work, treatment, procedures, and medical decision making performed by me. Time Based Itemization  A total of 30 minutes was spent on today's patient encounter. If applicable, non-patient-facing activities:  ( x)Preparing to see the patient and reviewing records  (x ) Individual interpretation of results  ( ) Discussion or coordination of care with other health care professionals  ( x) Ordering of unique tests, medications, or procedures  ( x) Documentation within the EHR      I appreciate the opportunity of cooperating in the care of this patient.     Sven Parks M.D., [facility] [place]

## 2021-06-16 ENCOUNTER — OFFICE VISIT (OUTPATIENT)
Dept: CARDIOLOGY CLINIC | Age: 71
End: 2021-06-16
Payer: COMMERCIAL

## 2021-06-16 ENCOUNTER — HOSPITAL ENCOUNTER (OUTPATIENT)
Age: 71
Discharge: HOME | End: 2021-06-16
Payer: COMMERCIAL

## 2021-06-16 VITALS
OXYGEN SATURATION: 95 % | WEIGHT: 292 LBS | DIASTOLIC BLOOD PRESSURE: 70 MMHG | BODY MASS INDEX: 41.8 KG/M2 | HEART RATE: 76 BPM | HEIGHT: 70 IN | SYSTOLIC BLOOD PRESSURE: 106 MMHG

## 2021-06-16 DIAGNOSIS — I50.32 CHRONIC DIASTOLIC HEART FAILURE (HCC): Primary | ICD-10-CM

## 2021-06-16 DIAGNOSIS — R06.02 SOB (SHORTNESS OF BREATH): ICD-10-CM

## 2021-06-16 DIAGNOSIS — G47.33 OSA (OBSTRUCTIVE SLEEP APNEA): ICD-10-CM

## 2021-06-16 DIAGNOSIS — I48.20 CHRONIC ATRIAL FIBRILLATION (HCC): ICD-10-CM

## 2021-06-16 DIAGNOSIS — I50.32 CHRONIC DIASTOLIC HEART FAILURE (HCC): ICD-10-CM

## 2021-06-16 DIAGNOSIS — E78.00 PURE HYPERCHOLESTEROLEMIA: ICD-10-CM

## 2021-06-16 DIAGNOSIS — I10 ESSENTIAL HYPERTENSION: ICD-10-CM

## 2021-06-16 LAB — PRO-BNP: 2028 PG/ML (ref 0–124)

## 2021-06-16 PROCEDURE — 99214 OFFICE O/P EST MOD 30 MIN: CPT | Performed by: INTERNAL MEDICINE

## 2021-06-16 PROCEDURE — 83880 ASSAY OF NATRIURETIC PEPTIDE: CPT

## 2021-06-16 PROCEDURE — 36415 COLL VENOUS BLD VENIPUNCTURE: CPT

## 2021-06-16 NOTE — PATIENT INSTRUCTIONS
Plan:    1. Call us to confirm your dose of Furosemide at Home. 2.   Continue same medications. 3.   Labs in 6 Months FASTING  4. Discussed Eliquis or Xarelto for AF  5.   See Dr. Nancy Church in 6 months

## 2021-07-19 ENCOUNTER — OFFICE VISIT (OUTPATIENT)
Dept: ORTHOPEDIC SURGERY | Age: 71
End: 2021-07-19
Payer: COMMERCIAL

## 2021-07-19 VITALS — HEIGHT: 70 IN | TEMPERATURE: 98.6 F | BODY MASS INDEX: 41.8 KG/M2 | WEIGHT: 292 LBS

## 2021-07-19 DIAGNOSIS — M17.11 PRIMARY OSTEOARTHRITIS OF RIGHT KNEE: Primary | ICD-10-CM

## 2021-07-19 DIAGNOSIS — M17.12 PRIMARY OSTEOARTHRITIS OF LEFT KNEE: ICD-10-CM

## 2021-07-19 PROCEDURE — 99213 OFFICE O/P EST LOW 20 MIN: CPT | Performed by: ORTHOPAEDIC SURGERY

## 2021-07-19 PROCEDURE — 20610 DRAIN/INJ JOINT/BURSA W/O US: CPT | Performed by: ORTHOPAEDIC SURGERY

## 2021-07-19 RX ORDER — ROPIVACAINE HYDROCHLORIDE 5 MG/ML
30 INJECTION, SOLUTION EPIDURAL; INFILTRATION; PERINEURAL ONCE
Status: COMPLETED | OUTPATIENT
Start: 2021-07-19 | End: 2021-07-19

## 2021-07-19 RX ORDER — METHYLPREDNISOLONE ACETATE 40 MG/ML
40 INJECTION, SUSPENSION INTRA-ARTICULAR; INTRALESIONAL; INTRAMUSCULAR; SOFT TISSUE ONCE
Status: COMPLETED | OUTPATIENT
Start: 2021-07-19 | End: 2021-07-19

## 2021-07-19 RX ADMIN — METHYLPREDNISOLONE ACETATE 40 MG: 40 INJECTION, SUSPENSION INTRA-ARTICULAR; INTRALESIONAL; INTRAMUSCULAR; SOFT TISSUE at 11:21

## 2021-07-19 RX ADMIN — ROPIVACAINE HYDROCHLORIDE 30 ML: 5 INJECTION, SOLUTION EPIDURAL; INFILTRATION; PERINEURAL at 11:23

## 2021-07-19 RX ADMIN — METHYLPREDNISOLONE ACETATE 40 MG: 40 INJECTION, SUSPENSION INTRA-ARTICULAR; INTRALESIONAL; INTRAMUSCULAR; SOFT TISSUE at 11:22

## 2021-07-19 RX ADMIN — ROPIVACAINE HYDROCHLORIDE 30 ML: 5 INJECTION, SOLUTION EPIDURAL; INFILTRATION; PERINEURAL at 11:22

## 2021-07-19 NOTE — PROGRESS NOTES
Administrations This Visit     methylPREDNISolone acetate (DEPO-MEDROL) injection 40 mg     Admin Date  07/19/2021  11:21 Action  Given Dose  40 mg Route  Intramuscular Site  Knee Left Administered By  Alexander Hollingsworth    Ordering Provider: Darrion Beckham MD    NDC: 5851-3412-77    Lot#: [id number]    : Ettie Pollack. Patient Supplied?: No           Admin Date  07/19/2021  11:22 Action  Given Dose  40 mg Route  Intramuscular Site  Knee Right Administered By  Alexander Hollingsworth    Ordering Provider: Darrion Beckham MD    NDC: 7375-1843-35    Lot#: [id number]    : Ettie Pollack.     Patient Supplied?: No          ropivacaine (NAROPIN) 0.5% injection 30 mL     Admin Date  07/19/2021  11:22 Action  Given Dose  30 mL Route  Epidural Site  Knee Left Administered By  Alexander Hollingsworth    Ordering Provider: Darrion Beckham MD    [address]: 23890-966-34    Lot#: [id number]    : [facility]    Patient Supplied?: No           Admin Date  07/19/2021  11:23 Action  Given Dose  30 mL Route  Epidural Site  Knee Right Administered By  Alexander Hollingsworth    Ordering Provider: Darrion Beckham MD    NDC: 87492-769-19    Lot#: [id number]    : [address]    Patient Supplied?: No

## 2021-07-23 RX ORDER — PANTOPRAZOLE SODIUM 40 MG/1
TABLET, DELAYED RELEASE ORAL
Qty: 90 TABLET | Refills: 0 | Status: SHIPPED | OUTPATIENT
Start: 2021-07-23

## 2021-07-23 NOTE — TELEPHONE ENCOUNTER
Refill request sent for 90 days. Will need to follow-up before he runs out if he wants to reestablish care with me. He is due for AWV.

## 2021-07-23 NOTE — TELEPHONE ENCOUNTER
Requested Prescriptions     Pending Prescriptions Disp Refills    pantoprazole (PROTONIX) 40 MG tablet [Pharmacy Med Name: PANTOPRAZOLE 40MG TABLETS] 90 tablet 0     Sig: TAKE 1 TABLET(40 MG) BY MOUTH DAILY      Last OV Visit date not found

## 2021-07-26 ENCOUNTER — OFFICE VISIT (OUTPATIENT)
Dept: ORTHOPEDIC SURGERY | Age: 71
End: 2021-07-26
Payer: COMMERCIAL

## 2021-07-26 VITALS — HEIGHT: 70 IN | WEIGHT: 292 LBS | BODY MASS INDEX: 41.8 KG/M2

## 2021-07-26 DIAGNOSIS — G56.02 CARPAL TUNNEL SYNDROME OF LEFT WRIST: ICD-10-CM

## 2021-07-26 DIAGNOSIS — G56.01 CARPAL TUNNEL SYNDROME OF RIGHT WRIST: Primary | ICD-10-CM

## 2021-07-26 PROCEDURE — 99213 OFFICE O/P EST LOW 20 MIN: CPT | Performed by: ORTHOPAEDIC SURGERY

## 2021-07-26 PROCEDURE — 20605 DRAIN/INJ JOINT/BURSA W/O US: CPT | Performed by: ORTHOPAEDIC SURGERY

## 2021-07-26 RX ORDER — METHYLPREDNISOLONE ACETATE 40 MG/ML
40 INJECTION, SUSPENSION INTRA-ARTICULAR; INTRALESIONAL; INTRAMUSCULAR; SOFT TISSUE ONCE
Status: COMPLETED | OUTPATIENT
Start: 2021-07-26 | End: 2021-07-26

## 2021-07-26 RX ORDER — METHYLPREDNISOLONE ACETATE 40 MG/ML
40 INJECTION, SUSPENSION INTRA-ARTICULAR; INTRALESIONAL; INTRAMUSCULAR; SOFT TISSUE ONCE
Status: CANCELLED | OUTPATIENT
Start: 2021-07-26 | End: 2021-07-26

## 2021-07-26 RX ADMIN — METHYLPREDNISOLONE ACETATE 40 MG: 40 INJECTION, SUSPENSION INTRA-ARTICULAR; INTRALESIONAL; INTRAMUSCULAR; SOFT TISSUE at 10:37

## 2021-07-26 NOTE — PROGRESS NOTES
Administrations This Visit     methylPREDNISolone acetate (DEPO-MEDROL) injection 40 mg     Admin Date  07/26/2021  10:37 Action  Given Dose  40 mg Route  Intramuscular Site  Other Administered By  Janeth Graham    Ordering Provider: Shiraz Dhillon MD    NDC: 7727-9759-51    Lot#: [id number]    : [address]     Patient Supplied?: No    Comments: Rt CTS

## 2021-09-07 RX ORDER — LISINOPRIL AND HYDROCHLOROTHIAZIDE 20; 12.5 MG/1; MG/1
1 TABLET ORAL DAILY
Qty: 90 TABLET | Refills: 3 | Status: SHIPPED | OUTPATIENT
Start: 2021-09-07 | End: 2022-01-03

## 2021-11-15 ENCOUNTER — OFFICE VISIT (OUTPATIENT)
Dept: ORTHOPEDIC SURGERY | Age: 71
End: 2021-11-15
Payer: COMMERCIAL

## 2021-11-15 VITALS — HEIGHT: 70 IN | WEIGHT: 304 LBS | BODY MASS INDEX: 43.52 KG/M2

## 2021-11-15 DIAGNOSIS — M17.12 PRIMARY OSTEOARTHRITIS OF LEFT KNEE: ICD-10-CM

## 2021-11-15 DIAGNOSIS — M17.11 PRIMARY OSTEOARTHRITIS OF RIGHT KNEE: Primary | ICD-10-CM

## 2021-11-15 PROCEDURE — 99213 OFFICE O/P EST LOW 20 MIN: CPT | Performed by: ORTHOPAEDIC SURGERY

## 2021-11-15 PROCEDURE — 20610 DRAIN/INJ JOINT/BURSA W/O US: CPT | Performed by: ORTHOPAEDIC SURGERY

## 2021-11-15 RX ORDER — ROPIVACAINE HYDROCHLORIDE 5 MG/ML
30 INJECTION, SOLUTION EPIDURAL; INFILTRATION; PERINEURAL ONCE
Status: COMPLETED | OUTPATIENT
Start: 2021-11-15 | End: 2021-11-15

## 2021-11-15 RX ORDER — METHYLPREDNISOLONE ACETATE 40 MG/ML
40 INJECTION, SUSPENSION INTRA-ARTICULAR; INTRALESIONAL; INTRAMUSCULAR; SOFT TISSUE ONCE
Status: COMPLETED | OUTPATIENT
Start: 2021-11-15 | End: 2021-11-15

## 2021-11-15 RX ADMIN — ROPIVACAINE HYDROCHLORIDE 30 ML: 5 INJECTION, SOLUTION EPIDURAL; INFILTRATION; PERINEURAL at 10:56

## 2021-11-15 RX ADMIN — ROPIVACAINE HYDROCHLORIDE 30 ML: 5 INJECTION, SOLUTION EPIDURAL; INFILTRATION; PERINEURAL at 10:55

## 2021-11-15 RX ADMIN — METHYLPREDNISOLONE ACETATE 40 MG: 40 INJECTION, SUSPENSION INTRA-ARTICULAR; INTRALESIONAL; INTRAMUSCULAR; SOFT TISSUE at 10:55

## 2021-11-15 RX ADMIN — METHYLPREDNISOLONE ACETATE 40 MG: 40 INJECTION, SUSPENSION INTRA-ARTICULAR; INTRALESIONAL; INTRAMUSCULAR; SOFT TISSUE at 10:54

## 2021-11-15 NOTE — PROGRESS NOTES
Turns today for bilateral knee pain. He was seen about 3 months ago and had injections of steroid in both knees. He was doing well until recently he fell says both his knees are aching and more. His BMI is above 40 and he has both extension lag and limits in his flexion. He has medial compartment arthrosis in both knees. He has no new signs such as locking or catching. ROS: Pertinent items are noted in HPI. No notes on file    Past Medical History:  No date: Atrial fibrillation ([facility]  No date: CAD (coronary artery disease)  No date: Cancer ([facility]      Comment:  skin  No date: Diabetes mellitus ([facility]  No date: Hypercholesteremia  No date: Hypertension  No date: Obesity  No date: Sleep apnea  2/2014: Ulcer of pyloric antrum      Comment:  anemia/GI bleed while on NSAIDS     Past Surgical History:  No date: COLONOSCOPY  No date: CYST REMOVAL  8/24/06: DIAGNOSTIC CARDIAC CATH LAB PROCEDURE  No date: KNEE SURGERY      Comment:  arthroscopy bilateral  02/07/14: UPPER GASTROINTESTINAL ENDOSCOPY      Comment:  EGD/Colonoscopy with biopsy of ulcer  03/19/2018: UPPER GASTROINTESTINAL ENDOSCOPY      Comment:  with bx    Review of patient's family history indicates:  Problem: Heart Disease      Relation: Mother          Age of Onset: (Not Specified)  Problem: Cancer      Relation: Father          Age of Onset: (Not Specified)  Problem: Cancer      Relation: Sister          Age of Onset: (Not Specified)          Comment: breast      Social History    Socioeconomic History      Marital status:       Spouse name: None      Number of children: None      Years of education: None      Highest education level: None    Occupational History      None    Tobacco Use      Smoking status: Never Smoker      Smokeless tobacco: Never Used    Vaping Use      Vaping Use: Never used    Substance and Sexual Activity      Alcohol use:  Yes        Alcohol/week: 1.0 standard drink        Types: 1 Cans of beer per week        Comment: occas      Drug use: No      Sexual activity: Yes        Partners: Female    Other Topics      Concerns:        None    Social History Narrative      None    Social Determinants of Health  Financial Resource Strain:       Difficulty of Paying Living Expenses: Not on file  Food Insecurity:       Worried About Running Out of Food in the Last Year: Not on file      Ran Out of Food in the Last Year: Not on file  Transportation Needs:       Lack of Transportation (Medical): Not on file      Lack of Transportation (Non-Medical):  Not on file  Physical Activity:       Days of Exercise per Week: Not on file      Minutes of Exercise per Session: Not on file  Stress:       Feeling of Stress : Not on file  Social Connections:       Frequency of Communication with Friends and Family: Not on file      Frequency of Social Gatherings with Friends and Family: Not on file      Attends [employer] Services: Not on file      Active Member of [address] [place] [address] or Organizations: Not on file      Attends Club or Organization Meetings: Not on file      Marital Status: Not on file  Intimate Partner Violence:       Fear of Current or Ex-Partner: Not on file      Emotionally Abused: Not on file      Physically Abused: Not on file      Sexually Abused: Not on file  Housing Stability:       Unable to Pay for Housing in the Last Year: Not on file      Number of Places Lived in the Last Year: Not on file      Unstable Housing in the Last Year: Not on file    Current Outpatient Medications:  lisinopril-hydroCHLOROthiazide (PRINZIDE;ZESTORETIC) 20-12.5 MG per tablet, Take 1 tablet by mouth daily, Disp: 90 tablet, Rfl: 3  metoprolol tartrate (LOPRESSOR) 25 MG tablet, Take 1 tablet by mouth 2 times daily, Disp: 180 tablet, Rfl: 3  pantoprazole (PROTONIX) 40 MG tablet, TAKE 1 TABLET(40 MG) BY MOUTH DAILY, Disp: 90 tablet, Rfl: 0  vitamin D3 (CHOLECALCIFEROL) 25 MCG (1000 UT) TABS tablet, Take 1 tablet by mouth daily, Disp: 90 tablet, Rfl: 0  spironolactone (ALDACTONE) 25 MG tablet, TAKE 1 TABLET BY MOUTH DAILY, Disp: 90 tablet, Rfl: 3  dilTIAZem (CARDIZEM CD) 240 MG extended release capsule, TAKE 1 CAPSULE BY MOUTH DAILY, Disp: 90 capsule, Rfl: 3  furosemide (LASIX) 20 MG tablet, Take 1 tablet by mouth daily Or as directed, Disp: 90 tablet, Rfl: 3  warfarin (COUMADIN) 5 MG tablet, One tab daily, as directedOn Saturday 7/13 and Sunday 7/14 take only 1/2 tabs. Get INR on Monday 7/15 (Patient taking differently: [facility], as directedOn Saturday 7/13 and Sunday 7/14 take only 1/2 tabs. Get INR on Monday 7/15), Disp: 30 tablet, Rfl: 3  metFORMIN (GLUCOPHAGE) 1000 MG tablet, Take 1,000 mg by mouth 2 times daily (with meals). , Disp: , Rfl:   zolpidem (AMBIEN) 10 MG tablet, nightly as needed . , Disp: , Rfl: 2  atorvastatin (LIPITOR) 10 MG tablet, Take 1 tablet by mouth daily. , Disp: 30 tablet, Rfl: 0  aspirin EC 81 MG EC tablet, Take 1 tablet by mouth daily. , Disp: 30 tablet, Rfl: 3    No current facility-administered medications for this visit. -- No Known Allergies     VITAL SIGNS:  Ht 5' 10\" (1.778 m)   Wt (!) 304 lb (137.9 kg)   BMI 43.62 kg/m²    Examination of the right knee today shows approximately 10 degree lack of extension he flexes to 85 degrees. On his left he lacks about 10 degrees of extension as well but can flex to 105 degrees. On the right knee there is no warmth, erythema, or effusion. There is just a couple millimeters pseudolaxity medially. He has negative Lachman's and pivot shift. He is tender mainly in the medial joint line. Jonathan's testing does not produce clunk or shift. He has no lateral joint line tenderness or retinacular tenderness. Examination of the left knee today shows there is no warmth, erythema, or effusion. He has essentially no pseudolaxity medially. He does have medial joint line tenderness. Jonathan's testing does not increase pain or produce clunk or shift.   He has no lateral joint line tenderness or retinacular tenderness. A single standing AP x-ray was obtained today. When compared with his previous x-ray of about a year ago he shows that he is gone from 1 to 2 mm of medial joint space on the right down to bone-on-bone he is gone from about 2 mm to 1 mm medial joint space on the left. Impression: Bilateral varus osteoarthritis. Plan: After sterile prep injected the right knee with 80 mg of Depo-Medrol and 15 mg of ropivacaine. Patient tolerated this procedure well. After sterile prep injected left knee with 80 mg of Depo-Medrol and 15 mg of ropivacaine. Patient tolerated this procedure well. We discussed possibility of viscosupplementation. However this patient is 80years old and is probably a better candidate for total knee arthroplasty. He already has an appoint with another orthopedic surgeon since he knows I'm retiring. We will see him back as needed. Total time spent with this patient was approximately 20 minutes.

## 2021-11-15 NOTE — PROGRESS NOTES
Administrations This Visit     methylPREDNISolone acetate (DEPO-MEDROL) injection 40 mg     Admin Date  11/15/2021  10:54 Action  Given Dose  40 mg Route  IntraMUSCular Site  Knee Left Administered By  [facility]    Ordering Provider: Clif Blum MD    NDC: 0417-9601-30    Lot#: [id number]    : [address] Patient Supplied?: No           Admin Date  11/15/2021  10:55 Action  Given Dose  40 mg Route  IntraMUSCular Site  Knee Right Administered By  [facility]    Ordering Provider: Clif Blum MD    NDC: 9356-7123-41    Lot#: [id number]    : [address]     Patient Supplied?: No          ropivacaine (NAROPIN) 0.5% injection 30 mL     Admin Date  11/15/2021  10:55 Action  Given Dose  30 mL Route  Epidural Site  Knee Left Administered By  [facility]    Ordering Provider: Clif Blum MD    [address]: 79713-908-62    Lot#: [id number]    : [facility]    Patient Supplied?: No           Admin Date  11/15/2021  10:56 Action  Given Dose  30 mL Route  Epidural Site  Knee Right Administered By  [facility]    Ordering Provider: Clif Blum MD    NDC: 73277-989-95    Lot#: [id number]    : [facility]    Patient Supplied?: No

## 2022-01-03 ENCOUNTER — OFFICE VISIT (OUTPATIENT)
Dept: ORTHOPEDIC SURGERY | Age: 72
End: 2022-01-03
Payer: COMMERCIAL

## 2022-01-03 VITALS — HEIGHT: 70 IN | BODY MASS INDEX: 44.38 KG/M2 | WEIGHT: 310 LBS

## 2022-01-03 DIAGNOSIS — G56.03 BILATERAL CARPAL TUNNEL SYNDROME: Primary | ICD-10-CM

## 2022-01-03 DIAGNOSIS — Z01.818 PRE-OP TESTING: Primary | ICD-10-CM

## 2022-01-03 DIAGNOSIS — G56.23 CUBITAL TUNNEL SYNDROME, BILATERAL: ICD-10-CM

## 2022-01-03 PROCEDURE — 99204 OFFICE O/P NEW MOD 45 MIN: CPT | Performed by: ORTHOPAEDIC SURGERY

## 2022-01-03 RX ORDER — GLIMEPIRIDE 1 MG/1
1 TABLET ORAL 2 TIMES DAILY
COMMUNITY

## 2022-01-03 NOTE — LETTER
CONSENT TO OPERATION  AND/OR OTHER PROCEDURE(S)          PATIENT : Lawrence Rodriguez   YOB: 1950      DATE : 1/3/22          1. I request and consent that Dr. Siddhartha Arguellesails. Monica Fleming,  and/or his associates or assistants perform an operation and/or procedures on the above patient at  [place] [address], to treat the condition(s) which appear indicated by the diagnostic studies already performed. I have been told that in general terms the nature, purpose and reasonable expectations of the operation and/or procedure(s) are:     Right Carpal Tunnel and Cubital Tunnel Release      2. It has been explained to me by the informing physician that during the course of the operation and/or procedure(s) unforeseen conditions may be revealed that necessitate an extension of the original operation and/or procedure(s) or different operation and/or procedures than those set forth in Paragraph 1. I therefore authorize and request that my physician and/or his associates or assistants perform such operations and/or procedures as are necessary and desirable in the exercise of professional judgment. The authority granted under this Paragraph 2 shall extend to all conditions that require treatment and are known to my physician at the time the operation is commenced. 3. I have been made aware by the informing physician of certain risks and consequences that are associated with the operation and/or procedure(s) described in Paragraph 1, the reasonable alternative methods or treatment, the possible consequences, the possibility that the operation and/or procedure(s) may be unsuccessful and the possibility of complications.   I understand the reasonably known risks to be:      - Bleeding  - Infection  - Poor Healing  - Possible Damage to Nerve, Vessel, Tendon/Muscle or Bone  - Need for further Treatment/Surgery  - Stiffness  - Pain  - Residual or Recurrent Symptoms  - Anesthetic and/or Medical Risks  - We have discussed the specific limitations and risks of hospital and/or office based treatment at this time due to the COVID-19 pandemic                I have been counseled about the risks of elliot Covid-19 in the antoinette-operative and post-operative periods related to this procedure. I have been made aware that elliot Covid-19 around the time of a surgical procedure may worsen my prognosis for recovering from the virus and lend to a higher morbidity and or mortality risk. With this knowledge, I have requested to proceed with the procedure as scheduled. 4. I have also been informed by the informing physician that there are other risks from both known and unknown causes that are attendant to the performance of any surgical procedure. I am aware that the practice of medicine and surgery is not an exact science, and that no guarantees have been made to me concerning the results of the operation and/or procedure(s). 5. I   CONSENT / REFUSE CONSENT  (strike the phrase that does not apply) to the taking of photographs before, during and/or after the operation or procedure for scientific/educational purposes. 6. I consent to the administration of anesthesia and to the use of such anesthetics as may be deemed advisable by the anesthesiologist who has been engaged by me or my physician.     7. I certify that I have read and understand the above consent to operation and/or other procedure(s); that the explanations therein referred to were made to me by the informing physician in advance of my signing this consent; that all blanks or statements requiring insertion or completion were filled in and inapplicable paragraphs, if any, were stricken before I signed; and that all questions asked by me about the operation and/or procedure(s) which I have consented to have been fully answered in a satisfactory manner.                                 _______________________           1/3/22 Witness     Signature Of Patient         Date        Mahesh Murphy                                                 Informing Physician                                           Signature of Informing Physician                              If patient is unable to sign or is a minor, complete one of the following:    (A)  Patient is a minor   years of age. (B)  Patient is unable to sign because: The undersigned represents that he or she is duly authorized to execute this consent for and on behalf of the above named patient. Witness               o  Parent  o  Guardian   o  Spouse       o  Other (specify)                                           Patient Name: Liz Burns  Patient YOB: 1950  Dr. Ravinder Ewing' Return To Work Policy  Regarding your ability to return to work after surgery or injury, Dr. Ravinder Ewing will not state that any patient is off of work or cannot work at all. He will place you on restrictions after your surgical procedure or injury. Depending on the details of your particular situation, Dr. Ravinder Ewing may state that you will have either light use or no use of your hand for a specific number of weeks. It is your obligation to communicate with your employer regarding your restrictions. It is your employer's decision as to whether they will accommodate your restrictions (i.e. allow you to come to work in your restricted capacity) or to not allow you to return to work under your restrictions. Dr. Ravinder Ewing does not participate in making this decision and cannot influence your employer regarding their decision. If you do not communicate your restrictions to your employer, or if you do not present to work as you are scheduled to, Dr. Ravinder Ewing will not provide an 'excuse' to explain your absence. A doctors note, or official forms (BWC, FMLA, etc.) will be filled out, upon request, to indicate your date of surgery and your restrictions as stated above.   Dr. Ravinder Ewing' Narcotic Policy  Patients will only be prescribed narcotics after surgical procedures or significant injury. Not all procedures cause pain great enough to require Narcotics and thus, not all patients will receive prescriptions after surgical procedures or injuries. Narcotics are never prescribed for chronic conditions. Narcotics are never prescribed for use longer than one week at a time. Refills are only granted in unusual circumstances and only at Dr. Riky Christina discretion. Patients who are receiving narcotic medication from another physician or who are under pain management contracts will not be given a prescription for narcotics for any reason. Surgery Arrival Time:  You have been advised of the START TIME for your surgery as well as the ARRIVAL TIME at which you need to arrive at the surgery facility. Please understand that there is a certain amount of preparation which takes place at the surgery facility prior to the start of your surgery. If you arrive later than your scheduled ARRIVAL TIME, it may be necessary to cancel your surgery for that day and reschedule your procedure due to lack of adequate time for pre-surgery preparations. Thank you for being on time for your arrival.    I have read the above policies and understand that by agreeing to proceed with treatment by Dr. Dotty Dent and his team, that I am agreeing to abide by these policies.   Patient Name:  Simi East    Patient Signature:  _____________________________    MICHAEL Date:   1/3/22

## 2022-01-03 NOTE — PROGRESS NOTES
This 80 y.o., right hand dominant retired man is seen in referral for Aayush Roberts MD with a chief complaint of numbness and tingling of the bilateral hands, R>L. Symptoms have been present for several years. The patient also frequently notices pain in the hands, wrists and arms, as well as weakness of , morning stiffness and swelling as well as difficulty performing functions which require fine touch. Symptoms are sometimes worse at night and can disturb sleep. The problem appears to recently have become worse. Conservative treatment has been prescribed(brace, steroid injections 4/right and 1/left). There is no history of wrist fracture. There is history and/or family history of diabetes. There is no history of thyroid disease. There is no family history of carpal tunnel syndrome. The pain assessment has been reviewed and is correct as stated. The patient's social history, past medical history, family history, medications, allergies and review of systems, entered 1/3/21, have been reviewed, and dated and are recorded in the chart. He takes aspirin and warfarin. On examination the patient is Height: 5' 10\" (177.8 cm) tall and weighs Weight: (!) 310 lb (140.6 kg). Respirations are 18 per minute. The patient is well nourished, is oriented to time and place, demonstrates appropriate mood and affect as well as normal gait and station. Cervical range of motion is normal for the patient's stated age and does not produce pain or paresthesias in either upper extremity. There is soft tissue swelling involving the volar aspect of the bilateral wrists. There is mild bilateral thenar muscle atrophy and moderate bilateral intrinsic muscle atrophy. The Tinel sign is positive over the median nerve at the  carpal tunnel bilaterally and positive over the ulnar nerve at the elbow bilaterally. The Phalen test is positive on the right at 0 seconds.   There is hypalgesia, with associated dysesthesia, on sensory testing over the median nerve distribution. Two point discrimination is abnormal at the tip of the right middle finger. Range of motion of the fingers, thumbs and wrists is normal.  Skin is intact without lesions. Distal circulation is normal.  All joints are stable. There is weakness of the abductor digiti quinti muscles bilaterally. Deep tendon reflexes are brisk and present bilaterally. There are no subcutaneous masses or enlarged epitrochlear lymph nodes. I have personally reviewed and interpreted all previous external imaging studies(elbow Xrays), laboratory tests(CBC,HbA1c,CMP) diagnostic procedures(EMG) and medical encounters pertinent to this patient's visit today. Review and independent interpretation of an external EMG study, dated 6/10/20 , preformed by Dr. Corbin Del Rosario, a physician outside of this office, is abnormal. There is severe right carpal tunnel and cubital syndrome. The left was not tested. The test results are shared with the patient. Impression:     Carpal tunnel syndrome, bilateral, with clinical evidence of nerve damage. Cubital tunnel syndrome, bilateral, with clinical evidence of nerve damage. The nature of their medical problem is fully discussed with the patient, including all treatment options. Surgery is also discussed, including the possible risks, complications, prognosis and postoperative care. All questions are answered. The surgery consent form is explained and signed. Surgery will be scheduled and the patient is asked to call me if there are any additional questions. The patient understands that the surgery will be done by Dr. Arvell Snellen. The patient is instructed to stop taking aspirin 7 days before surgery and the warfarin 5 days before surgery. They will check with their medical doctor(s), if needed, to ask permission to do so.

## 2022-01-03 NOTE — LETTER
CONSENT TO OPERATION  AND/OR OTHER PROCEDURE(S)          PATIENT : Lawrence Rodriguez   YOB: 1950      DATE : 1/3/22          1. I request and consent that Dr. Buster Mccoy. Zoey Pinto,  and/or his associates or assistants perform an operation and/or procedures on the above patient at  [place] [address], to treat the condition(s) which appear indicated by the diagnostic studies already performed. I have been told that in general terms the nature, purpose and reasonable expectations of the operation and/or procedure(s) are:     Right Carpal Tunnel Release      2. It has been explained to me by the informing physician that during the course of the operation and/or procedure(s) unforeseen conditions may be revealed that necessitate an extension of the original operation and/or procedure(s) or different operation and/or procedures than those set forth in Paragraph 1. I therefore authorize and request that my physician and/or his associates or assistants perform such operations and/or procedures as are necessary and desirable in the exercise of professional judgment. The authority granted under this Paragraph 2 shall extend to all conditions that require treatment and are known to my physician at the time the operation is commenced. 3. I have been made aware by the informing physician of certain risks and consequences that are associated with the operation and/or procedure(s) described in Paragraph 1, the reasonable alternative methods or treatment, the possible consequences, the possibility that the operation and/or procedure(s) may be unsuccessful and the possibility of complications.   I understand the reasonably known risks to be:      - Bleeding  - Infection  - Poor Healing  - Possible Damage to Nerve, Vessel, Tendon/Muscle or Bone  - Need for further Treatment/Surgery  - Stiffness  - Pain  - Residual or Recurrent Symptoms  - Anesthetic and/or Medical Risks  - We have discussed the specific limitations and risks of hospital and/or office based treatment at this time due to the COVID-19 pandemic                I have been counseled about the risks of elliot Covid-19 in the antoinette-operative and post-operative periods related to this procedure. I have been made aware that elliot Covid-19 around the time of a surgical procedure may worsen my prognosis for recovering from the virus and lend to a higher morbidity and or mortality risk. With this knowledge, I have requested to proceed with the procedure as scheduled. 4. I have also been informed by the informing physician that there are other risks from both known and unknown causes that are attendant to the performance of any surgical procedure. I am aware that the practice of medicine and surgery is not an exact science, and that no guarantees have been made to me concerning the results of the operation and/or procedure(s). 5. I   CONSENT / REFUSE CONSENT  (strike the phrase that does not apply) to the taking of photographs before, during and/or after the operation or procedure for scientific/educational purposes. 6. I consent to the administration of anesthesia and to the use of such anesthetics as may be deemed advisable by the anesthesiologist who has been engaged by me or my physician.     7. I certify that I have read and understand the above consent to operation and/or other procedure(s); that the explanations therein referred to were made to me by the informing physician in advance of my signing this consent; that all blanks or statements requiring insertion or completion were filled in and inapplicable paragraphs, if any, were stricken before I signed; and that all questions asked by me about the operation and/or procedure(s) which I have consented to have been fully answered in a satisfactory manner.                                 _______________________           1/3/22 Witness     Signature Of Patient         Date        Gabrielle Murphy                                                 Informing Physician                                           Signature of Informing Physician                              If patient is unable to sign or is a minor, complete one of the following:    (A)  Patient is a minor   years of age. (B)  Patient is unable to sign because: The undersigned represents that he or she is duly authorized to execute this consent for and on behalf of the above named patient. Witness               o  Parent  o  Guardian   o  Spouse       o  Other (specify)                                           Patient Name: Addison Tello  Patient YOB: 1950  Dr. Damian Grace' Return To Work Policy  Regarding your ability to return to work after surgery or injury, Dr. Damian Grace will not state that any patient is off of work or cannot work at all. He will place you on restrictions after your surgical procedure or injury. Depending on the details of your particular situation, Dr. Damian Grace may state that you will have either light use or no use of your hand for a specific number of weeks. It is your obligation to communicate with your employer regarding your restrictions. It is your employer's decision as to whether they will accommodate your restrictions (i.e. allow you to come to work in your restricted capacity) or to not allow you to return to work under your restrictions. Dr. Damian Grace does not participate in making this decision and cannot influence your employer regarding their decision. If you do not communicate your restrictions to your employer, or if you do not present to work as you are scheduled to, Dr. Damian Grace will not provide an 'excuse' to explain your absence. A doctors note, or official forms (BWC, FMLA, etc.) will be filled out, upon request, to indicate your date of surgery and your restrictions as stated above.   Dr. Damian Grace' Narcotic Policy  Patients will only be prescribed narcotics after surgical procedures or significant injury. Not all procedures cause pain great enough to require Narcotics and thus, not all patients will receive prescriptions after surgical procedures or injuries. Narcotics are never prescribed for chronic conditions. Narcotics are never prescribed for use longer than one week at a time. Refills are only granted in unusual circumstances and only at Dr. Sandeep Wise discretion. Patients who are receiving narcotic medication from another physician or who are under pain management contracts will not be given a prescription for narcotics for any reason. Surgery Arrival Time:  You have been advised of the START TIME for your surgery as well as the ARRIVAL TIME at which you need to arrive at the surgery facility. Please understand that there is a certain amount of preparation which takes place at the surgery facility prior to the start of your surgery. If you arrive later than your scheduled ARRIVAL TIME, it may be necessary to cancel your surgery for that day and reschedule your procedure due to lack of adequate time for pre-surgery preparations. Thank you for being on time for your arrival.    I have read the above policies and understand that by agreeing to proceed with treatment by Dr. Karel Muhammad and his team, that I am agreeing to abide by these policies.   Patient Name:  Michele Johnson    Patient Signature:  _____________________________    QTBUP'E Date:   1/3/22

## 2022-01-04 DIAGNOSIS — G56.03 BILATERAL CARPAL TUNNEL SYNDROME: Primary | ICD-10-CM

## 2022-01-05 ENCOUNTER — TELEPHONE (OUTPATIENT)
Dept: ORTHOPEDIC SURGERY | Age: 72
End: 2022-01-05

## 2022-01-14 ENCOUNTER — TELEPHONE (OUTPATIENT)
Dept: ORTHOPEDIC SURGERY | Age: 72
End: 2022-01-14

## 2022-01-14 NOTE — PROGRESS NOTES
[facility]   Congestive Heart Failure    PrimaryCare Doctor:  Pritesh Calderon MD  Primary Cardiologist: Balwinder Forde       Chief Complaint:  PHTN    History of Present Illness:  Simi East is a 80 y.o. male with  PHTN, non-obstructive CAD, HTN, HLD, LUZ MARINA, & chronic atrial fibrillation who presents today for clearance for carpal tunnel syndrome on the 25th. At the last OV 6months ago, he c/o nothing and no med changes  Today he/she c/o dyspnea that is at baseline, edema that is improved since PCP stopped ACE/HCTZ and started lasix. He denies chest pain, palpitations, orthopnea, PND, exertional chest pressure/discomfort, fatigue, early saiety, syncope. EKG today w/o acute changes    ER Visit: No  Recent Hospitalization: No    Baseline Weight: 310  Wt Readings from Last 3 Encounters:   01/18/22 (!) 312 lb 1.6 oz (141.6 kg)   01/03/22 (!) 310 lb (140.6 kg)   11/15/21 (!) 304 lb (137.9 kg)          EF: 60%  Cardiac Imaging:  Lexiscan dorene 2/11/21 (for cp)  Summary    Normal LV size and systolic function.    Normal myocardial perfusion study.    Mild apical thinning which is clinically nonsignificant       Stress Protocols        Resting ECG    Normal sinus rhythm. Normal ECG.      Resting HR:53 bpm        Resting BP:113/50 mmHg      ECHO 2/5/2021  -Normal left ventricle size, wall thickness, and systolic function with an estimated ejection fraction of 60%. Indeterminate diastolic function.   -Mild mitral regurgitation.   -The left atrium is dilated.   -Mild tricuspid regurgitation with PASP of 53 mmHg. This is suggestive of moderate pulmonary hypertension.   The right ventricle is mildly dilated.   The right atrial size is dilated.       Activity: decreased due to knee pain  Can you walk 1-2 blocks or do a moderate amount of house/yard work? Yes      NYHA Class:  I         Pt Education: The patient has received education on the following topics: dietary sodium restriction, heart failure medications, the importance of physical activity, symptom management and weight monitoring       Past Medical History:   has a past medical history of Atrial fibrillation ([facility] [address] CAD (coronary artery disease), Cancer ([facility] [address] [facility] Hypercholesteremia, Hypertension, Obesity, Sleep apnea, and Ulcer of pyloric antrum. Surgical History:   has a past surgical history that includes Diagnostic Cardiac Cath Lab Procedure (8/24/06); knee surgery; Colonoscopy; Upper gastrointestinal endoscopy (02/07/14); cyst removal; and Upper gastrointestinal endoscopy (03/19/2018). Social History:   reports that he has never smoked. He has never used smokeless tobacco. He reports current alcohol use of about 1.0 standard drink of alcohol per week. He reports that he does not use drugs. Family History:   Family History   Problem Relation Age of Onset    Heart Disease Mother     Cancer Father     Cancer Sister         breast       HomeMedications:  Prior to Admission medications    Medication Sig Start Date End Date Taking? Authorizing Provider   glimepiride (AMARYL) 1 MG tablet Take 1 mg by mouth every morning (before breakfast)    Historical Provider, MD   metoprolol tartrate (LOPRESSOR) 25 MG tablet Take 1 tablet by mouth 2 times daily 9/1/21   Mela Gonzalez MD   pantoprazole (PROTONIX) 40 MG tablet TAKE 1 TABLET(40 MG) BY MOUTH DAILY 7/23/21   Shahzad Barrientos MD   spironolactone (ALDACTONE) 25 MG tablet TAKE 1 TABLET BY MOUTH DAILY 3/18/21   Mela Gonzalez MD   dilTIAZem (CARDIZEM CD) 240 MG extended release capsule TAKE 1 CAPSULE BY MOUTH DAILY 3/16/21   Mela Gonzalez MD   furosemide (LASIX) 20 MG tablet Take 1 tablet by mouth daily Or as directed 2/5/21   Mela Gonzalez MD   warfarin (COUMADIN) 5 MG tablet One tab daily, as directed  On Saturday 7/13 and Sunday 7/14 take only 1/2 tabs.   Get INR on Monday 7/15  Patient taking differently: Managed by [facility]  One tab daily, as directed  On Saturday 7/13 and Sunday 7/14 take only 1/2 tabs. Get INR on Monday 7/15 7/11/19   SHAMAR Cuadra CNP   zolpidem (AMBIEN) 10 MG tablet nightly as needed . 7/11/14   Historical Provider, MD   atorvastatin (LIPITOR) 10 MG tablet Take 1 tablet by mouth daily. 2/12/14   SHAMAR Walker CNP   aspirin EC 81 MG EC tablet Take 1 tablet by mouth daily. 10/21/13   SHAMAR Walker CNP        Allergies:  No known allergies     ROS:   Review of Systems   Constitutional: Negative. Respiratory: Positive for shortness of breath. Cardiovascular: Positive for leg swelling. Gastrointestinal: Negative. Genitourinary: Negative. Musculoskeletal: Negative. Skin: Negative. Neurological: Negative. Hematological: Negative. Psychiatric/Behavioral: Negative. Physical Examination:    Vitals:    01/18/22 1200   BP: 110/72   Pulse: 65   SpO2: 97%   Weight: (!) 312 lb 1.6 oz (141.6 kg)   Height: 5' 10\" (1.778 m)           Physical Exam  Vitals reviewed. Constitutional:       Appearance: Normal appearance. He is normal weight. HENT:      Head: Normocephalic and atraumatic. Eyes:      Extraocular Movements: Extraocular movements intact. Pupils: Pupils are equal, round, and reactive to light. Cardiovascular:      Rate and Rhythm: Normal rate. Rhythm irregular. Pulses: Normal pulses. Heart sounds: Normal heart sounds. Pulmonary:      Effort: Pulmonary effort is normal.      Breath sounds: Normal breath sounds. Abdominal:      Palpations: Abdomen is soft. Musculoskeletal:         General: Normal range of motion. Cervical back: Normal range of motion and neck supple. Right lower leg: Edema present. Left lower leg: Edema present. Comments: trace   Skin:     General: Skin is warm and dry. Neurological:      General: No focal deficit present. Mental Status: He is alert and oriented to person, place, and time. Mental status is at baseline.    Psychiatric: with Dr. Sarahy Hernandez: [phone]      I appreciate the opportunity of cooperating in the care of this individual.    Adelia Goddard, SHAMAR - CNP, CNP, 1/14/2022,12:59 PM

## 2022-01-14 NOTE — TELEPHONE ENCOUNTER
CPT: [id number], [id number]  BODY PART: right arm  STATUS: outpatient  AUTHORIZATION: NPR    Per Jeanmarie Fish, [address]

## 2022-01-18 ENCOUNTER — OFFICE VISIT (OUTPATIENT)
Dept: CARDIOLOGY CLINIC | Age: 72
End: 2022-01-18
Payer: COMMERCIAL

## 2022-01-18 VITALS
WEIGHT: 312.1 LBS | HEIGHT: 70 IN | HEART RATE: 65 BPM | BODY MASS INDEX: 44.68 KG/M2 | SYSTOLIC BLOOD PRESSURE: 110 MMHG | OXYGEN SATURATION: 97 % | DIASTOLIC BLOOD PRESSURE: 72 MMHG

## 2022-01-18 DIAGNOSIS — I10 ESSENTIAL HYPERTENSION: ICD-10-CM

## 2022-01-18 DIAGNOSIS — I48.20 CHRONIC ATRIAL FIBRILLATION (HCC): Primary | ICD-10-CM

## 2022-01-18 DIAGNOSIS — I25.10 ATHEROSCLEROSIS OF NATIVE CORONARY ARTERY OF NATIVE HEART WITHOUT ANGINA PECTORIS: ICD-10-CM

## 2022-01-18 PROCEDURE — 99214 OFFICE O/P EST MOD 30 MIN: CPT | Performed by: NURSE PRACTITIONER

## 2022-01-18 PROCEDURE — 93000 ELECTROCARDIOGRAM COMPLETE: CPT | Performed by: NURSE PRACTITIONER

## 2022-01-18 ASSESSMENT — ENCOUNTER SYMPTOMS
SHORTNESS OF BREATH: 1
GASTROINTESTINAL NEGATIVE: 1

## 2022-01-18 NOTE — PATIENT INSTRUCTIONS
Instructions:   1. Medications: continue current meds  2. Labs: done per PCP  3. Referrals: none  4. Lifestyle Recommendations: Weigh yourself every day in the morning after urination, call Zahra Birmingham if wt increases 2-3lb in one day or 5lb in one week, Limit sodium to 2000mg/day and fluids to 2L or 64oz/day.    5. Follow up: 6months with Dr. Sarahy Hernandez: [phone]

## 2022-01-19 ENCOUNTER — TELEPHONE (OUTPATIENT)
Dept: CARDIOLOGY CLINIC | Age: 72
End: 2022-01-19

## 2022-01-19 ENCOUNTER — OFFICE VISIT (OUTPATIENT)
Dept: ORTHOPEDIC SURGERY | Age: 72
End: 2022-01-19
Payer: COMMERCIAL

## 2022-01-19 VITALS — HEIGHT: 70 IN | BODY MASS INDEX: 44.5 KG/M2 | RESPIRATION RATE: 16 BRPM | WEIGHT: 310.8 LBS

## 2022-01-19 DIAGNOSIS — M17.0 ARTHRITIS OF BOTH KNEES: Primary | ICD-10-CM

## 2022-01-19 PROCEDURE — 99214 OFFICE O/P EST MOD 30 MIN: CPT | Performed by: ORTHOPAEDIC SURGERY

## 2022-01-19 NOTE — PROGRESS NOTES
ORTHOPAEDIC NEW PATIENT NOTE    Chief Complaint   Patient presents with    Knee Pain     bilat knee pain right hurts worse        HPI  1/19/22  80 y.o. male seen for evaluation of bilateral knee pain:  Previous patient of Dr Dorys Zhang  Onset chronic/years  Injury/trauma none  History of symptoms as above  Pain is located diffuse bilateral knees  Right worse than left  Worse with activity  Better with rest, prior knee injections  No prior PT  Pain rated 1/10 today    ROS  + weight gain  No N/T    No Known Allergies     Current Outpatient Medications   Medication Sig Dispense Refill    glimepiride (AMARYL) 1 MG tablet Take 1 mg by mouth every morning (before breakfast)      metoprolol tartrate (LOPRESSOR) 25 MG tablet Take 1 tablet by mouth 2 times daily 180 tablet 3    pantoprazole (PROTONIX) 40 MG tablet TAKE 1 TABLET(40 MG) BY MOUTH DAILY 90 tablet 0    spironolactone (ALDACTONE) 25 MG tablet TAKE 1 TABLET BY MOUTH DAILY 90 tablet 3    dilTIAZem (CARDIZEM CD) 240 MG extended release capsule TAKE 1 CAPSULE BY MOUTH DAILY 90 capsule 3    furosemide (LASIX) 20 MG tablet Take 1 tablet by mouth daily Or as directed 90 tablet 3    warfarin (COUMADIN) 5 MG tablet One tab daily, as directed  On Saturday 7/13 and Sunday 7/14 take only 1/2 tabs. Get INR on Monday 7/15 (Patient taking differently: Take 4 mg by mouth daily Managed by [facility]) 30 tablet 3    zolpidem (AMBIEN) 10 MG tablet nightly as needed . 2    atorvastatin (LIPITOR) 10 MG tablet Take 1 tablet by mouth daily. 30 tablet 0    aspirin EC 81 MG EC tablet Take 1 tablet by mouth daily. 30 tablet 3     No current facility-administered medications for this visit.        Past Medical History:   Diagnosis Date    Arthritis     Atrial fibrillation ([facility] [address]     CAD (coronary artery disease)     Cancer (HCC)     skin    Diabetes mellitus ([facility] [address]     Hypercholesteremia     Hypertension     Obesity     Sleep apnea     does not use Cpap machine at present    Ulcer of pyloric antrum 2/2014    anemia/GI bleed while on NSAIDS    Wears glasses         Past Surgical History:   Procedure Laterality Date    COLONOSCOPY      CYST REMOVAL      from back side--fatty lipoma    DIAGNOSTIC CARDIAC CATH LAB PROCEDURE  8/24/06    KNEE SURGERY      arthroscopy bilateral    UPPER GASTROINTESTINAL ENDOSCOPY  02/07/14    EGD/Colonoscopy with biopsy of ulcer    UPPER GASTROINTESTINAL ENDOSCOPY  03/19/2018    with bx       Family History   Problem Relation Age of Onset    Heart Disease Mother     Cancer Father     Cancer Sister         breast       Social History     Socioeconomic History    Marital status:      Spouse name: Not on file    Number of children: Not on file    Years of education: Not on file    Highest education level: Not on file   Occupational History    Not on file   Tobacco Use    Smoking status: Never Smoker    Smokeless tobacco: Never Used   Vaping Use    Vaping Use: Never used   Substance and Sexual Activity    Alcohol use: Yes     Alcohol/week: 1.0 standard drink     Types: 1 Cans of beer per week     Comment: seldom    Drug use: Never    Sexual activity: Yes     Partners: Female   Other Topics Concern    Not on file   Social History Narrative    Not on file     Social Determinants of Health     Financial Resource Strain:     Difficulty of Paying Living Expenses: Not on file   Food Insecurity:     Worried About Running Out of Food in the Last Year: Not on file    Lorie of Food in the Last Year: Not on file   Transportation Needs:     Lack of Transportation (Medical): Not on file    Lack of Transportation (Non-Medical):  Not on file   Physical Activity:     Days of Exercise per Week: Not on file    Minutes of Exercise per Session: Not on file   Stress:     Feeling of Stress : Not on file   Social Connections:     Frequency of Communication with Friends and Family: Not on file    Frequency of Social Gatherings with Arthritis of both knees  XR KNEE LEFT (3 VIEWS)    XR KNEE RIGHT (3 VIEWS)    DUROLANE INJECTION (For Auth/Precert)    [facility]   2. Adult BMI 45.0-49.9 kg/sq m ([address]  [facility]           Procedures    DUROLANE INJECTION (For Auth/Precert)       Conservative treatment of knee symptoms/arthritis, according to AAOS Clinical Practice Guidelines:  1)  Recommend oral or topical NSAIDs to reduce pain and swelling. 2)  Recommend acetaminophen to reduce pain. 3)  Oral narcotics, including tramadol, result in a significant increase of adverse events and are not effective at improving pain or function for treatment of osteoarthritis of the knee. 4)  Recommend weight loss to reduce stresses on the knee, particularly the patellofemoral compartment which sees up to 6x body weight. Weight loss referral given. 5)  Physical therapy referral with transition to home program, focusing on strengthening, low impact aerobic exercises, and neuromuscular education (i.e. balance, agility, coordination). 6)  Intra-articular corticosteroid injections are an option. Informed patient corticosteroid injections can be performed every 4 months as needed. 7)  Evidence for intra-articular hyaluronic acid injections (viscosupplementation) is not as strong, but may benefit a subset of patients who have failed other options. Informed patient viscosupplementation can be performed every 6 months as needed. 8)  Bracing and cane use can improve pain and function. Although not specifically listed in the CPGs, ice therapy and topical patches such as Salonpas are good options as well. Intra-articular knee injection:  Too soon for another corticosteroid injection.   Will submit for visco approval.      Caron Cameron MD

## 2022-01-19 NOTE — TELEPHONE ENCOUNTER
Called patient and relayed message per Angel Luis Crowder with verbal understanding and encouraged to call office with any questions or concerns.

## 2022-01-20 DIAGNOSIS — G56.03 BILATERAL CARPAL TUNNEL SYNDROME: ICD-10-CM

## 2022-01-20 LAB — SARS-COV-2: NOT DETECTED

## 2022-01-24 ENCOUNTER — ANESTHESIA EVENT (OUTPATIENT)
Dept: OPERATING ROOM | Age: 72
End: 2022-01-24
Payer: COMMERCIAL

## 2022-01-25 ENCOUNTER — HOSPITAL ENCOUNTER (OUTPATIENT)
Age: 72
Setting detail: OUTPATIENT SURGERY
Discharge: HOME | End: 2022-01-25
Attending: ORTHOPAEDIC SURGERY | Admitting: ORTHOPAEDIC SURGERY
Payer: COMMERCIAL

## 2022-01-25 ENCOUNTER — ANESTHESIA (OUTPATIENT)
Dept: OPERATING ROOM | Age: 72
End: 2022-01-25
Payer: COMMERCIAL

## 2022-01-25 VITALS
RESPIRATION RATE: 16 BRPM | TEMPERATURE: 97.2 F | BODY MASS INDEX: 44.38 KG/M2 | WEIGHT: 310 LBS | HEART RATE: 59 BPM | SYSTOLIC BLOOD PRESSURE: 130 MMHG | OXYGEN SATURATION: 94 % | DIASTOLIC BLOOD PRESSURE: 63 MMHG | HEIGHT: 70 IN

## 2022-01-25 VITALS
DIASTOLIC BLOOD PRESSURE: 60 MMHG | RESPIRATION RATE: 32 BRPM | SYSTOLIC BLOOD PRESSURE: 110 MMHG | TEMPERATURE: 96.8 F | OXYGEN SATURATION: 93 %

## 2022-01-25 LAB
GLUCOSE BLD-MCNC: 118 MG/DL (ref 70–99)
GLUCOSE BLD-MCNC: 123 MG/DL (ref 70–99)
PERFORMED ON: ABNORMAL
PERFORMED ON: ABNORMAL

## 2022-01-25 PROCEDURE — 64718 REVISE ULNAR NERVE AT ELBOW: CPT | Performed by: ORTHOPAEDIC SURGERY

## 2022-01-25 PROCEDURE — 2580000003 HC RX 258: Performed by: ANESTHESIOLOGY

## 2022-01-25 PROCEDURE — A4217 STERILE WATER/SALINE, 500 ML: HCPCS | Performed by: ORTHOPAEDIC SURGERY

## 2022-01-25 PROCEDURE — 2500000003 HC RX 250 WO HCPCS: Performed by: ORTHOPAEDIC SURGERY

## 2022-01-25 PROCEDURE — 64721 CARPAL TUNNEL SURGERY: CPT | Performed by: ORTHOPAEDIC SURGERY

## 2022-01-25 PROCEDURE — 3700000001 HC ADD 15 MINUTES (ANESTHESIA): Performed by: ORTHOPAEDIC SURGERY

## 2022-01-25 PROCEDURE — 7100000001 HC PACU RECOVERY - ADDTL 15 MIN: Performed by: ORTHOPAEDIC SURGERY

## 2022-01-25 PROCEDURE — 3700000000 HC ANESTHESIA ATTENDED CARE: Performed by: ORTHOPAEDIC SURGERY

## 2022-01-25 PROCEDURE — 7100000010 HC PHASE II RECOVERY - FIRST 15 MIN: Performed by: ORTHOPAEDIC SURGERY

## 2022-01-25 PROCEDURE — 2709999900 HC NON-CHARGEABLE SUPPLY: Performed by: ORTHOPAEDIC SURGERY

## 2022-01-25 PROCEDURE — 7100000000 HC PACU RECOVERY - FIRST 15 MIN: Performed by: ORTHOPAEDIC SURGERY

## 2022-01-25 PROCEDURE — 7100000011 HC PHASE II RECOVERY - ADDTL 15 MIN: Performed by: ORTHOPAEDIC SURGERY

## 2022-01-25 PROCEDURE — 2500000003 HC RX 250 WO HCPCS

## 2022-01-25 PROCEDURE — 6360000002 HC RX W HCPCS

## 2022-01-25 PROCEDURE — 3600000005 HC SURGERY LEVEL 5 BASE: Performed by: ORTHOPAEDIC SURGERY

## 2022-01-25 PROCEDURE — 3600000015 HC SURGERY LEVEL 5 ADDTL 15MIN: Performed by: ORTHOPAEDIC SURGERY

## 2022-01-25 PROCEDURE — 2580000003 HC RX 258: Performed by: ORTHOPAEDIC SURGERY

## 2022-01-25 RX ORDER — SODIUM CHLORIDE 0.9 % (FLUSH) 0.9 %
5-40 SYRINGE (ML) INJECTION PRN
Status: DISCONTINUED | OUTPATIENT
Start: 2022-01-25 | End: 2022-01-25 | Stop reason: HOSPADM

## 2022-01-25 RX ORDER — FENTANYL CITRATE 50 UG/ML
INJECTION, SOLUTION INTRAMUSCULAR; INTRAVENOUS PRN
Status: DISCONTINUED | OUTPATIENT
Start: 2022-01-25 | End: 2022-01-25 | Stop reason: SDUPTHER

## 2022-01-25 RX ORDER — ONDANSETRON 2 MG/ML
INJECTION INTRAMUSCULAR; INTRAVENOUS PRN
Status: DISCONTINUED | OUTPATIENT
Start: 2022-01-25 | End: 2022-01-25 | Stop reason: SDUPTHER

## 2022-01-25 RX ORDER — LIDOCAINE HYDROCHLORIDE 20 MG/ML
INJECTION, SOLUTION EPIDURAL; INFILTRATION; INTRACAUDAL; PERINEURAL PRN
Status: DISCONTINUED | OUTPATIENT
Start: 2022-01-25 | End: 2022-01-25 | Stop reason: SDUPTHER

## 2022-01-25 RX ORDER — MAGNESIUM HYDROXIDE 1200 MG/15ML
LIQUID ORAL CONTINUOUS PRN
Status: COMPLETED | OUTPATIENT
Start: 2022-01-25 | End: 2022-01-25

## 2022-01-25 RX ORDER — PROPOFOL 10 MG/ML
INJECTION, EMULSION INTRAVENOUS PRN
Status: DISCONTINUED | OUTPATIENT
Start: 2022-01-25 | End: 2022-01-25 | Stop reason: SDUPTHER

## 2022-01-25 RX ORDER — BUPIVACAINE HYDROCHLORIDE 5 MG/ML
INJECTION, SOLUTION EPIDURAL; INTRACAUDAL
Status: COMPLETED | OUTPATIENT
Start: 2022-01-25 | End: 2022-01-25

## 2022-01-25 RX ORDER — DEXAMETHASONE SODIUM PHOSPHATE 4 MG/ML
INJECTION, SOLUTION INTRA-ARTICULAR; INTRALESIONAL; INTRAMUSCULAR; INTRAVENOUS; SOFT TISSUE PRN
Status: DISCONTINUED | OUTPATIENT
Start: 2022-01-25 | End: 2022-01-25 | Stop reason: SDUPTHER

## 2022-01-25 RX ORDER — SODIUM CHLORIDE 9 MG/ML
25 INJECTION, SOLUTION INTRAVENOUS PRN
Status: DISCONTINUED | OUTPATIENT
Start: 2022-01-25 | End: 2022-01-25 | Stop reason: HOSPADM

## 2022-01-25 RX ORDER — SODIUM CHLORIDE 0.9 % (FLUSH) 0.9 %
5-40 SYRINGE (ML) INJECTION EVERY 12 HOURS SCHEDULED
Status: DISCONTINUED | OUTPATIENT
Start: 2022-01-25 | End: 2022-01-25 | Stop reason: HOSPADM

## 2022-01-25 RX ORDER — SODIUM CHLORIDE 9 MG/ML
INJECTION, SOLUTION INTRAVENOUS CONTINUOUS
Status: DISCONTINUED | OUTPATIENT
Start: 2022-01-25 | End: 2022-01-25 | Stop reason: HOSPADM

## 2022-01-25 RX ADMIN — DEXAMETHASONE SODIUM PHOSPHATE 8 MG: 4 INJECTION, SOLUTION INTRAMUSCULAR; INTRAVENOUS at 09:33

## 2022-01-25 RX ADMIN — FENTANYL CITRATE 50 MCG: 50 INJECTION INTRAMUSCULAR; INTRAVENOUS at 09:51

## 2022-01-25 RX ADMIN — SODIUM CHLORIDE: 9 INJECTION, SOLUTION INTRAVENOUS at 08:36

## 2022-01-25 RX ADMIN — PROPOFOL 200 MG: 10 INJECTION, EMULSION INTRAVENOUS at 09:28

## 2022-01-25 RX ADMIN — LIDOCAINE HYDROCHLORIDE 100 MG: 20 INJECTION, SOLUTION EPIDURAL; INFILTRATION; INTRACAUDAL; PERINEURAL at 09:28

## 2022-01-25 RX ADMIN — FENTANYL CITRATE 25 MCG: 50 INJECTION INTRAMUSCULAR; INTRAVENOUS at 09:41

## 2022-01-25 RX ADMIN — ONDANSETRON 4 MG: 2 INJECTION INTRAMUSCULAR; INTRAVENOUS at 09:33

## 2022-01-25 RX ADMIN — PROPOFOL 100 MG: 10 INJECTION, EMULSION INTRAVENOUS at 09:30

## 2022-01-25 RX ADMIN — FENTANYL CITRATE 25 MCG: 50 INJECTION INTRAMUSCULAR; INTRAVENOUS at 09:37

## 2022-01-25 ASSESSMENT — PULMONARY FUNCTION TESTS
PIF_VALUE: 17
PIF_VALUE: 14
PIF_VALUE: 17
PIF_VALUE: 17
PIF_VALUE: 15
PIF_VALUE: 14
PIF_VALUE: 0
PIF_VALUE: 14
PIF_VALUE: 17
PIF_VALUE: 15
PIF_VALUE: 15
PIF_VALUE: 2
PIF_VALUE: 28
PIF_VALUE: 0
PIF_VALUE: 17
PIF_VALUE: 3
PIF_VALUE: 16
PIF_VALUE: 2
PIF_VALUE: 2
PIF_VALUE: 16
PIF_VALUE: 17
PIF_VALUE: 7
PIF_VALUE: 17
PIF_VALUE: 15
PIF_VALUE: 14
PIF_VALUE: 17
PIF_VALUE: 14
PIF_VALUE: 17
PIF_VALUE: 14
PIF_VALUE: 15

## 2022-01-25 ASSESSMENT — PAIN SCALES - GENERAL
PAINLEVEL_OUTOF10: 0

## 2022-01-25 ASSESSMENT — PAIN - FUNCTIONAL ASSESSMENT: PAIN_FUNCTIONAL_ASSESSMENT: 0-10

## 2022-01-25 ASSESSMENT — ENCOUNTER SYMPTOMS: SHORTNESS OF BREATH: 0

## 2022-01-25 NOTE — ANESTHESIA PRE PROCEDURE
[facility] Department of Anesthesiology  Pre-Anesthesia Evaluation/Consultation       Name:  Amanda Rush  : 1950  Age:  80 y.o.                                            MRN:  [id number]  Date: 2022           Surgeon: Surgeon(s):  Lindsay Huston MD    Procedure: Procedure(s):  RIGHT ULNAR NERVE DECOMPRESSION (AT ELBOW)  RIGHT CARPAL TUNNEL RELEASE     No Known Allergies  Patient Active Problem List   Diagnosis    Essential hypertension    Coronary atherosclerosis of native coronary artery    Sleep apnea    Long term current use of anticoagulant therapy    Anemia    GI bleed    Pain in joint, lower leg    Primary osteoarthritis of right knee    Chondromalacia of right patellofemoral joint    Status post arthroscopic surgery of right knee chondroplasty, synovectomy, partial medial meniscectomy on 2006 (3A patellofemoral and medial compartment)    Morbid obesity with BMI of 40.0-44.9, adult ([address]    Chronic pain of right knee    Upper GI bleed    Supratherapeutic INR    Chronic atrial fibrillation ([address]    Pure hypercholesterolemia    Diabetes mellitus ([address]    Cellulitis    Bilateral carpal tunnel syndrome    Cubital tunnel syndrome, bilateral     Past Medical History:   Diagnosis Date    Arthritis     Atrial fibrillation ([address]     CAD (coronary artery disease)     Cancer ([address]     skin    Diabetes mellitus ([address]     Hypercholesteremia     Hypertension     Obesity     Sleep apnea     does not use Cpap machine at present    Ulcer of pyloric antrum 2014    anemia/GI bleed while on NSAIDS    Wears glasses      Past Surgical History:   Procedure Laterality Date    COLONOSCOPY      CYST REMOVAL      from back side--fatty lipoma    DIAGNOSTIC CARDIAC CATH LAB PROCEDURE  06    KNEE SURGERY      arthroscopy bilateral    UPPER GASTROINTESTINAL ENDOSCOPY  14    EGD/Colonoscopy with biopsy of ulcer    UPPER GASTROINTESTINAL ENDOSCOPY  2018    with bx     Social History     Tobacco Use    Smoking status: Never Smoker    Smokeless tobacco: Never Used   Vaping Use    Vaping Use: Never used   Substance Use Topics    Alcohol use: Yes     Alcohol/week: 1.0 standard drink     Types: 1 Cans of beer per week     Comment: seldom    Drug use: Never     Medications  No current facility-administered medications on file prior to encounter. Current Outpatient Medications on File Prior to Encounter   Medication Sig Dispense Refill    glimepiride (AMARYL) 1 MG tablet Take 1 mg by mouth every morning (before breakfast)      metoprolol tartrate (LOPRESSOR) 25 MG tablet Take 1 tablet by mouth 2 times daily 180 tablet 3    pantoprazole (PROTONIX) 40 MG tablet TAKE 1 TABLET(40 MG) BY MOUTH DAILY 90 tablet 0    spironolactone (ALDACTONE) 25 MG tablet TAKE 1 TABLET BY MOUTH DAILY 90 tablet 3    dilTIAZem (CARDIZEM CD) 240 MG extended release capsule TAKE 1 CAPSULE BY MOUTH DAILY 90 capsule 3    furosemide (LASIX) 20 MG tablet Take 1 tablet by mouth daily Or as directed 90 tablet 3    warfarin (COUMADIN) 5 MG tablet One tab daily, as directed  On Saturday 7/13 and Sunday 7/14 take only 1/2 tabs. Get INR on Monday 7/15 (Patient taking differently: Take 4 mg by mouth daily Managed by [facility]) 30 tablet 3    zolpidem (AMBIEN) 10 MG tablet nightly as needed . 2    atorvastatin (LIPITOR) 10 MG tablet Take 1 tablet by mouth daily. 30 tablet 0    aspirin EC 81 MG EC tablet Take 1 tablet by mouth daily.  30 tablet 3     Current Facility-Administered Medications   Medication Dose Route Frequency Provider Last Rate Last Admin    0.9 % sodium chloride infusion   IntraVENous Continuous Lakshmi Brannon MD 75 mL/hr at 01/25/22 0836 New Bag at 01/25/22 0836    sodium chloride flush 0.9 % injection 5-40 mL  5-40 mL IntraVENous 2 times per day Lakshmi Brannon MD        sodium chloride flush 0.9 % injection 5-40 mL  5-40 mL IntraVENous PRN Loraine Mon Lavern Troy MD        0.9 % sodium chloride infusion  25 mL IntraVENous PRN Cassidy Back MD         Vital Signs (Current)   Vitals:    22 1616 22   BP:  125/77   Pulse:  60   Resp:  18   Temp:  97 °F (36.1 °C)   TempSrc:  Temporal   SpO2:  95%   Weight: (!) 310 lb (140.6 kg)    Height: 5' 10\" (1.778 m)                                             Vital Signs Statistics (for past 48 hrs)     Temp  Av °F (36.1 °C)  Min: 97 °F (36.1 °C)   Min taken time: 22  Max: 97 °F (36.1 °C)   Max taken time: 22  Pulse  Av  Min: 61   Min taken time: 22  Max: 61   Max taken time: 22  Resp  Av  Min: 25   Min taken time: 22  Max: 25   Max taken time: 22  BP  Min: 125/77   Min taken time: 22  Max: 125/77   Max taken time: 22  SpO2  Av %  Min: 95 %   Min taken time: 22  Max: 95 %   Max taken time: 22  BP Readings from Last 3 Encounters:   22 125/77   22 110/72   21 106/70       BMI  Body mass index is 44.48 kg/m². Estimated body mass index is 44.48 kg/m² as calculated from the following:    Height as of this encounter: 5' 10\" (1.778 m). Weight as of this encounter: 310 lb (140.6 kg).     CBC   Lab Results   Component Value Date    WBC 9.7 2021    RBC 4.51 2021    HGB 12.6 2021    HCT 39.5 2021    MCV 87.6 2021    RDW 15.2 2021     2021     CMP    Lab Results   Component Value Date     2021    K 5.4 2021     2021    CO2 22 2021    BUN 27 2021    CREATININE 1.5 2021    GFRAA 56 2021    GFRAA >60 2012    AGRATIO 1.4 2021    LABGLOM 46 2021    GLUCOSE 77 2021    PROT 6.7 2021    PROT 6.7 10/25/2010    CALCIUM 9.1 2021    BILITOT 0.7 2021    ALKPHOS 87 2021    AST 21 2021    ALT 8 2021     BMP    Lab Results Component Value Date     07/06/2021    K 5.4 07/06/2021     07/06/2021    CO2 22 07/06/2021    BUN 27 07/06/2021    CREATININE 1.5 07/06/2021    CALCIUM 9.1 07/06/2021    GFRAA 56 07/06/2021    GFRAA >60 02/11/2012    LABGLOM 46 07/06/2021    GLUCOSE 77 07/06/2021     POCGlucose  No results for input(s): GLUCOSE in the last 72 hours. Coags    Lab Results   Component Value Date    PROTIME 26.7 07/11/2019    PROTIME 23.5 08/21/2018    INR 3.7 12/07/2020    INR 2.30 05/14/2020    APTT 38.2 96/82/8931     HCG (If Applicable) No results found for: PREGTESTUR, PREGSERUM, HCG, HCGQUANT   ABGs No results found for: PHART, PO2ART, FUC9ACB, IOS5IQG, BEART, J2DFNFBU   Type & Screen (If Applicable)  No results found for: LABABO, LABRH                         BMI: Wt Readings from Last 3 Encounters:       NPO Status:   Date of last liquid consumption: 01/24/22   Time of last liquid consumption: 2300   Date of last solid food consumption: 01/24/22      Time of last solid consumption: 2300       Anesthesia Evaluation  Patient summary reviewed and Nursing notes reviewed  Airway: Mallampati: III  TM distance: >3 FB   Neck ROM: full  Mouth opening: > = 3 FB Dental:          Pulmonary:   (+) sleep apnea:      (-) COPD, asthma and shortness of breath                           Cardiovascular:    (+) hypertension:, CAD: no interval change, dysrhythmias: atrial fibrillation, hyperlipidemia    (-) valvular problems/murmurs, past MI, CABG/stent and  angina                Neuro/Psych:   (+) neuromuscular disease:,    (-) seizures, TIA and CVA           GI/Hepatic/Renal:   (+) PUD,      (-) GERD, liver disease and no renal disease       Endo/Other:    (+) DiabetesType II DM, , : arthritis:., malignancy/cancer (skin). Abdominal:             Vascular: negative vascular ROS.          Other Findings:             Anesthesia Plan      general     ASA 3     (I discussed with the patient the risks and benefits of PIV, general anesthesia, IV Narcotics, PACU. All questions were answered the patient agrees with the plan.)  Induction: intravenous. Anesthetic plan and risks discussed with patient. Plan discussed with CRNA. This pre-anesthesia assessment may be used as a history and physical.    DOS STAFF ADDENDUM:    Pt seen and examined, chart reviewed (including anesthesia, drug and allergy history). No interval changes to history and physical examination. Anesthetic plan, risks, benefits, alternatives, and personnel involved discussed with patient. Patient verbalized an understanding and agrees to proceed.       Chidi Stafford MD  January 25, 2022  8:41 AM

## 2022-01-25 NOTE — PROGRESS NOTES
C-Difficile admission screening and protocol:     * Admitted with diarrhea? YES____    NO___X__     *Prior history of C-Diff. In last 3 months? YES____   NO__X__     *Antibiotic use in the past 6-8 weeks? NO___X___YES______                 If yes which  ANTIBIOTIC AND REASON______     *Prior hospitalization or nursing home in the last month?  YES____   NO___X_
Covid testing to be done @ [facility] on either 1/19/22 or 1/20/22  If positive---Pt instructed to notify MD ASAP   If negative--pt was instructed to bring results DOP/DOS
Discharge instructions reviewed with pt and wife, verbalized understanding. VSS. IV dc'd. Denies pain. Pt up getting dressed, tolerating well. Ready for discharge.
H.P for DOS is in epic under Media dated 1/20/22    Cardac clear in epic under chart review- notes for 1/25- DOS
Kinga Vergara :  1950    DOS:  22    appt with Dr. Susan Singh on 22 for medical clearance--should be in epic  appt with Dr. Jack Whitaker on 22 for cardiac clearance
Pt 02 sat 87-95% on room air. Uses IS with fair effort - will take IS home. Dr. Carrillo Section updated on pt status - 02 and IS use. OK with Dr. Carrillo Section to transfer to phase 2 care. Pt denies pain. To phase 2.
Pt arrived to Phase 2 from the PACU alert and oriented. Denies pain at this time. Dressing dry and intact. VSS. Tolerating oral intake. Family at bedside. Sitting up in chair. Will continue to monitor.
To pacu from OR. Pt asleep. Dressing to right arm and hand dry and intact. Fingers to right hand warm with brisk cap refill. IV infusing. Monitor in a fib.
toes      For your safety, please do not wear any jewelry or body piercing's on the day of surgery. All jewelry must be removed. If you have dentures, they will be removed before going to operating room. For your convenience, we will provide you with a container. If you wear contact lenses or glasses, they will be removed, please bring a case for them. If you have a living will and a durable power of  for healthcare, please bring in a copy. As part of our patient safety program to minimize surgical site infections, we ask you to do the following:    · Please notify your surgeon if you develop any illness between         now and the  day of your surgery. · This includes a cough, cold, fever, sore throat, nausea,         or vomiting, and diarrhea, etc.  ·  Please notify your surgeon if you experience dizziness, shortness         of breath or blurred vision between now and the time of your surgery. Do not shave your operative site 96 hours prior to surgery. For face and neck surgery, men may use an electric razor 48 hours   prior to surgery. You may shower the night before surgery or the morning of   your surgery with an antibacterial soap. You will need to bring a photo ID and insurance card    [facility] has an onsite pharmacy, would you like to utilize our pharmacy     If you will be staying overnight and use a C-pap machine, please bring   your C-pap to hospital     Our goal is to provide you with excellent care, therefore, visitors will be limited to two(2) in the room at a time so that we may focus on providing this care for you. Please contact pre-admission testing if you have any further questions. [facility] phone number:  [phone] [facility] fax number:  [phone]  Please note these are generalized instructions for all surgical cases, you may be provided with more specific instructions according to your surgery.

## 2022-01-25 NOTE — H&P
Pre-operative Update of H&P:    I  have seen & examined Mr. Fay Joya related solely to his hand and upper extremity conditions, prior to the scheduled procedure on the date of his surgery. The indications for the planned surgical procedure & and his upper-extremity condition are unchanged.

## 2022-01-25 NOTE — ANESTHESIA POSTPROCEDURE EVALUATION
Department of Anesthesiology  Postprocedure Note    Patient: Andreas Granger  MRN: [id number]  YOB: 1950  Date of evaluation: 1/25/2022  Time:  11:52 AM     Procedure Summary     Date: 01/25/22 Room / Location:  [facility]  / [facility]    Anesthesia Start: 6334 Anesthesia Stop: 1001    Procedures:       RIGHT ULNAR NERVE DECOMPRESSION (AT ELBOW) (Right Elbow)      RIGHT CARPAL TUNNEL RELEASE (Right Wrist) Diagnosis:       Right carpal tunnel syndrome      Cubital tunnel syndrome, right      Ulnar nerve entrapment at elbow, right      (RIGHT CUBITAL TUNNEL SYNDROME, ULNAR NERVE ENTRAPMENT AT ELBOW, RIGHT CARPAL TUNNEL SYNDROME)    Surgeons: Deepthi Cardenas MD Responsible Provider: Sangeeta Navarrete MD    Anesthesia Type: general ASA Status: 3          Anesthesia Type: general    Mei Phase I: Mei Score: 9    Mei Phase II: Mei Score: 10    Last vitals: Reviewed and per EMR flowsheets.        Anesthesia Post Evaluation    Patient location during evaluation: PACU  Level of consciousness: awake and alert  Airway patency: patent  Nausea & Vomiting: no nausea and no vomiting  Complications: no  Cardiovascular status: blood pressure returned to baseline  Respiratory status: acceptable  Hydration status: euvolemic  Comments: Postoperative Anesthesia Note    Name:    Andreas Granger  MRN:      [id number]    Patient Vitals in the past 12 hrs:  01/25/22 1105, BP:130/63, Pulse:59, Resp:16, SpO2:94 %  01/25/22 1032, BP:107/61, Temp:97.2 °F (36.2 °C), Temp src:Temporal, Pulse:72, Resp:16, SpO2:90 %  01/25/22 1026, Temp:97 °F (36.1 °C), Temp src:Temporal, Pulse:68, Resp:18, SpO2:91 %  01/25/22 1018, BP:128/65, Pulse:61, Resp:26, SpO2:(!) 87 %  01/25/22 1014, BP:130/78, Pulse:64, Resp:19, SpO2:93 %  01/25/22 1008, BP:127/85, Pulse:68, Resp:14, SpO2:(!) 89 %  01/25/22 1004, BP:122/73, Pulse:67, Resp:19, SpO2:(!) 88 %  01/25/22 0958, BP:132/65, Temp:97.9 °F (36.6 °C), Temp src:Temporal, Pulse:70, Resp:15, SpO2:93 %  01/25/22 0827, BP:125/77, Temp:97 °F (36.1 °C), Temp src:Temporal, Pulse:60, Resp:18, SpO2:95 %     LABS:    CBC  Lab Results       Component                Value               Date/Time                  WBC                      9.7                 02/05/2021 03:35 PM        HGB                      12.6 (L)            02/05/2021 03:35 PM        HCT                      39.5 (L)            02/05/2021 03:35 PM        PLT                      271                 02/05/2021 03:35 PM   RENAL  Lab Results       Component                Value               Date/Time                  NA                       140                 07/06/2021 11:12 AM        K                        5.4 (H)             07/06/2021 11:12 AM        CL                       105                 07/06/2021 11:12 AM        CO2                      22                  07/06/2021 11:12 AM        BUN                      27 (H)              07/06/2021 11:12 AM        CREATININE               1.5 (H)             07/06/2021 11:12 AM        GLUCOSE                  77                  07/06/2021 11:12 AM   COAGS  Lab Results       Component                Value               Date/Time                  PROTIME                  26.7 (H)            07/11/2019 05:54 AM        PROTIME                  23.5                08/21/2018 03:11 PM        INR                      3.7                 12/07/2020 08:23 AM        INR                      2.30                05/14/2020 12:00 AM        APTT                     38.2 (H)            04/05/2018 05:27 PM     Intake & Output:  @99WJMO@    Nausea & Vomiting:  No    Level of Consciousness:  Awake    Pain Assessment:  Adequate analgesia    Anesthesia Complications:  No apparent anesthetic complications    SUMMARY      Vital signs stable  OK to discharge from Stage I post anesthesia care.   Care transferred from Anesthesiology department on discharge from perioperative area

## 2022-01-25 NOTE — OP NOTE
OPERATIVE REPORT          Patient:  Susana Yung    YOB: 1950  Date of Service:  1/25/2022   [facility]  [address] [facility]      Preoperative Diagnoses:  Right  carpal tunnel syndrome & Right cubital tunnel syndrome     Postoperative Diagnoses:  Same    Procedures:   Right  Carpal Tunnel Release & Right Ulnar Nerve decompression at the Cubital Tunnel     Surgeon:    Deisi Bell. Barrington Rizvi MD    Surgical Assistant:    LIGIA Houser Assistant    Anesthesia:   General                                   Blood Loss:    Minimal         Complications:    None                          Tourniquet Time:   6 minutes      Indications: Mr. Susana Yung is a 80y.o.  year old male with Right  carpal tunnel syndrome & Right cubital tunnel syndrome . I have discussed preoperatively with him  the complications, limitations, expectations, alternatives and risk of the planned surgical care which he understood & all of his  questions were answered. Mr. Susana Yung has provided written informed consent to proceed. After written consent was obtained and the proper operative sites were identified and marked, Mr. Susana Yung was brought to the operating room and placed in the supine position on the operating room table with the Right arm extended upon a hand table. General anesthesia was induced & the Right upper extremity was prepped and draped in the usual sterile fashion. Procedure:   After Esmarch exsanguination, the pneuomo-tourniquet was inflated to 250 millimeters of Mercury about the arm. Attention was turned to the medial elbow. A curvilinear incision was fashioned over the posterior medial aspect of the elbow centered between the medial epicondyle and the tip of the olecranon. Dissection was carried carefully through the subcutaneous tissue identifying and protecting the superficial neurovascular structures.   The cubital tunnel was identified and cleared of overlying soft tissue. Careful incision allowed exposure of the ulnar nerve. The nerve was traced proximally and circumferential control of the nerve was obtained. It was dissected proximally to the level of the connection between the biceps and triceps muscles, approximately 3 cm proximal to the medial epicondyle. It was carefully mobilized from the medial intermuscular septum. It was traced distally, being completely freed along the course of the cubital tunnel, and was dissected distally to the level of the second motor branch as it split the heads of the FCU muscle. There was a tight fibrous band at the confluence of the heads of the FCU which was carefully divided. Digital palpation revealed that there were no further proximal or distal constriction about the nerve. The Ulnar Nerve was found to be stable in it's groove without tendency toward subluxation or snapping. Attention was turned to the palm. A 2 cm longitudinal incision was fashioned at the base of the palm, paralleling the longitudinal thenar crease. Dissection was carried carefully through the subcutaneous tissue identifying and protecting the neurovascular structures. The palmar fascia was incised longitudinally, exposing the transverse carpal ligament. The transverse carpal ligament was incised from its proximal to distal most extent, under direct visualization. The terminal 2 cm of antebrachial fascia was similarly incised under direct visualization. The contents of the carpal tunnel were inspected and found to be free of mass, lesion or other abnormality. Digital palpation revealed no further constriction about the median nerve. The incisions were all irrigated copiously with sterile saline for irrigation. The pneumo-tourniquet was deflated after a period of 6 minutes elevation & the fingers were immediately pink and well perfused. Hemostasis was easily obtained with direct pressure and electrocautery.   The incisions were closed in layers with interrupted sutures. The wounds were dressed with Adaptic & dry sterile dressings after local anesthetic was instilled for postoperative analgesia. Mr. Ceci Flores was awakened from anesthesia having tolerated the procedure without apparent complication. He was returned to the recovery room in stable condition. At the conclusion of the procedure, all needle, instrument and sponge counts were correct. iDdi Rae MD   1/25/2022, 9:53 AM

## 2022-01-31 ENCOUNTER — OFFICE VISIT (OUTPATIENT)
Dept: ORTHOPEDIC SURGERY | Age: 72
End: 2022-01-31

## 2022-01-31 DIAGNOSIS — Z48.01 ENCOUNTER FOR CHANGE OR REMOVAL OF SURGICAL WOUND DRESSING: Primary | ICD-10-CM

## 2022-01-31 DIAGNOSIS — Z98.890 S/P CUBITAL TUNNEL RELEASE: ICD-10-CM

## 2022-01-31 DIAGNOSIS — Z98.890 S/P CARPAL TUNNEL RELEASE: ICD-10-CM

## 2022-01-31 PROCEDURE — 99024 POSTOP FOLLOW-UP VISIT: CPT | Performed by: PHYSICIAN ASSISTANT

## 2022-01-31 NOTE — PROGRESS NOTES
Patient Name: Veda Lundy  Medical Record Number: [id number]  YOB: 1950  Date of Encounter: 1/31/2022     Chief Complaint   Patient presents with    Post-Op Check     Surgical bangage is bunching up on arm       History of Present Illness:   Mr. Veda Lundy presents today regarding his surgical dressing. Patient had a right cubital and carpal tunnel release with Dr. Sandy Moody on 1/25/2022. He presents today stating the surgical dressing is falling off. He denies increased pain, swelling, redness, warmth, fevers, chills or any other concerns. The patient's past medical history, medications, allergies, family history, social history, and review of systems have been reviewed, and dated and are recorded in the chart under the 'MEDIA\" tab. Physical Exam:    Mr. Veda Lundy appears well, he is in no apparent distress, he demonstrates appropriate mood & affect. He is alert and oriented to person, place and time. There were no vitals taken for this visit. On examination of patient's right arm there is mild postsurgical swelling which is improving as expected. There are no signs of acute infection. Surgical incisions appear to be healing well. Impression:   Diagnosis Orders   1. Encounter for change or removal of surgical wound dressing     2. 1/25/22 RIGHT carpal tunnel release     3. 1/25/22 RIGHT cubital tunnel release         Treatment Plan:    Patient is 6 days postop right carpal and cubital tunnel release with Dr. Sandy Moody on 1/25/2022. He came into the office today stating his surgical dressings were falling off. He is not having increased pain, swelling, redness, warmth, fevers, chills or any other concerns. His surgical incisions are healing well. I did contact Leonidas Ching PA-C who advised on dressing change. This was completed in the office. He was given extra dressings. He was advised to keep the right upper extremity clean and dry.   He has an appointment with Dr. Germán Cartagena on Friday    Katt Graham was informed of the results of any imaging. We discussed treatment options and a time was given to answer questions. A plan was proposed and Katt Graham understand and accepts this course of care. Electronically signed by Castillo Montes PA-C on 8/01/6396  Board Certified [facility]    Please note that portions of this note were completed with a voice recognition program.  Efforts were made to edit the dictations but occasionally words are mis-transcribed.

## 2022-02-07 ENCOUNTER — OFFICE VISIT (OUTPATIENT)
Dept: ORTHOPEDIC SURGERY | Age: 72
End: 2022-02-07

## 2022-02-07 VITALS — WEIGHT: 310 LBS | BODY MASS INDEX: 44.38 KG/M2 | HEIGHT: 70 IN | RESPIRATION RATE: 16 BRPM

## 2022-02-07 DIAGNOSIS — Z98.890 S/P CARPAL TUNNEL RELEASE: ICD-10-CM

## 2022-02-07 DIAGNOSIS — Z98.890 S/P CUBITAL TUNNEL RELEASE: Primary | ICD-10-CM

## 2022-02-07 PROCEDURE — 99024 POSTOP FOLLOW-UP VISIT: CPT | Performed by: ORTHOPAEDIC SURGERY

## 2022-02-07 NOTE — PROGRESS NOTES
Mr. Chantell Kennedy returns today in follow-up of his recent right Ulnar Nerve Decompression (Cubital Tunnel Release) & Carpal Tunnel Release done approximately 2 weeks ago. He has done well noting no discomfort and no other reported complications. He notes pre-operative symptoms to be completely resolved at this time. Physical Exam:  Bandage removed prior to visit, pt had mepitel dressing on elbow and rewrapped wrist.  Skin incision is healing well, without erythema, drainage or sign of infection. Digital range of motion is Full. Wrist range of motion is Full. Elbow range of motion is Full. Sensation is completely resolved in the Median Innervated Digits and Ulnar Innervated Digits. Vascular examination reveals normal, good capillary refill and good color. Swelling is minimal.  Sensory and Motor Median & Ulnar Nerve function is intact. Impression:  Mr. Chantell Kennedy is doing well after recent right Ulnar Nerve Decompression (Cubital Tunnel Release) &  Carpal Tunnel Release. Plan:  Mr. Chantell Kennedy is instructed in work on Active & Passive range of motion of the digits, wrist, & elbow. These modalities were specifically demonstrated to him today. We discussed the appropriateness of gradual resumption of use of the operated hand and the return to normal use as comfort allows. He is given instructions regarding management of the fresh surgical incision and progressive use of desensitization and tissue massage techniques. We discussed the appropriate expectations and timeline for symptom improvement. He is provided a written patient instruction sheet titled: Postoperative Instructions After Ulnar Nerve Decompression. I have asked Mr. Chantell Kennedy to follow-up with me or contact me by telephone over the next 2-4 weeks if his symptoms have not fully resolved or if he has not regained full & painless return of function.       He is also specifically instructed to return to the office or

## 2022-02-07 NOTE — PATIENT INSTRUCTIONS
Postoperative Instructions After Carpal Tunnel Release    Dr. Charline Sanders. Jeffrey        1. After bandages are removed one week from surgery, you may chose to wear a small bandage over the incision if you wish, though you do not need to. 2. Keep incision dry until sutures have fully dissolved  or it has been 12 - 14 days since your surgery. Thereafter, you may wash with mild soap and water and shower normally. 3. Work hard on motion of the fingers and wrist, straightening each finger fully and bending each finger fully, bending wrist forward and bending wrist backwards. Do not be concerned if you experience discomfort. This will not damage the surgery. 4. You may begin using the hand as it feels comfortable beginning 12 - 14 days from the day of surgery. You may not feel entirely comfortable gripping or lifting heavy objects for several weeks. 5. You may expect to see some skin peel off around the incision. You may be left with a small area of pink baby skin. This is quite normal.  6. Once your stiches have fully disappeared & skin appears normal, you should begin gently massaging the incision with Vitamin E (may use Vitamin E lotion or contents of Vitamin E capsule). Thank you for choosing [facility] for your Hand and Upper Extremity needs. If we can be of any further assistance to you, please do not hesitate to contact us.     Office Phone Number:  [phone]  or  [phone]

## 2022-02-09 ENCOUNTER — OFFICE VISIT (OUTPATIENT)
Dept: ORTHOPEDIC SURGERY | Age: 72
End: 2022-02-09
Payer: COMMERCIAL

## 2022-02-09 VITALS — RESPIRATION RATE: 16 BRPM | BODY MASS INDEX: 44.38 KG/M2 | HEIGHT: 70 IN | WEIGHT: 310 LBS

## 2022-02-09 DIAGNOSIS — M17.12 PRIMARY OSTEOARTHRITIS OF LEFT KNEE: ICD-10-CM

## 2022-02-09 DIAGNOSIS — M17.11 PRIMARY OSTEOARTHRITIS OF RIGHT KNEE: Primary | ICD-10-CM

## 2022-02-09 PROCEDURE — 20610 DRAIN/INJ JOINT/BURSA W/O US: CPT | Performed by: ORTHOPAEDIC SURGERY

## 2022-02-09 NOTE — PROGRESS NOTES
ORTHOPAEDIC NEW PATIENT NOTE    Chief Complaint   Patient presents with    Follow-up     shruthi paez        HPI   2/9/22  Here for knee visco      1/19/22  80 y.o. male seen for evaluation of bilateral knee pain:  Previous patient of Dr Jono Christopher  Onset chronic/years  Injury/trauma none  History of symptoms as above  Pain is located diffuse bilateral knees  Right worse than left  Worse with activity  Better with rest, prior knee injections  No prior PT  Pain rated 1/10 today      No Known Allergies     Current Outpatient Medications   Medication Sig Dispense Refill    glimepiride (AMARYL) 1 MG tablet Take 1 mg by mouth every morning (before breakfast)      metoprolol tartrate (LOPRESSOR) 25 MG tablet Take 1 tablet by mouth 2 times daily 180 tablet 3    pantoprazole (PROTONIX) 40 MG tablet TAKE 1 TABLET(40 MG) BY MOUTH DAILY 90 tablet 0    spironolactone (ALDACTONE) 25 MG tablet TAKE 1 TABLET BY MOUTH DAILY 90 tablet 3    dilTIAZem (CARDIZEM CD) 240 MG extended release capsule TAKE 1 CAPSULE BY MOUTH DAILY 90 capsule 3    furosemide (LASIX) 20 MG tablet Take 1 tablet by mouth daily Or as directed 90 tablet 3    warfarin (COUMADIN) 5 MG tablet One tab daily, as directed  On Saturday 7/13 and Sunday 7/14 take only 1/2 tabs. Get INR on Monday 7/15 (Patient taking differently: Take 4 mg by mouth daily Managed by [facility]) 30 tablet 3    zolpidem (AMBIEN) 10 MG tablet nightly as needed . 2    atorvastatin (LIPITOR) 10 MG tablet Take 1 tablet by mouth daily. 30 tablet 0    aspirin EC 81 MG EC tablet Take 1 tablet by mouth daily. 30 tablet 3     No current facility-administered medications for this visit.        Past Medical History:   Diagnosis Date    Arthritis     Atrial fibrillation ([facility] [facility]     CAD (coronary artery disease)     Cancer (HCC)     skin    Diabetes mellitus ([facility] [address] [facility]     Hypercholesteremia     Hypertension     Obesity     Sleep apnea     does not use Cpap machine at present  Ulcer of pyloric antrum 2/2014    anemia/GI bleed while on NSAIDS    Wears glasses         Past Surgical History:   Procedure Laterality Date    ARM SURGERY Right 1/25/2022    RIGHT ULNAR NERVE DECOMPRESSION (AT ELBOW) performed by Marcio Almaraz MD at [facility] Right 1/25/2022    RIGHT CARPAL TUNNEL RELEASE performed by Marcio Almaraz MD at [address]      from back side--fatty lipoma    DIAGNOSTIC CARDIAC CATH LAB PROCEDURE  8/24/06    KNEE SURGERY      arthroscopy bilateral    UPPER GASTROINTESTINAL ENDOSCOPY  02/07/14    EGD/Colonoscopy with biopsy of ulcer    UPPER GASTROINTESTINAL ENDOSCOPY  03/19/2018    with bx       Family History   Problem Relation Age of Onset    Heart Disease Mother     Cancer Father     Cancer Sister         breast       Social History     Socioeconomic History    Marital status:      Spouse name: Not on file    Number of children: Not on file    Years of education: Not on file    Highest education level: Not on file   Occupational History    Not on file   Tobacco Use    Smoking status: Never Smoker    Smokeless tobacco: Never Used   Vaping Use    Vaping Use: Never used   Substance and Sexual Activity    Alcohol use: Yes     Alcohol/week: 1.0 standard drink     Types: 1 Cans of beer per week     Comment: seldom    Drug use: Never    Sexual activity: Yes     Partners: Female   Other Topics Concern    Not on file   Social History Narrative    Not on file     Social Determinants of Health     Financial Resource Strain:     Difficulty of Paying Living Expenses: Not on file   Food Insecurity:     Worried About Running Out of Food in the Last Year: Not on file    Lorie of Food in the Last Year: Not on file   Transportation Needs:     Lack of Transportation (Medical): Not on file    Lack of Transportation (Non-Medical):  Not on file   Physical Activity:     Days of Exercise per Week: Not on file  Minutes of Exercise per Session: Not on file   Stress:     Feeling of Stress : Not on file   Social Connections:     Frequency of Communication with Friends and Family: Not on file    Frequency of Social Gatherings with Friends and Family: Not on file    Attends Faith Services: Not on file    Active Member of Clubs or Organizations: Not on file    Attends Club or Organization Meetings: Not on file    Marital Status: Not on file   Intimate Partner Violence:     Fear of Current or Ex-Partner: Not on file    Emotionally Abused: Not on file    Physically Abused: Not on file    Sexually Abused: Not on file   Housing Stability:     Unable to Pay for Housing in the Last Year: Not on file    Number of [place] in the Last Year: Not on file    Unstable Housing in the Last Year: Not on file        Vitals:    02/09/22 1021   Resp: 16   Weight: (!) 310 lb (140.6 kg)   Height: 5' 10\" (1.778 m)       Physical Exam  PRIOR EXAM:  Constitutional  well-groomed, well-nourished, Body mass index is 44.48 kg/m². Psychiatric  pleasant, normal mood & affect  Cardiovascular  RRR, positive peripheral edema, dorsalis pedis pulse 2+  Respiratory  respirations unlabored, on room air; mask on  Skin  no rashes, wounds, or lesions seen on exposed skin  Neurological - BLE SILT SP/DP/T/sural/saphenous nerve distributions; EHL/FHL/TA/GS intact  Bilateral knees:   varus alignment    effusion noted   No obvious atrophy seen, but limited by body habitus    Range of Motion:    Extension:  10    Flexion:  100   Special tests:    crepitus with ROM    positive patellar grind test   Ligamentous testing:    Varus stress stable    Valgus stress stable    Imaging:  Prior images reviewed  Bilateral knees 3 views performed 1/19/22 - Overall alignment is varus. The medial compartment articular height is bone on bone consistent with severe OA. The lateral compartment articular height is mildly narrowed.   The anterior compartment articular height is moderately to severely narrowed. There are no loose bodies appreciated. Assessment & Plan:  80 y.o. male who presents with    Diagnosis Orders   1. Primary osteoarthritis of right knee  MN ARTHROCENTESIS ASPIR&/INJ MAJOR JT/BURSA W/O US   2. Primary osteoarthritis of left knee  MN ARTHROCENTESIS ASPIR&/INJ MAJOR JT/BURSA W/O US           Procedures    MN ARTHROCENTESIS ASPIR&/INJ MAJOR JT/BURSA W/O US       Conservative treatment of knee symptoms/arthritis, according to AAOS Clinical Practice Guidelines:  1)  Recommend oral or topical NSAIDs to reduce pain and swelling. 2)  Recommend acetaminophen to reduce pain. 3)  Oral narcotics, including tramadol, result in a significant increase of adverse events and are not effective at improving pain or function for treatment of osteoarthritis of the knee. 4)  Recommend weight loss to reduce stresses on the knee, particularly the patellofemoral compartment which sees up to 6x body weight. Weight loss referral previously given. 5)  Physical therapy referral with transition to home program, focusing on strengthening, low impact aerobic exercises, and neuromuscular education (i.e. balance, agility, coordination). 6)  Intra-articular corticosteroid injections are an option. Informed patient corticosteroid injections can be performed every 4 months as needed. 7)  Evidence for intra-articular hyaluronic acid injections (viscosupplementation) is not as strong, but may benefit a subset of patients who have failed other options. Informed patient viscosupplementation can be performed every 6 months as needed. 8)  Bracing and cane use can improve pain and function. Although not specifically listed in the CPGs, ice therapy and topical patches such as Salonpas are good options as well.     Risks and benefits of a viscosupplementation injection were discussed with the patient, including the possibility of adverse local site reactions such as infection or a pseudosepsis response. Although rare, an infection is possible and may necessitate surgical treatment if it occurs in the joint or develops into an abscess. The patient elected to proceed, and after verbal consent was obtained and drug allergies were reviewed, the injection site was prepped with alcohol and ChloraPrep. Durolane 60mg was injected into the bilateral knees. There were no immediate complications after the procedure. The patient was advised to ice the area intermittently over the next 24-48 hours until the injection becomes effective.       Quin Mondragon MD

## 2022-03-24 RX ORDER — DILTIAZEM HYDROCHLORIDE 240 MG/1
CAPSULE, COATED, EXTENDED RELEASE ORAL
Qty: 90 CAPSULE | Refills: 0 | Status: SHIPPED | OUTPATIENT
Start: 2022-03-24 | End: 2022-06-20

## 2022-03-24 RX ORDER — SPIRONOLACTONE 25 MG/1
25 TABLET ORAL DAILY
Qty: 90 TABLET | Refills: 0 | Status: SHIPPED | OUTPATIENT
Start: 2022-03-24 | End: 2022-06-20

## 2022-03-24 RX ORDER — FUROSEMIDE 20 MG/1
20 TABLET ORAL DAILY
Qty: 90 TABLET | Refills: 0 | Status: SHIPPED | OUTPATIENT
Start: 2022-03-24 | End: 2022-06-20

## 2022-06-20 DIAGNOSIS — I50.32 CHRONIC DIASTOLIC HEART FAILURE (HCC): Primary | ICD-10-CM

## 2022-06-20 RX ORDER — DILTIAZEM HYDROCHLORIDE 240 MG/1
CAPSULE, COATED, EXTENDED RELEASE ORAL
Qty: 90 CAPSULE | Refills: 0 | Status: SHIPPED | OUTPATIENT
Start: 2022-06-20 | End: 2022-09-15

## 2022-06-20 RX ORDER — SPIRONOLACTONE 25 MG/1
25 TABLET ORAL DAILY
Qty: 90 TABLET | Refills: 0 | Status: SHIPPED | OUTPATIENT
Start: 2022-06-20

## 2022-06-20 RX ORDER — FUROSEMIDE 20 MG/1
20 TABLET ORAL DAILY
Qty: 90 TABLET | Refills: 0 | Status: SHIPPED | OUTPATIENT
Start: 2022-06-20

## 2022-06-20 NOTE — TELEPHONE ENCOUNTER
dilTIAZem (CARDIZEM CD) 240 MG extended release capsule [[id number]]     Last OV:  22 NPKV  Last EK22  Appt scheduled : 22 HARRY      furosemide (LASIX) 20 MG tablet [[id number]]     Last labs:  21      spironolactone (ALDACTONE) 25 MG tablet [[id number]]     Last labs:21

## 2022-07-25 ENCOUNTER — HOSPITAL ENCOUNTER (OUTPATIENT)
Age: 72
Discharge: HOME | End: 2022-07-25
Payer: COMMERCIAL

## 2022-07-25 ENCOUNTER — OFFICE VISIT (OUTPATIENT)
Dept: CARDIOLOGY CLINIC | Age: 72
End: 2022-07-25
Payer: COMMERCIAL

## 2022-07-25 VITALS
BODY MASS INDEX: 43.23 KG/M2 | OXYGEN SATURATION: 95 % | WEIGHT: 302 LBS | SYSTOLIC BLOOD PRESSURE: 124 MMHG | HEART RATE: 70 BPM | DIASTOLIC BLOOD PRESSURE: 78 MMHG | HEIGHT: 70 IN

## 2022-07-25 DIAGNOSIS — R06.02 SOB (SHORTNESS OF BREATH): ICD-10-CM

## 2022-07-25 DIAGNOSIS — I48.20 CHRONIC ATRIAL FIBRILLATION (HCC): ICD-10-CM

## 2022-07-25 DIAGNOSIS — E78.00 PURE HYPERCHOLESTEROLEMIA: ICD-10-CM

## 2022-07-25 DIAGNOSIS — Z86.2 HISTORY OF ANEMIA: ICD-10-CM

## 2022-07-25 DIAGNOSIS — I10 ESSENTIAL HYPERTENSION: ICD-10-CM

## 2022-07-25 DIAGNOSIS — I50.32 CHRONIC DIASTOLIC HEART FAILURE (HCC): ICD-10-CM

## 2022-07-25 DIAGNOSIS — G47.33 OSA (OBSTRUCTIVE SLEEP APNEA): ICD-10-CM

## 2022-07-25 DIAGNOSIS — I50.32 CHRONIC DIASTOLIC HEART FAILURE (HCC): Primary | ICD-10-CM

## 2022-07-25 LAB
A/G RATIO: 1.2 (ref 1.1–2.2)
ALBUMIN SERPL-MCNC: 3.9 G/DL (ref 3.4–5)
ALP BLD-CCNC: 131 U/L (ref 40–129)
ALT SERPL-CCNC: 22 U/L (ref 10–40)
ANION GAP SERPL CALCULATED.3IONS-SCNC: 14 MMOL/L (ref 3–16)
AST SERPL-CCNC: 25 U/L (ref 15–37)
BASOPHILS ABSOLUTE: 0.1 K/UL (ref 0–0.2)
BASOPHILS RELATIVE PERCENT: 1.1 %
BILIRUB SERPL-MCNC: 1 MG/DL (ref 0–1)
BUN BLDV-MCNC: 22 MG/DL (ref 7–20)
CALCIUM SERPL-MCNC: 9.2 MG/DL (ref 8.3–10.6)
CHLORIDE BLD-SCNC: 105 MMOL/L (ref 99–110)
CO2: 24 MMOL/L (ref 21–32)
CREAT SERPL-MCNC: 1.2 MG/DL (ref 0.8–1.3)
EOSINOPHILS ABSOLUTE: 0.3 K/UL (ref 0–0.6)
EOSINOPHILS RELATIVE PERCENT: 3.2 %
FERRITIN: 68.6 NG/ML (ref 30–400)
GFR AFRICAN AMERICAN: >60
GFR NON-AFRICAN AMERICAN: 60
GLUCOSE BLD-MCNC: 92 MG/DL (ref 70–99)
HCT VFR BLD CALC: 39.2 % (ref 40.5–52.5)
HEMOGLOBIN: 12.8 G/DL (ref 13.5–17.5)
IRON SATURATION: 34 % (ref 20–50)
IRON: 120 UG/DL (ref 59–158)
LYMPHOCYTES ABSOLUTE: 1.2 K/UL (ref 1–5.1)
LYMPHOCYTES RELATIVE PERCENT: 15.2 %
MCH RBC QN AUTO: 27.2 PG (ref 26–34)
MCHC RBC AUTO-ENTMCNC: 32.8 G/DL (ref 31–36)
MCV RBC AUTO: 82.9 FL (ref 80–100)
MONOCYTES ABSOLUTE: 0.8 K/UL (ref 0–1.3)
MONOCYTES RELATIVE PERCENT: 9.9 %
NEUTROPHILS ABSOLUTE: 5.6 K/UL (ref 1.7–7.7)
NEUTROPHILS RELATIVE PERCENT: 70.6 %
PDW BLD-RTO: 17.6 % (ref 12.4–15.4)
PLATELET # BLD: 260 K/UL (ref 135–450)
PMV BLD AUTO: 8.4 FL (ref 5–10.5)
POTASSIUM SERPL-SCNC: 4.8 MMOL/L (ref 3.5–5.1)
RBC # BLD: 4.73 M/UL (ref 4.2–5.9)
SODIUM BLD-SCNC: 143 MMOL/L (ref 136–145)
TOTAL IRON BINDING CAPACITY: 353 UG/DL (ref 260–445)
TOTAL PROTEIN: 7.1 G/DL (ref 6.4–8.2)
WBC # BLD: 8 K/UL (ref 4–11)

## 2022-07-25 PROCEDURE — 82728 ASSAY OF FERRITIN: CPT

## 2022-07-25 PROCEDURE — 83550 IRON BINDING TEST: CPT

## 2022-07-25 PROCEDURE — 85025 COMPLETE CBC W/AUTO DIFF WBC: CPT

## 2022-07-25 PROCEDURE — 1123F ACP DISCUSS/DSCN MKR DOCD: CPT | Performed by: INTERNAL MEDICINE

## 2022-07-25 PROCEDURE — 83880 ASSAY OF NATRIURETIC PEPTIDE: CPT

## 2022-07-25 PROCEDURE — 99214 OFFICE O/P EST MOD 30 MIN: CPT | Performed by: INTERNAL MEDICINE

## 2022-07-25 PROCEDURE — 80053 COMPREHEN METABOLIC PANEL: CPT

## 2022-07-25 PROCEDURE — 36415 COLL VENOUS BLD VENIPUNCTURE: CPT

## 2022-07-25 PROCEDURE — 83540 ASSAY OF IRON: CPT

## 2022-07-25 RX ORDER — MULTIVIT-MIN/IRON/FOLIC ACID/K 18-600-40
CAPSULE ORAL
COMMUNITY

## 2022-07-25 NOTE — PROGRESS NOTES
[facility]  Advanced CHF/Pulmonary Hypertension   Cardiac Evaluation      Paola Rodriguez  YOB: 1950    Date of Visit:  7/25/22    Chief Complaint   Patient presents with    6 Month Follow-Up    Hypertension    Shortness of Breath     Since  having covid      History of Present Illness:  Sharon Rivera is a 71 y.o. male with a past medical history of PHTN, non-obstructive CAD, HTN, HLD, LUZ MARINA, & chronic atrial fibrillation diagnosed in 2004 treated with sotalol & Coumadin. He had an acute GIB in March 2018 and was started on PPI. He now has increased RVSP from 41mmHg in August 2014 to 70mmHg in Sept 2019. He was started on spironolactone. He had his sleep apnea testing, and it was severely positive. He failed CPAP and was changed to Bipap. He had Covid Nov 2020 (symptoms primarily loss of taste & smell, no dyspnea). He had Covid Nov 2020. At previous visit 2/5/21, he c/o chest pain. Subsequent nuclear stress test 2/11/21 was normal.    He had carpal tunnel surgery Jan 2022. Today, he is here for regular follow up. He states he feels well overall from a cardiac standpoint. He does remain short of breath with activity \"since Covid\"; some days he can do more and walk farther with minimal symptoms. He denies exertional chest pain, PND, palpitations, light-headedness, edema. He has chronically sore ankles after walking long distances like when golfing; he also has knee issues and receives the filler injections. Recent labs thru PCP office. He is looking forward to his son's upcoming wedding (at [facility]).      NYHA Class II-III    No Known Allergies    Current Outpatient Medications   Medication Sig Dispense Refill    Cholecalciferol (VITAMIN D) 50 MCG (2000 UT) CAPS capsule Take by mouth      spironolactone (ALDACTONE) 25 MG tablet TAKE 1 TABLET BY MOUTH DAILY 90 tablet 0    furosemide (LASIX) 20 MG tablet TAKE 1 TABLET BY MOUTH DAILY OR AS DIRECTED 90 tablet 0 dilTIAZem (CARDIZEM CD) 240 MG extended release capsule TAKE 1 CAPSULE BY MOUTH DAILY 90 capsule 0    glimepiride (AMARYL) 1 MG tablet Take 1 mg by mouth in the morning and 1 mg before bedtime. metoprolol tartrate (LOPRESSOR) 25 MG tablet Take 1 tablet by mouth 2 times daily 180 tablet 3    pantoprazole (PROTONIX) 40 MG tablet TAKE 1 TABLET(40 MG) BY MOUTH DAILY 90 tablet 0    warfarin (COUMADIN) 5 MG tablet One tab daily, as directed  On Saturday 7/13 and Sunday 7/14 take only 1/2 tabs. Get INR on Monday 7/15 (Patient taking differently: Take 4 mg by mouth in the morning. Managed by [facility].) 30 tablet 3    zolpidem (AMBIEN) 10 MG tablet nightly as needed . 2    atorvastatin (LIPITOR) 10 MG tablet Take 1 tablet by mouth daily. 30 tablet 0    aspirin EC 81 MG EC tablet Take 1 tablet by mouth daily. 30 tablet 3     No current facility-administered medications for this visit.        Past Medical History:   Diagnosis Date    Arthritis     Atrial fibrillation (HCC)     CAD (coronary artery disease)     Cancer (HCC)     skin    Diabetes mellitus ([facility] [address]     Hypercholesteremia     Hypertension     Obesity     Sleep apnea     does not use Cpap machine at present    Ulcer of pyloric antrum 2/2014    anemia/GI bleed while on NSAIDS    Wears glasses      Past Surgical History:   Procedure Laterality Date    ARM SURGERY Right 1/25/2022    RIGHT ULNAR NERVE DECOMPRESSION (AT ELBOW) performed by Ruperto Singh MD at [place] [address] Right 1/25/2022    RIGHT CARPAL TUNNEL RELEASE performed by Ruperto Singh MD at [facility]      from back side--fatty lipoma    DIAGNOSTIC CARDIAC CATH LAB PROCEDURE  8/24/06    KNEE SURGERY      arthroscopy bilateral    UPPER GASTROINTESTINAL ENDOSCOPY  02/07/14    EGD/Colonoscopy with biopsy of ulcer    UPPER GASTROINTESTINAL ENDOSCOPY  03/19/2018    with bx     Family History   Problem Relation Age of Onset    Heart Disease Mother Cancer Father     Cancer Sister         breast     Social History     Socioeconomic History    Marital status:      Spouse name: Not on file    Number of children: Not on file    Years of education: Not on file    Highest education level: Not on file   Occupational History    Not on file   Tobacco Use    Smoking status: Never    Smokeless tobacco: Never   Vaping Use    Vaping Use: Never used   Substance and Sexual Activity    Alcohol use: Yes     Alcohol/week: 1.0 standard drink     Types: 1 Cans of beer per week     Comment: seldom    Drug use: Never    Sexual activity: Yes     Partners: Female   Other Topics Concern    Not on file   Social History Narrative    Not on file     Social Determinants of Health     Financial Resource Strain: Not on file   Food Insecurity: Not on file   Transportation Needs: Not on file   Physical Activity: Not on file   Stress: Not on file   Social Connections: Not on file   Intimate Partner Violence: Not on file   Housing Stability: Not on file       Review of Systems:   Constitutional: there has been no unanticipated weight loss. There's been no change in energy level, sleep pattern, or activity level. Eyes: No visual changes or diplopia. No scleral icterus. ENT: No Headaches, hearing loss or vertigo. No mouth sores or sore throat. Cardiovascular: Reviewed in HPI  Respiratory: No cough or wheezing, no sputum production. No hematemesis. Gastrointestinal: No abdominal pain, appetite loss, blood in stools. No change in bowel or bladder habits. Genitourinary: No dysuria, trouble voiding, or hematuria. Musculoskeletal:  No gait disturbance, weakness or joint complaints. Integumentary: No rash or pruritis. Neurological: No headache, diplopia, change in muscle strength, numbness or tingling. No change in gait, balance, coordination, mood, affect, memory, mentation, behavior. Psychiatric: No anxiety, no depression.   Endocrine: No malaise, fatigue or temperature intolerance. No excessive thirst, fluid intake, or urination. No tremor. Hematologic/Lymphatic: No abnormal bruising or bleeding, blood clots or swollen lymph nodes. Allergic/Immunologic: No nasal congestion or hives. Physical Examination:    Vitals:    07/25/22 0905   BP: 124/78   Site: Right Upper Arm   Position: Sitting   Cuff Size: Large Adult   Pulse: 70   SpO2: 95%   Weight: (!) 302 lb (137 kg)   Height: 5' 10\" (1.778 m)       Body mass index is 43.33 kg/m². Wt Readings from Last 3 Encounters:   07/25/22 (!) 302 lb (137 kg)   02/09/22 (!) 310 lb (140.6 kg)   02/07/22 (!) 310 lb (140.6 kg)     BP Readings from Last 3 Encounters:   07/25/22 124/78   01/25/22 110/60   01/25/22 130/63     Constitutional and General Appearance:   WD/WN in NAD, obese gentleman  HEENT:  NC/AT  ISIDRO  No problems with hearing  Skin:  Warm, dry  Respiratory:  Normal excursion and expansion without use of accessory muscles  Resp Auscultation: Normal breath sounds without dullness  Cardiovascular: The apical impulses not displaced  Heart tones are crisp and normal  Cervical veins are not engorged  The carotid upstroke is normal in amplitude and contour without delay or bruit  JVP less than 8 cm H2O  RRR with nl S1 and S2 without m,r,g  Peripheral pulses are symmetrical and full  There is no clubbing, cyanosis of the extremities. Trace bilateral edema  Femoral Arteries: 2+ and equal  Pedal Pulses: 2+ and equal   Neck:  No thyromegaly  Abdomen: Obese  No masses or tenderness  Liver/Spleen: No Abnormalities Noted  Neurological/Psychiatric:  Alert and oriented in all spheres  Moves all extremities well  Exhibits normal gait balance and coordination  No abnormalities of mood, affect, memory, mentation, or behavior are noted    Diagnostic Testing:    Lexiscan dorene 2/11/21 (for cp)  Summary    Normal LV size and systolic function. Normal myocardial perfusion study.     Mild apical thinning which is clinically nonsignificant Stress Protocols        Resting ECG    Normal sinus rhythm. Normal ECG. Resting HR:53 bpm        Resting BP:113/50 mmHg     ECHO 2/5/2021  -Normal left ventricle size, wall thickness, and systolic function with an estimated ejection fraction of 60%. Indeterminate diastolic function. -Mild mitral regurgitation.   -The left atrium is dilated. -Mild tricuspid regurgitation with PASP of 53 mmHg. This is suggestive of moderate pulmonary hypertension. The right ventricle is mildly dilated. The right atrial size is dilated. Echo 9/25/19   Normal left ventricle size and systolic function with an estimated ejection   fraction of 60%. No regional wall motion abnormalities are seen. There is mild concentric left ventricular hypertrophy. E/e\"= 14. Indeterminate diastolic function. A-fib   The left atrium is mildly dilated. The right ventricle is mildly enlarged and decreased in function. Mild tricuspid regurgitation. PASP 70mmHg. PHTN   The right atrium is moderately dilated. IVC size is dilated (>2.1 cm) but collapses > 50% with respiration   consistent with elevated RA pressure (8 mmHg). 11/13/19    TC  124, HDL 57, LDL 54, TG 66.    Hgb 12.1, Hct 37.5  BUN 24, Creat 1.0, K+ 4.5 (on spironolactone 12.5mg)  A1C 6.2 down from 6.7 in April 2019  TSH 1.84    Echo 8/29/14  Normal left ventricle size and systolic function with an estimated ejection   fraction of 55%. No regional wall motion abnormalities are seen. -There is mild concentric left ventricular hypertrophy.   -No A wave. E/e\"= 12.   -Trace of mitral regurgitation.   -Biatrial enlargement. -Mild tricuspid regurgitation. RVSP 41mmHg. [facility] 8/2006  Dr. Emily Rios   Normal coronaries. Non obstructive CAD. Normal LV function. Labs were reviewed including labs from other hospital systems through [facility].   Cardiac testing was reviewed including echos, nuclear scans, cardiac catheterization, including from other hospital systems through [facility]. Assessment:    1. Chronic diastolic heart failure ([facility] [address]    2. Essential hypertension    3. Chronic atrial fibrillation ([facility] [address]    4. SOB (shortness of breath)    5. Pure hypercholesterolemia    6. LUZ MARINA (obstructive sleep apnea)    7. History of anemia      1. Chronic diastolic heart failure:  Stable. Compensated by exam.  No SOB or edema at present.   -Having SOB since Covid Nov 2020, but is starting to feel better, takes walks. Down 6# since Sept 2020.  -He takes spironolactone 25mg qd  -He takes Lasix 20mg qod. Creat improved with decreased Lasix dose. -ProBNP> 1509 (10/30/2019)> 1846 (3/4/2020)> 2018 (6/29/2020)> 2296 (9/30/2020)> 2028 (7/6/21). Labs thru PCP office.  -Echo 9/2019 shows mild concentric left ventricular hypertrophy. EF 60%. -Echo 2/5/21 EF 60% and RVSP 53mmHg. 2. Pulmonary hypertension:  Stable  -Continue spironolactone. -Off Lisinopril since May 2022.  -ECHO 2/5/21> PASP 53mmHg > improved from 70mmHg in 9/2019  -ECHO 9/25/19> RVSP 70mmHg. 3. Chronic atrial fibrillation: HR irregular & controlled. -Remains on Coumadin therapy. Managed by PCP. -Hx GI bleed. Now off all ibuprofen. -H/H stable.   -Discussed Watchman but bleeding issues. 4. Pure hypercholesterolemia:  Continue Lipitor 10mg daily.  -6/14/21  , HDL 38, LDL 66, TG 79.      5. Morbid obesity with BMI of 40.0-44.9, adult: Advised weight loss measures. - He has limited ability to exercise due to severe ankle pain. 6. LUZ MARINA (obstructive sleep apnea):  He turned in his C-pap due to the recall and has no received a replacement machine yet. He sleeps with his head elevated and states he sleeps well. Plan:    1. No med changes  2. Labs thru PCP office -- will call for results  3. Lab work today>  4. ECHO soon  5. RTO in 4 months        Time Based Itemization  A total of 30 minutes was spent on today's patient encounter.   If applicable, non-patient-facing activities:  ( x)Preparing to see the patient and reviewing records  (x ) Individual interpretation of results  ( ) Discussion or coordination of care with other health care professionals  ( x) Ordering of unique tests, medications, or procedures  ( x) Documentation within the EHR      I appreciate the opportunity of cooperating in the care of this patient. Jemma Elder M.D., [address] attestation: This note was scribed in the presence of Dr. Ludwin Sue MD, by Yonas Grossman RN. The scribe's documentation has been prepared under my direction and personally reviewed by me in its entirety. I confirm that the note above accurately reflects all work, treatment, procedures, and medical decision making performed by me.

## 2022-07-26 LAB — PRO-BNP: 2121 PG/ML (ref 0–124)

## 2022-08-12 ENCOUNTER — OFFICE VISIT (OUTPATIENT)
Dept: ORTHOPEDIC SURGERY | Age: 72
End: 2022-08-12
Payer: COMMERCIAL

## 2022-08-12 VITALS — WEIGHT: 305 LBS | BODY MASS INDEX: 43.67 KG/M2 | HEIGHT: 70 IN

## 2022-08-12 DIAGNOSIS — M17.12 PRIMARY OSTEOARTHRITIS OF LEFT KNEE: Primary | ICD-10-CM

## 2022-08-12 PROCEDURE — 1123F ACP DISCUSS/DSCN MKR DOCD: CPT | Performed by: ORTHOPAEDIC SURGERY

## 2022-08-12 PROCEDURE — 20610 DRAIN/INJ JOINT/BURSA W/O US: CPT | Performed by: ORTHOPAEDIC SURGERY

## 2022-08-12 PROCEDURE — 99212 OFFICE O/P EST SF 10 MIN: CPT | Performed by: ORTHOPAEDIC SURGERY

## 2022-08-12 RX ORDER — BUPIVACAINE HYDROCHLORIDE 2.5 MG/ML
1 INJECTION, SOLUTION EPIDURAL; INFILTRATION; INTRACAUDAL ONCE
Status: COMPLETED | OUTPATIENT
Start: 2022-08-12 | End: 2022-08-12

## 2022-08-12 RX ORDER — LIDOCAINE HYDROCHLORIDE 10 MG/ML
1 INJECTION, SOLUTION INFILTRATION; PERINEURAL ONCE
Status: COMPLETED | OUTPATIENT
Start: 2022-08-12 | End: 2022-08-12

## 2022-08-12 RX ORDER — TRIAMCINOLONE ACETONIDE 40 MG/ML
40 INJECTION, SUSPENSION INTRA-ARTICULAR; INTRAMUSCULAR ONCE
Status: COMPLETED | OUTPATIENT
Start: 2022-08-12 | End: 2022-08-12

## 2022-08-12 RX ADMIN — BUPIVACAINE HYDROCHLORIDE 2.5 MG: 2.5 INJECTION, SOLUTION EPIDURAL; INFILTRATION; INTRACAUDAL at 16:41

## 2022-08-12 RX ADMIN — LIDOCAINE HYDROCHLORIDE 1 ML: 10 INJECTION, SOLUTION INFILTRATION; PERINEURAL at 16:40

## 2022-08-12 RX ADMIN — TRIAMCINOLONE ACETONIDE 40 MG: 40 INJECTION, SUSPENSION INTRA-ARTICULAR; INTRAMUSCULAR at 16:40

## 2022-08-12 NOTE — PROGRESS NOTES
ORTHOPAEDIC NEW PATIENT NOTE    Chief Complaint   Patient presents with    Knee Pain     F/u left knee, injured while in [place] getting into the pool on Wed. 8/10/22       Knee Pain     8/12/22  Shola Mayfield presents to clinic today due to left knee injury  He was in [place] when he injured it getting into the pool  He reports he jammed it  This occurred 2 days ago  He describes pain anterolaterally, rated 6-7/10  Worse with stairs/incline  Has been icing with some relief  He returned to [place] early because of this  His right knee is doing fine currently  Prior to this, both knees were doing well after the viscosupplementation in February 2/9/22  Here for knee visco      1/19/22  80 y.o. male seen for evaluation of bilateral knee pain:  Previous patient of Dr Charlie Kinsey  Onset chronic/years  Injury/trauma none  History of symptoms as above  Pain is located diffuse bilateral knees  Right worse than left  Worse with activity  Better with rest, prior knee injections  No prior PT  Pain rated 1/10 today      No Known Allergies     Current Outpatient Medications   Medication Sig Dispense Refill    Cholecalciferol (VITAMIN D) 50 MCG (2000 UT) CAPS capsule Take by mouth      spironolactone (ALDACTONE) 25 MG tablet TAKE 1 TABLET BY MOUTH DAILY 90 tablet 0    furosemide (LASIX) 20 MG tablet TAKE 1 TABLET BY MOUTH DAILY OR AS DIRECTED 90 tablet 0    dilTIAZem (CARDIZEM CD) 240 MG extended release capsule TAKE 1 CAPSULE BY MOUTH DAILY 90 capsule 0    glimepiride (AMARYL) 1 MG tablet Take 1 mg by mouth in the morning and 1 mg before bedtime. metoprolol tartrate (LOPRESSOR) 25 MG tablet Take 1 tablet by mouth 2 times daily 180 tablet 3    pantoprazole (PROTONIX) 40 MG tablet TAKE 1 TABLET(40 MG) BY MOUTH DAILY 90 tablet 0    warfarin (COUMADIN) 5 MG tablet One tab daily, as directed  On Saturday 7/13 and Sunday 7/14 take only 1/2 tabs.   Get INR on Monday 7/15 (Patient taking differently: Take 4 mg by mouth in the morning. Managed by [facility].) 30 tablet 3    zolpidem (AMBIEN) 10 MG tablet nightly as needed . 2    atorvastatin (LIPITOR) 10 MG tablet Take 1 tablet by mouth daily. 30 tablet 0    aspirin EC 81 MG EC tablet Take 1 tablet by mouth daily.  30 tablet 3     Current Facility-Administered Medications   Medication Dose Route Frequency Provider Last Rate Last Admin    bupivacaine (PF) (MARCAINE) 0.25 % injection 2.5 mg  1 mL IntraDERmal Once Moy Angeles MD        lidocaine 1 % injection 1 mL  1 mL IntraDERmal Once Moy Angeles MD        triamcinolone acetonide (KENALOG-40) injection 40 mg  40 mg Intra-artICUlar Once Moy Angeles MD           Past Medical History:   Diagnosis Date    Arthritis     Atrial fibrillation [facility])     CAD (coronary artery disease)     Cancer ([facility] [place] [address]     skin    Diabetes mellitus ([facility] [address]     Hypercholesteremia     Hypertension     Obesity     Sleep apnea     does not use Cpap machine at present    Ulcer of pyloric antrum 2/2014    anemia/GI bleed while on NSAIDS    Wears glasses         Past Surgical History:   Procedure Laterality Date    ARM SURGERY Right 1/25/2022    RIGHT ULNAR NERVE DECOMPRESSION (AT ELBOW) performed by Darrel Recinos MD at [facility] Right 1/25/2022    RIGHT CARPAL TUNNEL RELEASE performed by Darrel Recinos MD at [facility]      from back side--fatty lipoma    DIAGNOSTIC CARDIAC CATH LAB PROCEDURE  8/24/06    KNEE SURGERY      arthroscopy bilateral    UPPER GASTROINTESTINAL ENDOSCOPY  02/07/14    EGD/Colonoscopy with biopsy of ulcer    UPPER GASTROINTESTINAL ENDOSCOPY  03/19/2018    with bx       Family History   Problem Relation Age of Onset    Heart Disease Mother     Cancer Father     Cancer Sister         breast       Social History     Socioeconomic History    Marital status:      Spouse name: Not on file    Number of children: Not on file    Years of education: Not on file    Highest education level: Not on file   Occupational History    Not on file   Tobacco Use    Smoking status: Never    Smokeless tobacco: Never   Vaping Use    Vaping Use: Never used   Substance and Sexual Activity    Alcohol use: Yes     Alcohol/week: 1.0 standard drink     Types: 1 Cans of beer per week     Comment: seldom    Drug use: Never    Sexual activity: Yes     Partners: Female   Other Topics Concern    Not on file   Social History Narrative    Not on file     Social Determinants of Health     Financial Resource Strain: Not on file   Food Insecurity: Not on file   Transportation Needs: Not on file   Physical Activity: Not on file   Stress: Not on file   Social Connections: Not on file   Intimate Partner Violence: Not on file   Housing Stability: Not on file        Vitals:    08/12/22 0956   Weight: (!) 305 lb (138.3 kg)   Height: 5' 10\" (1.778 m)       Physical Exam  Constitutional - well-groomed, well-nourished, Body mass index is 43.76 kg/m². Psychiatric - pleasant, normal mood & affect  Cardiovascular - RRR, positive peripheral edema, dorsalis pedis pulse 2+  Neurological - BLE SILT SP/DP/T/sural/saphenous nerve distributions; EHL/FHL/TA/GS intact  Left knee:   varus alignment    effusion noted   No obvious atrophy seen, but limited by body habitus    TTP medial and lateral joint lines   Range of Motion:    Extension:  14    Flexion:  104   Special tests:    crepitus with ROM    positive patellar grind test   Ligamentous testing:    Varus stress stable    Valgus stress stable      Imaging:  Images were personally reviewed by myself and discussed with the patient  Left knee 4 views performed 8/12/22 - Overall alignment is varus. The medial compartment articular height is bone on bone consistent with severe OA. The lateral compartment articular height is mildly narrowed. The anterior compartment articular height is severely narrowed. There are no loose bodies appreciated. Extensive osteophytes.   No fractures definitively seen. Assessment & Plan:  71 y.o. male who presents with    Diagnosis Orders   1. Primary osteoarthritis of left knee  XR KNEE LEFT (MIN 4 VIEWS)    17950 - GA DRAIN/INJECT LARGE JOINT/BURSA    bupivacaine (PF) (MARCAINE) 0.25 % injection 2.5 mg    lidocaine 1 % injection 1 mL    triamcinolone acetonide (KENALOG-40) injection 40 mg      2. Adult BMI 40.0-44.9 kg/sq m ([address]                Procedures    09354 - GA DRAIN/INJECT LARGE JOINT/BURSA         Knee arthritis exacerbation    Conservative treatment of knee symptoms/arthritis, according to AAOS Clinical Practice Guidelines:  1)  Recommend oral or topical NSAIDs to reduce pain and swelling. 2)  Recommend acetaminophen to reduce pain. 3)  Oral narcotics, including tramadol, result in a significant increase of adverse events and are not effective at improving pain or function for treatment of osteoarthritis of the knee. 4)  Recommend weight loss to reduce stresses on the knee, particularly the patellofemoral compartment which sees up to 6x body weight. Weight loss referral previously given. 5)  Continue home exercise program, focusing on strengthening, low impact aerobic exercises, and neuromuscular education. 6)  Intra-articular corticosteroid injections are an option. Informed patient corticosteroid injections can be performed every 4 months as needed. 7)  Evidence for intra-articular hyaluronic acid injections (viscosupplementation) is not as strong, but may benefit a subset of patients who have failed other options. Informed patient viscosupplementation can be performed every 6 months as needed. 8)  Bracing and cane use can improve pain and function. Although not specifically listed in the CPGs, ice therapy and topical patches such as Salonpas are good options as well.     Good relief with previous visco  Would like more immediate relief today, has wedding next week  Would like steroid inj  Risks and benefits of a steroid injection were discussed with the patient, including the possibility of adverse local site reactions such as dermal atrophy and skin discoloration. Although rare, an infection is possible and may necessitate surgical treatment if it occurs in a joint or develops into an abscess. Finally, a rise in blood sugar levels is anticipated, particularly in diabetics. Diabetic patients were instructed to monitor their blood glucose levels after the injection and to adjust their insulin regimen as appropriate. The patient elected to proceed, and after verbal consent was obtained and drug allergies were reviewed, the injection site was prepped with alcohol and ChloraPrep. 40mg of Kenalog mixed with lidocaine and marcaine (no epinephrine) was injected into the left knee(s). There were no immediate complications after the procedure. The patient was advised to ice the area intermittently over the next 24-48 hours until the corticosteroid becomes effective.     Again discussed weight loss  TKA a possibility, goal BMI <40    Yesi Bradshaw MD

## 2022-09-12 ENCOUNTER — HOSPITAL ENCOUNTER (OUTPATIENT)
Dept: NON INVASIVE DIAGNOSTICS | Age: 72
Discharge: HOME | End: 2022-09-12
Payer: COMMERCIAL

## 2022-09-12 DIAGNOSIS — I50.32 CHRONIC DIASTOLIC HEART FAILURE (HCC): ICD-10-CM

## 2022-09-12 LAB
LV EF: 65 %
LVEF MODALITY: NORMAL

## 2022-09-12 PROCEDURE — C8929 TTE W OR WO FOL WCON,DOPPLER: HCPCS

## 2022-09-12 PROCEDURE — 6360000004 HC RX CONTRAST MEDICATION: Performed by: INTERNAL MEDICINE

## 2022-09-12 RX ADMIN — PERFLUTREN 1.65 MG: 6.52 INJECTION, SUSPENSION INTRAVENOUS at 15:53

## 2022-09-15 RX ORDER — DILTIAZEM HYDROCHLORIDE 240 MG/1
CAPSULE, COATED, EXTENDED RELEASE ORAL
Qty: 90 CAPSULE | Refills: 0 | Status: SHIPPED | OUTPATIENT
Start: 2022-09-15

## 2022-09-28 ENCOUNTER — TELEPHONE (OUTPATIENT)
Dept: CARDIOLOGY CLINIC | Age: 72
End: 2022-09-28

## 2022-11-09 ASSESSMENT — ENCOUNTER SYMPTOMS
GASTROINTESTINAL NEGATIVE: 1
SHORTNESS OF BREATH: 1

## 2022-11-09 NOTE — PROGRESS NOTES
[facility]   Congestive Heart Failure    PrimaryCare Doctor:  Laureen Obrien MD  Primary Cardiologist: Shayy Lares     Last OV Nov 2022: start jardiance    Chief Complaint:  SOB    History of Present Illness:  Shira Marsh is a 71 y.o. male with  PHTN, non-obstructive CAD, HTN, HLD, LUZ MARINA, & chronic atrial fibrillation who presents today for f/u. Today he c/o increased dyspnea with walking that is intermittent, orthopnea and continued edema. His wt is up about 10lb since last OV. He eats out a lot but fluid intake is within limits. He denies chest pain, palpitations, orthopnea, PND, exertional chest pressure/discomfort, fatigue, early saiety, syncope. ER Visit: No  Recent Hospitalization: No    Baseline Weight: 305  Wt Readings from Last 3 Encounters:   11/15/22 (!) 314 lb (142.4 kg)   08/12/22 (!) 305 lb (138.3 kg)   07/25/22 (!) 302 lb (137 kg)          EF: 60%  Cardiac Imaging:Echo 9/12/22:   Summary   Technically difficult study due to body habitus. Definity was administered. Suboptimal image quality. Left ventricular cavity size is normal with mild concentric left ventricular   hypertrophy. Ejection fraction is visually estimated to be 65%. No regional wall motion abnormalities are noted. Indeterminate findings in diastolic function due to atrial fibrillation. Left atrium is upper limits of normal.   The tricuspid valve was not well visualized. Mild tricuspid regurgitation. The right ventricle is normal in size with reduced function. TAPSE: 1.41 cm. RVS velocity: 12.0 cm/s. IVC size is normal (<2.1cm) and collapses > 50% with respiration consistent   with normal RA pressure (3mmHg). Estimated pulmonary artery systolic pressure is elevated at 43 mmHg assuming   a right atrial pressure of 3 mmHg. Lexiscan dorene 2/11/21 (for cp)  Summary    Normal LV size and systolic function. Normal myocardial perfusion study.     Mild apical thinning which is clinically nonsignificant       Stress Protocols        Resting ECG    Normal sinus rhythm. Normal ECG. Resting HR:53 bpm        Resting BP:113/50 mmHg      ECHO 2/5/2021  -Normal left ventricle size, wall thickness, and systolic function with an estimated ejection fraction of 60%. Indeterminate diastolic function. -Mild mitral regurgitation.   -The left atrium is dilated. -Mild tricuspid regurgitation with PASP of 53 mmHg. This is suggestive of moderate pulmonary hypertension. The right ventricle is mildly dilated. The right atrial size is dilated. Activity: decreased   Can you walk 1-2 blocks or do a moderate amount of house/yard work? Yes      NYHA Class: II         Pt Education: The patient has received education on the following topics: dietary sodium restriction, heart failure medications, the importance of physical activity, symptom management and weight monitoring       Past Medical History:   has a past medical history of Arthritis, Atrial fibrillation ([facility] [address] CAD (coronary artery disease), Cancer ([facility] [address] Diabetes mellitus ([facility] [address] Hypercholesteremia, Hypertension, Obesity, Sleep apnea, Ulcer of pyloric antrum, and Wears glasses. Surgical History:   has a past surgical history that includes Diagnostic Cardiac Cath Lab Procedure (8/24/06); knee surgery; Colonoscopy; Upper gastrointestinal endoscopy (02/07/14); cyst removal; Upper gastrointestinal endoscopy (03/19/2018); Arm Surgery (Right, 1/25/2022); and Carpal tunnel release (Right, 1/25/2022). Social History:   reports that he has never smoked. He has never used smokeless tobacco. He reports current alcohol use of about 1.0 standard drink per week. He reports that he does not use drugs. Family History:   Family History   Problem Relation Age of Onset    Heart Disease Mother     Cancer Father     Cancer Sister         breast       HomeMedications:  Prior to Admission medications    Medication Sig Start Date End Date Taking?  Authorizing Provider metoprolol tartrate (LOPRESSOR) 25 MG tablet TAKE 1 TABLET BY MOUTH TWICE DAILY 10/25/22   Rishabh Tobar MD   dilTIAZem [facility] CD) 240 MG extended release capsule TAKE 1 CAPSULE BY MOUTH DAILY 9/15/22   Illona LipsSHAMAR CNP   Cholecalciferol (VITAMIN D) 50 MCG (2000 UT) CAPS capsule Take by mouth    Historical Provider, MD   spironolactone (ALDACTONE) 25 MG tablet TAKE 1 TABLET BY MOUTH DAILY 6/20/22   Illona LipsSHAMAR CNP   furosemide (LASIX) 20 MG tablet TAKE 1 TABLET BY MOUTH DAILY OR AS DIRECTED 6/20/22   Illona LipsSHAMAR CNP   glimepiride (AMARYL) 1 MG tablet Take 1 mg by mouth in the morning and 1 mg before bedtime. Historical Provider, MD   pantoprazole (PROTONIX) 40 MG tablet TAKE 1 TABLET(40 MG) BY MOUTH DAILY 7/23/21   Ruperto Adam MD   warfarin (COUMADIN) 5 MG tablet One tab daily, as directed  On Saturday 7/13 and Sunday 7/14 take only 1/2 tabs. Get INR on Monday 7/15  Patient taking differently: Take 4 mg by mouth in the morning. Managed by [facility]. 7/11/19   SHAMAR Cuadra CNP   zolpidem (AMBIEN) 10 MG tablet nightly as needed . 7/11/14   Historical Provider, MD   atorvastatin (LIPITOR) 10 MG tablet Take 1 tablet by mouth daily. 2/12/14   SHAMAR Walker CNP   aspirin EC 81 MG EC tablet Take 1 tablet by mouth daily. 10/21/13   SHAMAR Walker CNP        Allergies:  Patient has no known allergies. ROS:   Review of Systems   Constitutional: Negative. Respiratory:  Positive for shortness of breath. Cardiovascular:  Positive for leg swelling. Gastrointestinal: Negative. Genitourinary: Negative. Musculoskeletal: Negative. Skin: Negative. Neurological: Negative. Hematological: Negative. Psychiatric/Behavioral: Negative.        Physical Examination:    Vitals:    11/15/22 1058   BP: 126/76   Site: Right Upper Arm   Position: Sitting   Cuff Size: Large Adult   Pulse: 72   SpO2: 94%   Weight: (!) 314 lb (142.4 kg) Height: 5' 10\" (1.778 m)           Physical Exam  Vitals reviewed. Constitutional:       Appearance: Normal appearance. He is normal weight. HENT:      Head: Normocephalic and atraumatic. Eyes:      Extraocular Movements: Extraocular movements intact. Pupils: Pupils are equal, round, and reactive to light. Cardiovascular:      Rate and Rhythm: Normal rate. Rhythm irregular. Pulses: Normal pulses. Heart sounds: Normal heart sounds. Pulmonary:      Effort: Pulmonary effort is normal.      Breath sounds: Normal breath sounds. Abdominal:      Palpations: Abdomen is soft. Musculoskeletal:         General: Normal range of motion. Cervical back: Normal range of motion and neck supple. Right lower leg: Edema present. Left lower leg: Edema present. Comments: trace   Skin:     General: Skin is warm and dry. Neurological:      General: No focal deficit present. Mental Status: He is alert and oriented to person, place, and time. Mental status is at baseline. Psychiatric:         Mood and Affect: Mood normal.         Behavior: Behavior normal.         Thought Content:  Thought content normal.         Judgment: Judgment normal.       Lab Data:    CBC:   Lab Results   Component Value Date/Time    WBC 8.0 07/25/2022 10:41 AM    WBC 9.7 02/05/2021 03:35 PM    WBC 5.9 12/17/2020 11:20 AM    RBC 4.73 07/25/2022 10:41 AM    RBC 4.51 02/05/2021 03:35 PM    RBC 3.79 12/17/2020 11:20 AM    HGB 12.8 07/25/2022 10:41 AM    HGB 12.6 02/05/2021 03:35 PM    HGB 10.9 12/17/2020 11:20 AM    HCT 39.2 07/25/2022 10:41 AM    HCT 39.5 02/05/2021 03:35 PM    HCT 33.9 12/28/2020 08:45 AM    MCV 82.9 07/25/2022 10:41 AM    MCV 87.6 02/05/2021 03:35 PM    MCV 88.2 12/17/2020 11:20 AM    RDW 17.6 07/25/2022 10:41 AM    RDW 15.2 02/05/2021 03:35 PM    RDW 15.5 12/17/2020 11:20 AM     07/25/2022 10:41 AM     02/05/2021 03:35 PM     12/17/2020 11:20 AM     BMP:  Lab Results Component Value Date/Time     07/25/2022 10:41 AM     07/06/2021 11:12 AM     06/14/2021 08:35 AM    K 4.8 07/25/2022 10:41 AM    K 5.4 07/06/2021 11:12 AM    K 4.6 06/14/2021 08:35 AM     07/25/2022 10:41 AM     07/06/2021 11:12 AM     06/14/2021 08:35 AM    CO2 24 07/25/2022 10:41 AM    CO2 22 07/06/2021 11:12 AM    CO2 22 06/14/2021 08:35 AM    PHOS 3.2 07/09/2019 07:20 AM    BUN 22 07/25/2022 10:41 AM    BUN 27 07/06/2021 11:12 AM    BUN 24 06/14/2021 08:35 AM    CREATININE 1.2 07/25/2022 10:41 AM    CREATININE 1.5 07/06/2021 11:12 AM    CREATININE 1.5 06/14/2021 08:35 AM     BNP:   Lab Results   Component Value Date/Time    PROBNP 2,121 07/25/2022 10:41 AM    PROBNP 2,028 06/16/2021 10:45 AM    PROBNP 2,377 02/05/2021 03:35 PM     Iron Studies:  No components found for: FE,  TIBC,  FERRITIN      Assessment/Plan:    1. Chronic atrial fibrillation (HCC) - rate controlled on BB, cardizem, on AC   2. Essential hypertension - controlled   3. CHF - overloaded, start jardiance, labs today, may need to increase lasix as well         Instructions:   Medications: add jardiance one pill once a day, continue furosemide at 20mg once a day, weigh daily and you should see your wt come down 3-5pounds. If jardiance is not affordable or it does not help breathing then increase lasix to 20mg once a day alternating with 40mg once a day  Labs: today  Lifestyle Recommendations: Weigh yourself every day in the morning after urination, call Milagro Tellez if wt increases 2-3lb in one day or 5lb in one week, Limit sodium to 2000mg/day and fluids to 2L or 64oz/day.    Follow up: 4- 6 weeks        [place] [facility]: [phone]      I appreciate the opportunity of cooperating in the care of this individual.    SHAMAR Benitez - CNP, CNP, 11/9/2022,11:38 AM

## 2022-11-15 ENCOUNTER — OFFICE VISIT (OUTPATIENT)
Dept: CARDIOLOGY CLINIC | Age: 72
End: 2022-11-15
Payer: COMMERCIAL

## 2022-11-15 ENCOUNTER — HOSPITAL ENCOUNTER (OUTPATIENT)
Age: 72
Discharge: HOME | End: 2022-11-15
Payer: COMMERCIAL

## 2022-11-15 VITALS
WEIGHT: 314 LBS | HEART RATE: 72 BPM | SYSTOLIC BLOOD PRESSURE: 126 MMHG | BODY MASS INDEX: 44.95 KG/M2 | DIASTOLIC BLOOD PRESSURE: 76 MMHG | OXYGEN SATURATION: 94 % | HEIGHT: 70 IN

## 2022-11-15 DIAGNOSIS — I50.32 CHRONIC DIASTOLIC HEART FAILURE (HCC): ICD-10-CM

## 2022-11-15 DIAGNOSIS — R06.09 DOE (DYSPNEA ON EXERTION): ICD-10-CM

## 2022-11-15 DIAGNOSIS — I48.20 CHRONIC ATRIAL FIBRILLATION (HCC): ICD-10-CM

## 2022-11-15 DIAGNOSIS — I50.32 CHRONIC DIASTOLIC HEART FAILURE (HCC): Primary | ICD-10-CM

## 2022-11-15 DIAGNOSIS — I10 ESSENTIAL HYPERTENSION: ICD-10-CM

## 2022-11-15 LAB
ANION GAP SERPL CALCULATED.3IONS-SCNC: 16 MMOL/L (ref 3–16)
BUN BLDV-MCNC: 18 MG/DL (ref 7–20)
CALCIUM SERPL-MCNC: 9.4 MG/DL (ref 8.3–10.6)
CHLORIDE BLD-SCNC: 105 MMOL/L (ref 99–110)
CO2: 22 MMOL/L (ref 21–32)
CREAT SERPL-MCNC: 1.4 MG/DL (ref 0.8–1.3)
GFR SERPL CREATININE-BSD FRML MDRD: 54 ML/MIN/{1.73_M2}
GLUCOSE BLD-MCNC: 149 MG/DL (ref 70–99)
POTASSIUM SERPL-SCNC: 5 MMOL/L (ref 3.5–5.1)
PRO-BNP: 3420 PG/ML (ref 0–124)
SODIUM BLD-SCNC: 143 MMOL/L (ref 136–145)

## 2022-11-15 PROCEDURE — 3078F DIAST BP <80 MM HG: CPT | Performed by: NURSE PRACTITIONER

## 2022-11-15 PROCEDURE — 3074F SYST BP LT 130 MM HG: CPT | Performed by: NURSE PRACTITIONER

## 2022-11-15 PROCEDURE — 83880 ASSAY OF NATRIURETIC PEPTIDE: CPT

## 2022-11-15 PROCEDURE — 36415 COLL VENOUS BLD VENIPUNCTURE: CPT

## 2022-11-15 PROCEDURE — 80048 BASIC METABOLIC PNL TOTAL CA: CPT

## 2022-11-15 PROCEDURE — 99214 OFFICE O/P EST MOD 30 MIN: CPT | Performed by: NURSE PRACTITIONER

## 2022-11-15 PROCEDURE — 1123F ACP DISCUSS/DSCN MKR DOCD: CPT | Performed by: NURSE PRACTITIONER

## 2022-11-15 NOTE — PATIENT INSTRUCTIONS
Instructions:   Medications: add jardiance one pill once a day, continue furosemide at 20mg once a day, weigh daily and you should see your wt come down 3-5pounds. If jardiance is not affordable or it does not help breathing then increase lasix to 20mg once a day alternating with 40mg once a day  Labs: today  Lifestyle Recommendations: Weigh yourself every day in the morning after urination, call Mickey Pettit if wt increases 2-3lb in one day or 5lb in one week, Limit sodium to 2000mg/day and fluids to 2L or 64oz/day.    Follow up: 4- 6 weeks        [facility]: [phone]

## 2022-11-16 ENCOUNTER — TELEPHONE (OUTPATIENT)
Dept: CARDIOLOGY CLINIC | Age: 72
End: 2022-11-16

## 2022-11-16 NOTE — TELEPHONE ENCOUNTER
----- Message from SHAMAR Nicolas CNP sent at 11/16/2022  9:08 AM EST -----  Fluid number is up which probably explains his SOB. No new orders other than instructions form office yesterday.  Ant Lopez

## 2022-12-15 RX ORDER — DILTIAZEM HYDROCHLORIDE 240 MG/1
CAPSULE, COATED, EXTENDED RELEASE ORAL
Qty: 90 CAPSULE | Refills: 0 | Status: SHIPPED | OUTPATIENT
Start: 2022-12-15

## 2022-12-15 NOTE — TELEPHONE ENCOUNTER
Prescription refill    Last OV:11/15/2022    Last Refill:09/15/2022    Labs:11/15/2022    Future Appt: 01/06/2023

## 2023-01-04 ASSESSMENT — ENCOUNTER SYMPTOMS: GASTROINTESTINAL NEGATIVE: 1

## 2023-01-04 NOTE — PROGRESS NOTES
[facility], [place], operated by [facility]   Congestive Heart Failure    PrimaryCare Doctor:  Ioana Weston MD  Primary Cardiologist: Obey Hurley       Chief Complaint:  SOB    History of Present Illness:  Rosalinda Sarkar is a 71 y.o. male with  PHTN, non-obstructive CAD, HTN, HLD, LUZ MARINA, & chronic atrial fibrillation who presents today for f/u. We started jardiance at his last OV  Today his SOB is much better and he has lost 16lb since starting the jardiance. He denies dizziness,  orthopnea has resolved and edema improved. He denies chest pain, palpitations, orthopnea, PND, exertional chest pressure/discomfort, fatigue, early saiety, syncope. ER Visit: No  Recent Hospitalization: No    Baseline Weight: 295-300  Wt Readings from Last 3 Encounters:   01/06/23 298 lb (135.2 kg)   11/15/22 (!) 314 lb (142.4 kg)   08/12/22 (!) 305 lb (138.3 kg)          EF: 60%  Cardiac Imaging:Echo 9/12/22:   Summary   Technically difficult study due to body habitus. Definity was administered. Suboptimal image quality. Left ventricular cavity size is normal with mild concentric left ventricular   hypertrophy. Ejection fraction is visually estimated to be 65%. No regional wall motion abnormalities are noted. Indeterminate findings in diastolic function due to atrial fibrillation. Left atrium is upper limits of normal.   The tricuspid valve was not well visualized. Mild tricuspid regurgitation. The right ventricle is normal in size with reduced function. TAPSE: 1.41 cm. RVS velocity: 12.0 cm/s. IVC size is normal (<2.1cm) and collapses > 50% with respiration consistent   with normal RA pressure (3mmHg). Estimated pulmonary artery systolic pressure is elevated at 43 mmHg assuming   a right atrial pressure of 3 mmHg. Lexiscan dorene 2/11/21 (for cp)  Summary    Normal LV size and systolic function. Normal myocardial perfusion study.     Mild apical thinning which is clinically nonsignificant       Stress Protocols Resting ECG    Normal sinus rhythm. Normal ECG. Resting HR:53 bpm        Resting BP:113/50 mmHg      ECHO 2/5/2021  -Normal left ventricle size, wall thickness, and systolic function with an estimated ejection fraction of 60%. Indeterminate diastolic function. -Mild mitral regurgitation.   -The left atrium is dilated. -Mild tricuspid regurgitation with PASP of 53 mmHg. This is suggestive of moderate pulmonary hypertension. The right ventricle is mildly dilated. The right atrial size is dilated. Activity: decreased   Can you walk 1-2 blocks or do a moderate amount of house/yard work? Yes      NYHA Class: II         Pt Education: The patient has received education on the following topics: dietary sodium restriction, heart failure medications, the importance of physical activity, symptom management and weight monitoring       Past Medical History:   has a past medical history of Arthritis, Atrial fibrillation ([facility] [address] CAD (coronary artery disease), Cancer ([facility] [address] Diabetes mellitus ([facility] [address] Hypercholesteremia, Hypertension, Obesity, Sleep apnea, Ulcer of pyloric antrum, and Wears glasses. Surgical History:   has a past surgical history that includes Diagnostic Cardiac Cath Lab Procedure (8/24/06); knee surgery; Colonoscopy; Upper gastrointestinal endoscopy (02/07/14); cyst removal; Upper gastrointestinal endoscopy (03/19/2018); Arm Surgery (Right, 1/25/2022); and Carpal tunnel release (Right, 1/25/2022). Social History:   reports that he has never smoked. He has never used smokeless tobacco. He reports current alcohol use of about 1.0 standard drink per week. He reports that he does not use drugs. Family History:   Family History   Problem Relation Age of Onset    Heart Disease Mother     Cancer Father     Cancer Sister         breast       HomeMedications:  Prior to Admission medications    Medication Sig Start Date End Date Taking?  Authorizing Provider   dilTIAZem (CARDIZEM CD) 240 MG extended release capsule TAKE 1 CAPSULE BY MOUTH DAILY 12/15/22   SHAMAR Banks CNP   empagliflozin (JARDIANCE) 10 MG tablet Take 1 tablet by mouth daily 11/15/22   SHAMAR Banks CNP   metoprolol tartrate (LOPRESSOR) 25 MG tablet TAKE 1 TABLET BY MOUTH TWICE DAILY 10/25/22   Chinedu Ariza MD   Cholecalciferol (VITAMIN D) 50 MCG (2000 UT) CAPS capsule Take by mouth    Historical Provider, MD   spironolactone (ALDACTONE) 25 MG tablet TAKE 1 TABLET BY MOUTH DAILY 6/20/22   SHAMAR Banks CNP   furosemide (LASIX) 20 MG tablet TAKE 1 TABLET BY MOUTH DAILY OR AS DIRECTED 6/20/22   SHAMAR Banks CNP   glimepiride (AMARYL) 1 MG tablet Take 1 mg by mouth in the morning and 1 mg before bedtime. Historical Provider, MD   pantoprazole (PROTONIX) 40 MG tablet TAKE 1 TABLET(40 MG) BY MOUTH DAILY 7/23/21   Gustavo Franklin MD   warfarin (COUMADIN) 5 MG tablet One tab daily, as directed  On Saturday 7/13 and Sunday 7/14 take only 1/2 tabs. Get INR on Monday 7/15  Patient taking differently: Take 4 mg by mouth daily Managed by [facility] 7/11/19   HSAMAR Cuadra CNP   zolpidem (AMBIEN) 10 MG tablet nightly as needed . 7/11/14   Historical Provider, MD   atorvastatin (LIPITOR) 10 MG tablet Take 1 tablet by mouth daily. 2/12/14   SHAMAR Walker CNP   aspirin EC 81 MG EC tablet Take 1 tablet by mouth daily. 10/21/13   SHAMAR Walker CNP        Allergies:  Patient has no known allergies. ROS:   Review of Systems   Constitutional: Negative. Respiratory: Negative. Cardiovascular: Negative. Gastrointestinal: Negative. Genitourinary: Negative. Musculoskeletal: Negative. Skin: Negative. Neurological: Negative. Hematological: Negative. Psychiatric/Behavioral: Negative.        Physical Examination:    Vitals:    01/06/23 1415   BP: 118/60   Pulse: 65   SpO2: 97%   Weight: 298 lb (135.2 kg)   Height: 5' 10\" (1.778 m)           Physical Exam  Vitals reviewed. Constitutional:       Appearance: Normal appearance. He is normal weight. HENT:      Head: Normocephalic and atraumatic. Eyes:      Extraocular Movements: Extraocular movements intact. Pupils: Pupils are equal, round, and reactive to light. Cardiovascular:      Rate and Rhythm: Normal rate. Rhythm irregular. Pulses: Normal pulses. Heart sounds: Normal heart sounds. Pulmonary:      Effort: Pulmonary effort is normal.      Breath sounds: Normal breath sounds. Abdominal:      Palpations: Abdomen is soft. Musculoskeletal:         General: Normal range of motion. Cervical back: Normal range of motion and neck supple. Right lower leg: Edema present. Left lower leg: Edema present. Comments: trace   Skin:     General: Skin is warm and dry. Neurological:      General: No focal deficit present. Mental Status: He is alert and oriented to person, place, and time. Mental status is at baseline. Psychiatric:         Mood and Affect: Mood normal.         Behavior: Behavior normal.         Thought Content:  Thought content normal.         Judgment: Judgment normal.       Lab Data:    CBC:   Lab Results   Component Value Date/Time    WBC 8.0 07/25/2022 10:41 AM    WBC 9.7 02/05/2021 03:35 PM    WBC 5.9 12/17/2020 11:20 AM    RBC 4.73 07/25/2022 10:41 AM    RBC 4.51 02/05/2021 03:35 PM    RBC 3.79 12/17/2020 11:20 AM    HGB 12.8 07/25/2022 10:41 AM    HGB 12.6 02/05/2021 03:35 PM    HGB 10.9 12/17/2020 11:20 AM    HCT 39.2 07/25/2022 10:41 AM    HCT 39.5 02/05/2021 03:35 PM    HCT 33.9 12/28/2020 08:45 AM    MCV 82.9 07/25/2022 10:41 AM    MCV 87.6 02/05/2021 03:35 PM    MCV 88.2 12/17/2020 11:20 AM    RDW 17.6 07/25/2022 10:41 AM    RDW 15.2 02/05/2021 03:35 PM    RDW 15.5 12/17/2020 11:20 AM     07/25/2022 10:41 AM     02/05/2021 03:35 PM     12/17/2020 11:20 AM     BMP:  Lab Results   Component Value Date/Time     11/15/2022 11:49 AM    NA 143 07/25/2022 10:41 AM     07/06/2021 11:12 AM    K 5.0 11/15/2022 11:49 AM    K 4.8 07/25/2022 10:41 AM    K 5.4 07/06/2021 11:12 AM     11/15/2022 11:49 AM     07/25/2022 10:41 AM     07/06/2021 11:12 AM    CO2 22 11/15/2022 11:49 AM    CO2 24 07/25/2022 10:41 AM    CO2 22 07/06/2021 11:12 AM    PHOS 3.2 07/09/2019 07:20 AM    BUN 18 11/15/2022 11:49 AM    BUN 22 07/25/2022 10:41 AM    BUN 27 07/06/2021 11:12 AM    CREATININE 1.4 11/15/2022 11:49 AM    CREATININE 1.2 07/25/2022 10:41 AM    CREATININE 1.5 07/06/2021 11:12 AM     BNP:   Lab Results   Component Value Date/Time    PROBNP 3,420 11/15/2022 11:49 AM    PROBNP 2,121 07/25/2022 10:41 AM    PROBNP 2,028 06/16/2021 10:45 AM     Iron Studies:  No components found for: FE,  TIBC,  FERRITIN      Assessment/Plan:    1. Chronic atrial fibrillation (HCC) - rate controlled on BB, cardizem, on AC   2. Essential hypertension - controlled   3. CHF - compensated, continue jardiance, labs today         Instructions:   Medications: no change in meds  Labs: today  Lifestyle Recommendations: Weigh yourself every day in the morning after urination, call [facility] [employer] if wt increases 2-3lb in one day or 5lb in one week, Limit sodium to 2000mg/day and fluids to 2L or 64oz/day.    Follow up:3-4 months        [place] [facility]: [phone]      I appreciate the opportunity of cooperating in the care of this individual.    SHAMAR Hayes - CNP, CNP, 1/4/2023,12:41 PM

## 2023-01-06 ENCOUNTER — HOSPITAL ENCOUNTER (OUTPATIENT)
Age: 73
Discharge: HOME | End: 2023-01-06
Payer: COMMERCIAL

## 2023-01-06 ENCOUNTER — OFFICE VISIT (OUTPATIENT)
Dept: CARDIOLOGY CLINIC | Age: 73
End: 2023-01-06

## 2023-01-06 VITALS
HEIGHT: 70 IN | HEART RATE: 65 BPM | DIASTOLIC BLOOD PRESSURE: 60 MMHG | SYSTOLIC BLOOD PRESSURE: 118 MMHG | OXYGEN SATURATION: 97 % | WEIGHT: 298 LBS | BODY MASS INDEX: 42.66 KG/M2

## 2023-01-06 DIAGNOSIS — I50.32 CHRONIC DIASTOLIC HEART FAILURE (HCC): Primary | ICD-10-CM

## 2023-01-06 DIAGNOSIS — I48.20 CHRONIC ATRIAL FIBRILLATION (HCC): ICD-10-CM

## 2023-01-06 DIAGNOSIS — I50.32 CHRONIC DIASTOLIC HEART FAILURE (HCC): ICD-10-CM

## 2023-01-06 DIAGNOSIS — I10 ESSENTIAL HYPERTENSION: ICD-10-CM

## 2023-01-06 LAB
ANION GAP SERPL CALCULATED.3IONS-SCNC: 11 MMOL/L (ref 3–16)
BUN BLDV-MCNC: 23 MG/DL (ref 7–20)
CALCIUM SERPL-MCNC: 9 MG/DL (ref 8.3–10.6)
CHLORIDE BLD-SCNC: 101 MMOL/L (ref 99–110)
CO2: 24 MMOL/L (ref 21–32)
CREAT SERPL-MCNC: 1.4 MG/DL (ref 0.8–1.3)
GFR SERPL CREATININE-BSD FRML MDRD: 53 ML/MIN/{1.73_M2}
GLUCOSE BLD-MCNC: 185 MG/DL (ref 70–99)
POTASSIUM SERPL-SCNC: 4.1 MMOL/L (ref 3.5–5.1)
PRO-BNP: 1543 PG/ML (ref 0–124)
SODIUM BLD-SCNC: 136 MMOL/L (ref 136–145)

## 2023-01-06 PROCEDURE — 83880 ASSAY OF NATRIURETIC PEPTIDE: CPT

## 2023-01-06 PROCEDURE — 80048 BASIC METABOLIC PNL TOTAL CA: CPT

## 2023-01-06 PROCEDURE — 36415 COLL VENOUS BLD VENIPUNCTURE: CPT

## 2023-01-06 ASSESSMENT — ENCOUNTER SYMPTOMS: RESPIRATORY NEGATIVE: 1

## 2023-01-06 NOTE — PATIENT INSTRUCTIONS
Instructions:   Medications: no change in meds  Labs: today  Lifestyle Recommendations: Weigh yourself every day in the morning after urination, call Kim Vang if wt increases 2-3lb in one day or 5lb in one week, Limit sodium to 2000mg/day and fluids to 2L or 64oz/day.    Follow up:3-4 months        [facility]F Resource Line: [phone]

## 2023-01-10 ENCOUNTER — TELEPHONE (OUTPATIENT)
Dept: CARDIOLOGY CLINIC | Age: 73
End: 2023-01-10

## 2023-01-10 NOTE — TELEPHONE ENCOUNTER
----- Message from SHAMAR Hoffmann CNP sent at 1/9/2023  8:32 AM EST -----  Labs look great, no new orders.  Sylvester Parker

## 2023-03-01 NOTE — PROGRESS NOTES
Patient returns today for 4 right cubital and carpal tunnel syndrome and left the carpal tunnel syndrome. He is gone lateral release in the past from injections in the carpal tunnel. But this is coming back now he was injected last in March. He is about to go to [place] would like another injection but thinks come fall he would like to have these addressed surgically. This does wake him at night and if he uses computer very long his hand goes numb as well. ROS: Pertinent items are noted in HPI. No notes on file    Past Medical History:  No date: Atrial fibrillation ([facility] [address]  No date: CAD (coronary artery disease)  No date: Cancer ([facility] [address]      Comment:  skin  No date: Diabetes mellitus ([facility] [address]  No date: Hypercholesteremia  No date: Hypertension  No date: Obesity  No date: Sleep apnea  2/2014: Ulcer of pyloric antrum      Comment:  anemia/GI bleed while on NSAIDS     Past Surgical History:  No date: COLONOSCOPY  No date: CYST REMOVAL  8/24/06: DIAGNOSTIC CARDIAC CATH LAB PROCEDURE  No date: KNEE SURGERY      Comment:  arthroscopy bilateral  02/07/14: UPPER GASTROINTESTINAL ENDOSCOPY      Comment:  EGD/Colonoscopy with biopsy of ulcer  03/19/2018: UPPER GASTROINTESTINAL ENDOSCOPY      Comment:  with bx    Review of patient's family history indicates:  Problem: Heart Disease      Relation: Mother          Age of Onset: (Not Specified)  Problem: Cancer      Relation: Father          Age of Onset: (Not Specified)  Problem: Cancer      Relation: Sister          Age of Onset: (Not Specified)          Comment: breast      Social History    Socioeconomic History      Marital status:       Spouse name: None      Number of children: None      Years of education: None      Highest education level: None    Occupational History      None    Tobacco Use      Smoking status: Never Smoker      Smokeless tobacco: Never Used    Vaping Use      Vaping Use: Never used    Substance and Sexual Activity      Alcohol use:  Yes Alcohol/week: 1.0 standard drinks        Types: 1 Cans of beer per week        Comment: occas      Drug use: No      Sexual activity: Yes        Partners: Female    Other Topics      Concerns:        None    Social History Narrative      None    Social Determinants of Health  Financial Resource Strain:       Difficulty of Paying Living Expenses:   Food Insecurity:       Worried About Running Out of Food in the Last Year:       Ran Out of Food in the Last Year:   Transportation Needs:       Lack of Transportation (Medical):       Lack of Transportation (Non-Medical):   Physical Activity:       Days of Exercise per Week:       Minutes of Exercise per Session:   Stress:       Feeling of Stress :   Social Connections:       Frequency of Communication with Friends and Family:       Frequency of Social Gatherings with Friends and Family:       Attends Faith Services:       Active Member of Clubs or Organizations:       Attends Club or Organization Meetings:       Marital Status:   Intimate Partner Violence:       Fear of Current or Ex-Partner:       Emotionally Abused:       Physically Abused:       Sexually Abused:     Current Outpatient Medications:  pantoprazole (PROTONIX) 40 MG tablet, TAKE 1 TABLET(40 MG) BY MOUTH DAILY, Disp: 90 tablet, Rfl: 0  vitamin D3 (CHOLECALCIFEROL) 25 MCG (1000 UT) TABS tablet, Take 1 tablet by mouth daily, Disp: 90 tablet, Rfl: 0  spironolactone (ALDACTONE) 25 MG tablet, TAKE 1 TABLET BY MOUTH DAILY, Disp: 90 tablet, Rfl: 3  dilTIAZem (CARDIZEM CD) 240 MG extended release capsule, TAKE 1 CAPSULE BY MOUTH DAILY, Disp: 90 capsule, Rfl: 3  lisinopril-hydroCHLOROthiazide (PRINZIDE;ZESTORETIC) 20-12.5 MG per tablet, TAKE 1 TABLET BY MOUTH DAILY, Disp: 90 tablet, Rfl: 1  furosemide (LASIX) 20 MG tablet, Take 1 tablet by mouth daily Or as directed, Disp: 90 tablet, Rfl: 3  metoprolol tartrate (LOPRESSOR) 25 MG tablet, Take 1 tablet by mouth 2 times daily, Disp: 180 tablet, Rfl: 3  warfarin (COUMADIN) 5 MG tablet, One tab daily, as directedOn Saturday 7/13 and Sunday 7/14 take only 1/2 tabs. Get INR on Monday 7/15 (Patient taking differently: [facility], as directedOn Saturday 7/13 and Sunday 7/14 take only 1/2 tabs. Get INR on Monday 7/15), Disp: 30 tablet, Rfl: 3  metFORMIN (GLUCOPHAGE) 1000 MG tablet, Take 1,000 mg by mouth 2 times daily (with meals). , Disp: , Rfl:   zolpidem (AMBIEN) 10 MG tablet, nightly as needed . , Disp: , Rfl: 2  atorvastatin (LIPITOR) 10 MG tablet, Take 1 tablet by mouth daily. , Disp: 30 tablet, Rfl: 0  aspirin EC 81 MG EC tablet, Take 1 tablet by mouth daily. , Disp: 30 tablet, Rfl: 3    No current facility-administered medications for this visit. -- No Known Allergies     VITAL SIGNS:  Ht 5' 10\" (1.778 m)   Wt 292 lb (132.5 kg)   BMI 41.90 kg/m²   On examination today he has few clinical signs of this and that he has normal sensibility today has negative Tinel's negative Phalen's and negative compression test. He does have a subluxating ulnar nerve both left and right elbows. Previous EMG shows he has moderately severe both cubital and carpal tunnel tunnel on the right. Impression right upper extremity cubital tunnel and carpal tunnel syndromes. Plan: After sterile skin prep we injected the right carpal tunnel with 40 mg of Depo-Medrol and 5 mg ropivacaine. The patient tolerated this procedure well. He will be referred to Dr. Yolie Hernandez for definitive treatment. 19

## 2023-03-04 ENCOUNTER — APPOINTMENT (OUTPATIENT)
Dept: GENERAL RADIOLOGY | Age: 73
End: 2023-03-04
Payer: COMMERCIAL

## 2023-03-04 ENCOUNTER — APPOINTMENT (OUTPATIENT)
Dept: CT IMAGING | Age: 73
End: 2023-03-04
Payer: COMMERCIAL

## 2023-03-04 ENCOUNTER — HOSPITAL ENCOUNTER (EMERGENCY)
Age: 73
Discharge: HOME | End: 2023-03-04
Payer: COMMERCIAL

## 2023-03-04 VITALS
BODY MASS INDEX: 42.66 KG/M2 | WEIGHT: 298 LBS | HEIGHT: 70 IN | SYSTOLIC BLOOD PRESSURE: 124 MMHG | RESPIRATION RATE: 16 BRPM | DIASTOLIC BLOOD PRESSURE: 88 MMHG | OXYGEN SATURATION: 98 % | HEART RATE: 77 BPM | TEMPERATURE: 98 F

## 2023-03-04 DIAGNOSIS — Z79.01 ANTICOAGULATED ON COUMADIN: ICD-10-CM

## 2023-03-04 DIAGNOSIS — S22.41XA CLOSED FRACTURE OF MULTIPLE RIBS OF RIGHT SIDE, INITIAL ENCOUNTER: Primary | ICD-10-CM
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
## 2023-03-04 LAB
A/G RATIO: 1.1 (ref 1.1–2.2)
ALBUMIN SERPL-MCNC: 3.7 G/DL (ref 3.4–5)
ALP BLD-CCNC: 122 U/L (ref 40–129)
ALT SERPL-CCNC: 22 U/L (ref 10–40)
ANION GAP SERPL CALCULATED.3IONS-SCNC: 10 MMOL/L (ref 3–16)
APTT: 40.8 SEC (ref 23–34.3)
AST SERPL-CCNC: 25 U/L (ref 15–37)
BASOPHILS ABSOLUTE: 0.1 K/UL (ref 0–0.2)
BASOPHILS RELATIVE PERCENT: 1.1 %
BILIRUB SERPL-MCNC: 0.5 MG/DL (ref 0–1)
BUN BLDV-MCNC: 21 MG/DL (ref 7–20)
CALCIUM SERPL-MCNC: 9.1 MG/DL (ref 8.3–10.6)
CHLORIDE BLD-SCNC: 105 MMOL/L (ref 99–110)
CO2: 22 MMOL/L (ref 21–32)
CREAT SERPL-MCNC: 1.4 MG/DL (ref 0.8–1.3)
EOSINOPHILS ABSOLUTE: 0.3 K/UL (ref 0–0.6)
EOSINOPHILS RELATIVE PERCENT: 3.5 %
GFR SERPL CREATININE-BSD FRML MDRD: 53 ML/MIN/{1.73_M2}
GLUCOSE BLD-MCNC: 278 MG/DL (ref 70–99)
HCT VFR BLD CALC: 46 % (ref 40.5–52.5)
HEMOGLOBIN: 14.4 G/DL (ref 13.5–17.5)
INR BLD: 2.9 (ref 0.87–1.14)
LYMPHOCYTES ABSOLUTE: 1.4 K/UL (ref 1–5.1)
LYMPHOCYTES RELATIVE PERCENT: 18.9 %
MCH RBC QN AUTO: 26.6 PG (ref 26–34)
MCHC RBC AUTO-ENTMCNC: 31.4 G/DL (ref 31–36)
MCV RBC AUTO: 84.9 FL (ref 80–100)
MONOCYTES ABSOLUTE: 0.7 K/UL (ref 0–1.3)
MONOCYTES RELATIVE PERCENT: 9.3 %
NEUTROPHILS ABSOLUTE: 5 K/UL (ref 1.7–7.7)
NEUTROPHILS RELATIVE PERCENT: 67.2 %
PDW BLD-RTO: 16 % (ref 12.4–15.4)
PLATELET # BLD: 255 K/UL (ref 135–450)
PMV BLD AUTO: 8.2 FL (ref 5–10.5)
POTASSIUM SERPL-SCNC: 4.7 MMOL/L (ref 3.5–5.1)
PROTHROMBIN TIME: 30.5 SEC (ref 11.7–14.5)
RBC # BLD: 5.42 M/UL (ref 4.2–5.9)
SODIUM BLD-SCNC: 137 MMOL/L (ref 136–145)
TOTAL PROTEIN: 7.1 G/DL (ref 6.4–8.2)
WBC # BLD: 7.4 K/UL (ref 4–11)

## 2023-03-04 PROCEDURE — 36415 COLL VENOUS BLD VENIPUNCTURE: CPT

## 2023-03-04 PROCEDURE — 85610 PROTHROMBIN TIME: CPT

## 2023-03-04 PROCEDURE — 70450 CT HEAD/BRAIN W/O DYE: CPT

## 2023-03-04 PROCEDURE — 73030 X-RAY EXAM OF SHOULDER: CPT

## 2023-03-04 PROCEDURE — 85025 COMPLETE CBC W/AUTO DIFF WBC: CPT

## 2023-03-04 PROCEDURE — 80053 COMPREHEN METABOLIC PANEL: CPT

## 2023-03-04 PROCEDURE — 72125 CT NECK SPINE W/O DYE: CPT

## 2023-03-04 PROCEDURE — 71250 CT THORAX DX C-: CPT

## 2023-03-04 PROCEDURE — 99284 EMERGENCY DEPT VISIT MOD MDM: CPT

## 2023-03-04 PROCEDURE — 6370000000 HC RX 637 (ALT 250 FOR IP): Performed by: PHYSICIAN ASSISTANT

## 2023-03-04 PROCEDURE — 85730 THROMBOPLASTIN TIME PARTIAL: CPT

## 2023-03-04 RX ORDER — HYDROCODONE BITARTRATE AND ACETAMINOPHEN 5; 325 MG/1; MG/1
1 TABLET ORAL ONCE
Status: COMPLETED | OUTPATIENT
Start: 2023-03-04 | End: 2023-03-04

## 2023-03-04 RX ORDER — OXYCODONE HYDROCHLORIDE AND ACETAMINOPHEN 5; 325 MG/1; MG/1
1 TABLET ORAL EVERY 6 HOURS PRN
Qty: 12 TABLET | Refills: 0 | Status: SHIPPED | OUTPATIENT
Start: 2023-03-04 | End: 2023-03-07

## 2023-03-04 RX ORDER — LIDOCAINE 50 MG/G
1 PATCH TOPICAL DAILY
Qty: 30 PATCH | Refills: 0 | Status: SHIPPED | OUTPATIENT
Start: 2023-03-04

## 2023-03-04 RX ORDER — LIDOCAINE 4 G/G
1 PATCH TOPICAL ONCE
Status: DISCONTINUED | OUTPATIENT
Start: 2023-03-04 | End: 2023-03-04 | Stop reason: HOSPADM

## 2023-03-04 RX ADMIN — HYDROCODONE BITARTRATE AND ACETAMINOPHEN 1 TABLET: 5; 325 TABLET ORAL at 10:53

## 2023-03-04 ASSESSMENT — ENCOUNTER SYMPTOMS
NAUSEA: 0
VOMITING: 0
COUGH: 0
CONSTIPATION: 0
DIARRHEA: 0
BACK PAIN: 0
STRIDOR: 0
ABDOMINAL PAIN: 0
WHEEZING: 0
SHORTNESS OF BREATH: 0
COLOR CHANGE: 0

## 2023-03-04 ASSESSMENT — PAIN DESCRIPTION - LOCATION: LOCATION: ARM;LEG

## 2023-03-04 ASSESSMENT — PAIN SCALES - GENERAL
PAINLEVEL_OUTOF10: 8
PAINLEVEL_OUTOF10: 8

## 2023-03-04 ASSESSMENT — PAIN - FUNCTIONAL ASSESSMENT: PAIN_FUNCTIONAL_ASSESSMENT: 0-10

## 2023-03-04 ASSESSMENT — PAIN DESCRIPTION - ORIENTATION: ORIENTATION: RIGHT

## 2023-03-04 NOTE — ED PROVIDER NOTES
[facility]        Pt Name: Kristal Louis  MRN: [id number]  [place] 1950  Date of evaluation: 3/4/2023  Provider: Derrick Decker PA-C  PCP: Chad Bay MD  Note Started: 10:42 AM EST 3/4/23      AMRIK. I have evaluated this patient. My supervising physician was available for consultation. CHIEF COMPLAINT       Chief Complaint   Patient presents with    Fall     Patient states he fell down 4 steps this morning on his deck onto the mud. Gloriann Perches down onto his right side. Complaining of right side rib pain and right arm pain. Denies LOC. Denies hitting head. HISTORY OF PRESENT ILLNESS: 1 or more Elements     History from : Patient    Limitations to history : None    Kristal Louis is a 71 y.o. male who presents to the emergency department complaining of right-sided rib pain status post mechanical fall which occurred this morning. He was taking his dog outside. The dog was on the leash and saw a bird and ran off. This caused the patient to fall down 4 steps off of his deck into the mud. He landed on his right side. He is unsure of head trauma. Denies neck or back pain. Despite triage note, patient denies any significant right arm pain. Denies hip pain. He walked into the emergency department out difficulty. His wife drove him here. He rates his pain 8 out of 10 on pain scale. He has AFIB and takes coumadin. Nursing Notes were all reviewed and agreed with or any disagreements were addressed in the HPI. REVIEW OF SYSTEMS :      Review of Systems   Constitutional:  Negative for chills and fever. HENT: Negative. Eyes:  Negative for visual disturbance. Respiratory:  Negative for cough, shortness of breath, wheezing and stridor. Cardiovascular:  Positive for chest pain. Negative for palpitations and leg swelling.    Gastrointestinal:  Negative for abdominal pain, constipation, diarrhea, nausea and vomiting. Endocrine: Negative. Genitourinary: Negative. Musculoskeletal:  Positive for myalgias. Negative for back pain, neck pain and neck stiffness. Skin:  Negative for color change, pallor, rash and wound. Neurological: Negative. Psychiatric/Behavioral:  Negative for confusion. All other systems reviewed and are negative. Positives and Pertinent negatives as per HPI.      SURGICAL HISTORY     Past Surgical History:   Procedure Laterality Date    ARM SURGERY Right 1/25/2022    RIGHT ULNAR NERVE DECOMPRESSION (AT ELBOW) performed by Robbi Espinoza MD at [address] Right 1/25/2022    RIGHT CARPAL TUNNEL RELEASE performed by Robbi Espinoza MD at [facility]      from back side--fatty lipoma    DIAGNOSTIC CARDIAC CATH LAB PROCEDURE  8/24/06    KNEE SURGERY      arthroscopy bilateral    UPPER GASTROINTESTINAL ENDOSCOPY  02/07/14    EGD/Colonoscopy with biopsy of ulcer    UPPER GASTROINTESTINAL ENDOSCOPY  03/19/2018    with bx       CURRENTMEDICATIONS       Discharge Medication List as of 3/4/2023 11:52 AM        CONTINUE these medications which have NOT CHANGED    Details   dilTIAZem (CARDIZEM CD) 240 MG extended release capsule TAKE 1 CAPSULE BY MOUTH DAILY, Disp-90 capsule, R-0Normal      empagliflozin (JARDIANCE) 10 MG tablet Take 1 tablet by mouth daily, Disp-21 tablet, R-0Samples given: [id number], Exp Jan 25, 3 bottlesSample      metoprolol tartrate (LOPRESSOR) 25 MG tablet TAKE 1 TABLET BY MOUTH TWICE DAILY, Disp-180 tablet, R-3Normal      Cholecalciferol (VITAMIN D) 50 MCG (2000 UT) CAPS capsule Take by mouthHistorical Med      spironolactone (ALDACTONE) 25 MG tablet TAKE 1 TABLET BY MOUTH DAILY, Disp-90 tablet, R-0Normal      furosemide (LASIX) 20 MG tablet TAKE 1 TABLET BY MOUTH DAILY OR AS DIRECTED, Disp-90 tablet, R-0Normal      glimepiride (AMARYL) 1 MG tablet Take 1 mg by mouth in the morning and 1 mg before bedtime. Historical Med      pantoprazole (PROTONIX) 40 MG tablet TAKE 1 TABLET(40 MG) BY MOUTH DAILY, Disp-90 tablet, R-0Normal      warfarin (COUMADIN) 5 MG tablet One tab daily, as directed  On Saturday 7/13 and Sunday 7/14 take only 1/2 tabs. Get INR on Monday 7/15, Disp-30 tablet, R-3Print      zolpidem (AMBIEN) 10 MG tablet nightly as needed ., R-2Historical Med      atorvastatin (LIPITOR) 10 MG tablet Take 1 tablet by mouth daily. , Disp-30 tablet, R-0      aspirin EC 81 MG EC tablet Take 1 tablet by mouth daily. , Disp-30 tablet, R-3             ALLERGIES     Patient has no known allergies. FAMILYHISTORY       Family History   Problem Relation Age of Onset    Heart Disease Mother     Cancer Father     Cancer Sister         breast        SOCIAL HISTORY       Social History     Tobacco Use    Smoking status: Never    Smokeless tobacco: Never   Vaping Use    Vaping Use: Never used   Substance Use Topics    Alcohol use: Yes     Alcohol/week: 1.0 standard drink     Types: 1 Cans of beer per week     Comment: seldom    Drug use: Never       SCREENINGS        Meron Coma Scale  Eye Opening: Spontaneous  Best Verbal Response: Oriented  Best Motor Response: Obeys commands  [place] Coma Scale Score: 15                CIWA Assessment  BP: 124/88  Heart Rate: 77           PHYSICAL EXAM  1 or more Elements     ED Triage Vitals [03/04/23 1016]   BP Temp Temp Source Heart Rate Resp SpO2 Height Weight   124/88 98 °F (36.7 °C) Oral 77 16 98 % 5' 10\" (1.778 m) 298 lb (135.2 kg)       Physical Exam  Vitals and nursing note reviewed. Constitutional:       Appearance: Normal appearance. He is well-developed. He is obese. He is not toxic-appearing or diaphoretic. HENT:      Head: Normocephalic and atraumatic. Right Ear: External ear normal.      Left Ear: External ear normal.      Nose: Nose normal.      Mouth/Throat:      Mouth: Mucous membranes are moist.      Pharynx: Oropharynx is clear.    Eyes:      General: No scleral icterus. Right eye: No discharge. Left eye: No discharge. Extraocular Movements: Extraocular movements intact. Conjunctiva/sclera: Conjunctivae normal.      Pupils: Pupils are equal, round, and reactive to light. Cardiovascular:      Rate and Rhythm: Normal rate. Pulmonary:      Effort: Pulmonary effort is normal.      Breath sounds: No stridor. No wheezing, rhonchi or rales. Chest:      Chest wall: Tenderness (right lateral chest wall) present. Abdominal:      General: Bowel sounds are normal.      Palpations: Abdomen is soft. Tenderness: There is no abdominal tenderness. There is no right CVA tenderness or left CVA tenderness. Musculoskeletal:      Right shoulder: No swelling, deformity, effusion, laceration, tenderness, bony tenderness or crepitus. Decreased range of motion (increased pain in the right side of the chest with abduction of the right arm/shoulder). Normal strength. Normal pulse. Left shoulder: Normal.      Right upper arm: Normal.      Left upper arm: Normal.      Right elbow: Normal.      Left elbow: Normal.      Right forearm: Normal.      Left forearm: Normal.      Right wrist: Normal.      Left wrist: Normal.      Right hand: Normal.      Left hand: Normal.      Cervical back: Normal and normal range of motion. Thoracic back: Normal.      Lumbar back: Normal.      Right hip: Normal.      Left hip: Normal.      Right knee: Normal.      Left knee: Normal.      Right ankle: Normal.      Left ankle: Normal.      Right foot: Normal.      Left foot: Normal.   Skin:     General: Skin is warm and dry. Capillary Refill: Capillary refill takes less than 2 seconds. Coloration: Skin is not jaundiced or pale. Findings: No bruising, erythema, lesion or rash. Neurological:      General: No focal deficit present. Mental Status: He is alert and oriented to person, place, and time. Cranial Nerves: No cranial nerve deficit. Sensory: No sensory deficit. Motor: No weakness. Deep Tendon Reflexes: Reflexes normal.   Psychiatric:         Behavior: Behavior normal.       Upon reevaluation, patient is feeling better with oral percocet. DIAGNOSTIC RESULTS   LABS:    Labs Reviewed   CBC WITH AUTO DIFFERENTIAL - Abnormal; Notable for the following components:       Result Value    RDW 16.0 (*)     All other components within normal limits   COMPREHENSIVE METABOLIC PANEL - Abnormal; Notable for the following components:    Glucose 278 (*)     BUN 21 (*)     Creatinine 1.4 (*)     Est, Glom Filt Rate 53 (*)     All other components within normal limits   APTT - Abnormal; Notable for the following components:    aPTT 40.8 (*)     All other components within normal limits   PROTIME-INR - Abnormal; Notable for the following components:    Protime 30.5 (*)     INR 2.90 (*)     All other components within normal limits       When ordered only abnormal lab results are displayed. All other labs were within normal range or not returned as of this dictation. EKG: When ordered, EKG's are interpreted by the Emergency Department Physician in the absence of a cardiologist.  Please see their note for interpretation of EKG. RADIOLOGY:   Non-plain film images such as CT, Ultrasound and MRI are read by the radiologist. Plain radiographic images are visualized and preliminarily interpreted by the ED Provider with the below findings:    Right rib fracture seen on right sided on shoulder film. Interpretation per the Radiologist below, if available at the time of this note:    XR SHOULDER RIGHT (MIN 2 VIEWS)   Preliminary Result   Nondisplaced right 5th rib fracture. A clavicle fracture is not identified. CT CHEST ABDOMEN PELVIS WO CONTRAST Additional Contrast? None   Final Result   1. Nondisplaced right 4th and 5th rib fractures. 2. No acute abdominopelvic abnormality. 3. Nonobstructing bilateral nephrolithiasis.          CT CERVICAL SPINE WO CONTRAST   Preliminary Result   No acute intracranial abnormality. No evidence of acute fracture or dislocation of the cervical spine. CT HEAD WO CONTRAST   Preliminary Result   No acute intracranial abnormality. No evidence of acute fracture or dislocation of the cervical spine. PROCEDURES   Unless otherwise noted below, none     Procedures    CRITICAL CARE TIME (.cctime)   N/a    PAST MEDICAL HISTORY         Chronic Conditions affecting Care:  has a past medical history of Arthritis, Atrial fibrillation ([facility] [address] CAD (coronary artery disease), Cancer ([facility] [address] Diabetes mellitus ([facility] [address] Hypercholesteremia, Hypertension, Obesity, Sleep apnea, Ulcer of pyloric antrum (2/2014), and Wears glasses. EMERGENCY DEPARTMENT COURSE and DIFFERENTIAL DIAGNOSIS/MDM:   Vitals:    Vitals:    03/04/23 1016   BP: 124/88   Pulse: 77   Resp: 16   Temp: 98 °F (36.7 °C)   TempSrc: Oral   SpO2: 98%   Weight: 298 lb (135.2 kg)   Height: 5' 10\" (1.778 m)       Patient was given the following medications:  Medications   lidocaine 4 % external patch 1 patch (1 patch TransDERmal Patch Applied 3/4/23 1053)   HYDROcodone-acetaminophen (NORCO) 5-325 MG per tablet 1 tablet (1 tablet Oral Given 3/4/23 1053)             Is this patient to be included in the SEP-1 Core Measure due to severe sepsis or septic shock? No   Exclusion criteria - the patient is NOT to be included for SEP-1 Core Measure due to: Infection is not suspected    CONSULTS: (Who and What was discussed)  None  Discussion with Other Profesionals : None    Social Determinants : alcohol use    Records Reviewed : Outpatient Notes recent cardiology office visit 1/6/23    CC/HPI Summary, DDx, ED Course, and Reassessment: This patient presents complaining of right-sided chest wall pain status post mechanical fall. He is unsure of head trauma however is anticoagulated on Coumadin. He has history of atrial fibrillation.   Right shoulder film shows no acute osseous abnormality regarding the right shoulder or clavicle. CT scan of the head and cervical spine appear stable. CT scan of the chest abdomen and pelvis does confirm 2 right rib fractures without evidence of pneumothorax, hemothorax or overt pulmonary contusion. Patient is not hypoxic, tachypneic or tachycardic. He is anticoagulated on Coumadin with a therapeutic INR. The patient is advised to follow-up closely with PCP and have INR and hemoglobin checked this week. If symptoms worsen, he is urged to return to the emergency department. He will be sent home with incentive spirometer and medicine as needed for pain. My suspicion is low for ACS, PE, myocarditis, pericarditis, endocarditis, acute pulmonary edema, pleural effusion, pericardial effusion, cardiac tamponade, cardiomyopathy, CHF exacerbation, thoracic aortic dissection, esophageal rupture, other life-threatening arrhythmia, hypertensive urgency or emergency, hemothorax, pulmonary contusion, subcutaneous emphysema, flail chest, pneumo mediastinum, pneumonia, pneumothorax, COVID-19, pertussis, RSV, influenza, ARDS, severe asthma exacerbation, severe COPD exacerbation, acute intra-abdominal/retroperitoneal/intracranial injury/laceration/hematoma,  subungual hematoma, paronychia, eponychia, felon, flexor tenosynovitis, thoracic outlet obstruction, SVC syndrome, foreign body, tendon rupture, compartment syndrome, acute fracture, dislocation, DVT, arterial compromise or occlusion, limb ischemia, gout, septic joint, abscess, cellulitis, osteomyelitis, gonococcal arthritis,  avascular necrosis,acute spine fracture or dislocation, epidural abscess or hematoma, discitis, meningitis, encephalitis, transverse myelitis, cauda quina,  pyelonephritis, perinephric abscess, urosepsis, kidney stone, AAA, dissection, shingles, or other concerning pathology.           Disposition Considerations (include 1 Tests not done, Shared Decision Making, Pt Expectation of Test or Tx.): vitals are stable here without hypoxia. Patient has adequate pain control with oral medications. Appropriate for outpatient management follow up with PCP and may return to ED per discharge instructions. I am the Primary Clinician of Record. FINAL IMPRESSION      1. Closed fracture of multiple ribs of right side, initial encounter    2. Anticoagulated on Coumadin    3. Fall down steps, initial encounter          DISPOSITION/PLAN     DISPOSITION Decision To Discharge 03/04/2023 11:40:42 AM      PATIENT REFERRED TO:  David Bhatti MD  [address], [address]  [phone]      follow up in 1-3 days    [facility] Emergency Department  [address] [facility]  [phone]    If symptoms worsen    DISCHARGE MEDICATIONS:  Discharge Medication List as of 3/4/2023 11:52 AM        START taking these medications    Details   oxyCODONE-acetaminophen (PERCOCET) 5-325 MG per tablet Take 1 tablet by mouth every 6 hours as needed for Pain for up to 3 days. Intended supply: 3 days.  Take lowest dose possible to manage pain Max Daily Amount: 4 tablets, Disp-12 tablet, R-0Print      lidocaine (LIDODERM) 5 % Place 1 patch onto the skin daily 12 hours on, 12 hours off., Disp-30 patch, R-0Normal             DISCONTINUED MEDICATIONS:  Discharge Medication List as of 3/4/2023 11:52 AM                 (Please note that portions of this note were completed with a voice recognition program.  Efforts were made to edit the dictations but occasionally words are mis-transcribed.)    Nighat English PA-C (electronically signed)            Nighat English PA-C  03/04/23 4045

## 2023-03-17 RX ORDER — DILTIAZEM HYDROCHLORIDE 240 MG/1
CAPSULE, COATED, EXTENDED RELEASE ORAL
Qty: 90 CAPSULE | Refills: 0 | Status: SHIPPED | OUTPATIENT
Start: 2023-03-17

## 2023-04-05 ASSESSMENT — ENCOUNTER SYMPTOMS
RESPIRATORY NEGATIVE: 1
GASTROINTESTINAL NEGATIVE: 1

## 2023-04-05 NOTE — PROGRESS NOTES
Results   Component Value Date/Time     03/04/2023 10:55 AM     01/06/2023 03:05 PM     11/15/2022 11:49 AM    K 4.7 03/04/2023 10:55 AM    K 4.1 01/06/2023 03:05 PM    K 5.0 11/15/2022 11:49 AM     03/04/2023 10:55 AM     01/06/2023 03:05 PM     11/15/2022 11:49 AM    CO2 22 03/04/2023 10:55 AM    CO2 24 01/06/2023 03:05 PM    CO2 22 11/15/2022 11:49 AM    PHOS 3.2 07/09/2019 07:20 AM    BUN 21 03/04/2023 10:55 AM    BUN 23 01/06/2023 03:05 PM    BUN 18 11/15/2022 11:49 AM    CREATININE 1.4 03/04/2023 10:55 AM    CREATININE 1.4 01/06/2023 03:05 PM    CREATININE 1.4 11/15/2022 11:49 AM     BNP:   Lab Results   Component Value Date/Time    PROBNP 1,543 01/06/2023 03:05 PM    PROBNP 3,420 11/15/2022 11:49 AM    PROBNP 2,121 07/25/2022 10:41 AM     Iron Studies:  No components found for: FE,  TIBC,  FERRITIN      Assessment/Plan:    1. Chronic atrial fibrillation (HCC) - rate controlled on BB, cardizem, cont AC   2. Essential hypertension - controlled   3. CHF - compensated, continue jardiance and mahogany, will try lower dose of lasix         Instructions:   Medications: decrease furosemide to 20mg once a day and weigh daily, if your wt increases 3lb or more in one day then take 40mg that day  Lifestyle Recommendations: Weigh yourself every day in the morning after urination, call [facility] if wt increases 2-3lb in one day or 5lb in one week, Limit sodium to 2000mg/day and fluids to 2L or 64oz/day.    Follow up: 4 months        Saran: [phone]      I appreciate the opportunity of cooperating in the care of this individual.    SHAMAR Sneed - CNP, CNP, 4/5/2023,8:45 AM

## 2023-04-06 ENCOUNTER — OFFICE VISIT (OUTPATIENT)
Dept: CARDIOLOGY CLINIC | Age: 73
End: 2023-04-06
Payer: COMMERCIAL

## 2023-04-06 VITALS
DIASTOLIC BLOOD PRESSURE: 80 MMHG | BODY MASS INDEX: 43.09 KG/M2 | WEIGHT: 301 LBS | HEIGHT: 70 IN | SYSTOLIC BLOOD PRESSURE: 132 MMHG | OXYGEN SATURATION: 95 % | HEART RATE: 78 BPM

## 2023-04-06 DIAGNOSIS — I10 ESSENTIAL HYPERTENSION: Primary | ICD-10-CM

## 2023-04-06 DIAGNOSIS — I50.32 CHRONIC DIASTOLIC CONGESTIVE HEART FAILURE (HCC): ICD-10-CM

## 2023-04-06 DIAGNOSIS — I48.20 CHRONIC ATRIAL FIBRILLATION (HCC): ICD-10-CM

## 2023-04-06 PROCEDURE — 3075F SYST BP GE 130 - 139MM HG: CPT | Performed by: NURSE PRACTITIONER

## 2023-04-06 PROCEDURE — 99214 OFFICE O/P EST MOD 30 MIN: CPT | Performed by: NURSE PRACTITIONER

## 2023-04-06 PROCEDURE — 1123F ACP DISCUSS/DSCN MKR DOCD: CPT | Performed by: NURSE PRACTITIONER

## 2023-04-06 PROCEDURE — 3079F DIAST BP 80-89 MM HG: CPT | Performed by: NURSE PRACTITIONER

## 2023-04-06 RX ORDER — FUROSEMIDE 20 MG/1
20 TABLET ORAL DAILY
Qty: 90 TABLET | Refills: 0 | Status: SHIPPED | OUTPATIENT
Start: 2023-04-06

## 2023-04-06 NOTE — PATIENT INSTRUCTIONS
Instructions:   Medications: decrease furosemide to 20mg once a day and weigh daily, if your wt increases 3lb or more in one day then take 40mg that day  Lifestyle Recommendations: Weigh yourself every day in the morning after urination, call [facility] if wt increases 2-3lb in one day or 5lb in one week, Limit sodium to 2000mg/day and fluids to 2L or 64oz/day.    Follow up: 4 months        Saran: [phone]

## 2023-04-14 ENCOUNTER — TELEPHONE (OUTPATIENT)
Dept: CARDIOLOGY CLINIC | Age: 73
End: 2023-04-14

## 2023-04-18 NOTE — TELEPHONE ENCOUNTER
Contacted \"Jeanne\" via fax to inform her I do not have her release. Requested she refax it to [phone] so it can be forwarded to [facility].

## 2023-06-05 RX ORDER — EMPAGLIFLOZIN 10 MG/1
TABLET, FILM COATED ORAL
Qty: 90 TABLET | Refills: 0 | Status: SHIPPED | OUTPATIENT
Start: 2023-06-05

## 2023-07-03 RX ORDER — FUROSEMIDE 20 MG/1
20 TABLET ORAL DAILY
Qty: 90 TABLET | Refills: 0 | Status: SHIPPED | OUTPATIENT
Start: 2023-07-03

## 2023-07-17 NOTE — TELEPHONE ENCOUNTER
Faxed over cardiac clx
He should hold coumadin no longer than 3 days.
Pt is having an excision of a mass on his back
Spoke with tamiko from dr bhakta office, pt's surgery is 12/04/17.  I informed them KATHERIN is OOT
1.8

## 2023-08-09 ASSESSMENT — ENCOUNTER SYMPTOMS
RESPIRATORY NEGATIVE: 1
GASTROINTESTINAL NEGATIVE: 1

## 2023-08-09 NOTE — PROGRESS NOTES
[facility]   Congestive Heart Failure    PrimaryCare Doctor:  Olvin Rodriguez MD  Primary Cardiologist: Shelbi Pate       Chief Complaint:  SOB    History of Present Illness:  Gustavo Martinez is a 68 y.o. male with  PHTN, non-obstructive CAD, HTN, HLD, LUZ MARINA, & chronic atrial fibrillation who presents today for f/u. At his last OV, he was c/o frequent urination so we decreased furosemide to 20mg once a day awith prn extra 20 for wt gain  Today his SOB is a little better, he has lost more wt and feels well. He denies chest pain, palpitations, orthopnea, PND, exertional chest pressure/discomfort, fatigue, early saiety, syncope, or edema. ER Visit: No  Recent Hospitalization: No    Baseline Weight:losing wt  Wt Readings from Last 3 Encounters:   08/10/23 282 lb (127.9 kg)   04/06/23 (!) 301 lb (136.5 kg)   03/04/23 298 lb (135.2 kg)          EF: 60%  Cardiac Imaging:Echo 9/12/22:   Summary   Technically difficult study due to body habitus. Definity was administered. Suboptimal image quality. Left ventricular cavity size is normal with mild concentric left ventricular   hypertrophy. Ejection fraction is visually estimated to be 65%. No regional wall motion abnormalities are noted. Indeterminate findings in diastolic function due to atrial fibrillation. Left atrium is upper limits of normal.   The tricuspid valve was not well visualized. Mild tricuspid regurgitation. The right ventricle is normal in size with reduced function. TAPSE: 1.41 cm. RVS velocity: 12.0 cm/s. IVC size is normal (<2.1cm) and collapses > 50% with respiration consistent   with normal RA pressure (3mmHg). Estimated pulmonary artery systolic pressure is elevated at 43 mmHg assuming   a right atrial pressure of 3 mmHg. Lexiscan dorene 2/11/21 (for cp)  Summary    Normal LV size and systolic function. Normal myocardial perfusion study.     Mild apical thinning which is clinically nonsignificant       Stress

## 2023-08-10 ENCOUNTER — OFFICE VISIT (OUTPATIENT)
Dept: CARDIOLOGY CLINIC | Age: 73
End: 2023-08-10
Payer: COMMERCIAL

## 2023-08-10 VITALS
OXYGEN SATURATION: 95 % | WEIGHT: 282 LBS | HEART RATE: 67 BPM | BODY MASS INDEX: 40.37 KG/M2 | HEIGHT: 70 IN | DIASTOLIC BLOOD PRESSURE: 72 MMHG | SYSTOLIC BLOOD PRESSURE: 122 MMHG

## 2023-08-10 DIAGNOSIS — I10 ESSENTIAL HYPERTENSION: ICD-10-CM

## 2023-08-10 DIAGNOSIS — I48.20 CHRONIC ATRIAL FIBRILLATION (HCC): ICD-10-CM

## 2023-08-10 DIAGNOSIS — I50.32 CHRONIC DIASTOLIC CONGESTIVE HEART FAILURE (HCC): Primary | ICD-10-CM

## 2023-08-10 DIAGNOSIS — I25.10 ATHEROSCLEROSIS OF NATIVE CORONARY ARTERY OF NATIVE HEART WITHOUT ANGINA PECTORIS: ICD-10-CM

## 2023-08-10 PROCEDURE — 3074F SYST BP LT 130 MM HG: CPT | Performed by: NURSE PRACTITIONER

## 2023-08-10 PROCEDURE — 99214 OFFICE O/P EST MOD 30 MIN: CPT | Performed by: NURSE PRACTITIONER

## 2023-08-10 PROCEDURE — 1123F ACP DISCUSS/DSCN MKR DOCD: CPT | Performed by: NURSE PRACTITIONER

## 2023-08-10 PROCEDURE — 3078F DIAST BP <80 MM HG: CPT | Performed by: NURSE PRACTITIONER

## 2023-08-10 NOTE — PATIENT INSTRUCTIONS
Instructions:   Medications:  no change in meds  Will get from PCP  Lifestyle Recommendations: Weigh yourself every day in the morning after urination, call Ronny Yost if wt increases 2-3lb in one day or 5lb in one week, Limit sodium to 2000mg/day and fluids to 2L or 64oz/day.    Follow up: 4 -6 months with Dr. William Phillips: [phone]

## 2023-09-07 RX ORDER — DILTIAZEM HYDROCHLORIDE 240 MG/1
CAPSULE, COATED, EXTENDED RELEASE ORAL
Qty: 90 CAPSULE | Refills: 0 | Status: SHIPPED | OUTPATIENT
Start: 2023-09-07

## 2023-10-17 NOTE — TELEPHONE ENCOUNTER
Prescription refill    Last OV:08/10/2023    Last Refill:10/25/2022    Labs:08/10/2023    Future Appt: patient is on the recall list

## 2023-11-08 NOTE — PROGRESS NOTES
Patient reached __X__ yes  _____ no   VM instructions left ____ yes   phone number ________                                ____ no-office notified          Date __11/16/23_______  Time __0800_____  Arrival ___0630  hosp-endo___    Nothing to eat or drink after midnight-follow your doctors prep instructions-this may include taking a second dose of your prep after midnight  Responsible adult 25 or older to stay on site while you are here-drive you home-stay with you after  Follow any instructions your doctors office has given you  Bring a complete list of all your medications and supplements including name,dose,how often taken the day of your procedure  If you normally take the following medications in the morning please do so the AM of your procedure with a small sip of water       Heart,blood pressure,seizure,thyroid or breathing medications-use your inhalers-bring any rescue inhalers with you DOS       DO NOT take blood pressure medications ending in \"tano\" or \"pril\" the AM of procedure or evening prior  Dr Aspen Godinez patients are not to take any medications the AM of surgery  Take half or your normal dose of any long acting insulins the night before your procedure-do not take any diabetic medications the AM of procedure  Follow your doctors instructions regarding stopping or taking  any blood thinners-if you do not have instructions-call them-CHECK COUMADIN  Any questions call your doctor  Other _____take metoprolol, cardizem am of procedure_________________________________________________________      [facility] POLICY(subject to change)             The current policy is 2 visitors per patient. There are no children allowed. Mask at discretion of facility. Visiting hours are 8a-8p. Overnight visitors will be at the discretion of the nurse. All policies are subject to change.

## 2023-11-16 ENCOUNTER — HOSPITAL ENCOUNTER (OUTPATIENT)
Age: 73
Setting detail: OUTPATIENT SURGERY
Discharge: HOME | End: 2023-11-16
Attending: INTERNAL MEDICINE | Admitting: INTERNAL MEDICINE
Payer: COMMERCIAL

## 2023-11-16 ENCOUNTER — ANESTHESIA EVENT (OUTPATIENT)
Dept: ENDOSCOPY | Age: 73
End: 2023-11-16
Payer: COMMERCIAL

## 2023-11-16 ENCOUNTER — ANESTHESIA (OUTPATIENT)
Dept: ENDOSCOPY | Age: 73
End: 2023-11-16
Payer: COMMERCIAL

## 2023-11-16 VITALS
DIASTOLIC BLOOD PRESSURE: 78 MMHG | TEMPERATURE: 97.5 F | HEART RATE: 83 BPM | HEIGHT: 71 IN | OXYGEN SATURATION: 96 % | RESPIRATION RATE: 16 BRPM | WEIGHT: 290 LBS | SYSTOLIC BLOOD PRESSURE: 132 MMHG | BODY MASS INDEX: 40.6 KG/M2

## 2023-11-16 DIAGNOSIS — Z12.11 COLON CANCER SCREENING: ICD-10-CM

## 2023-11-16 LAB
GLUCOSE BLD-MCNC: 69 MG/DL (ref 70–99)
GLUCOSE BLD-MCNC: 77 MG/DL (ref 70–99)
GLUCOSE BLD-MCNC: 86 MG/DL (ref 70–99)
INR PPP: 1.1 (ref 0.84–1.16)
PERFORMED ON: ABNORMAL
PERFORMED ON: NORMAL
PERFORMED ON: NORMAL
PROTHROMBIN TIME: 14.2 SEC (ref 11.5–14.8)

## 2023-11-16 PROCEDURE — 7100000001 HC PACU RECOVERY - ADDTL 15 MIN: Performed by: INTERNAL MEDICINE

## 2023-11-16 PROCEDURE — 7100000000 HC PACU RECOVERY - FIRST 15 MIN: Performed by: INTERNAL MEDICINE

## 2023-11-16 PROCEDURE — 2500000003 HC RX 250 WO HCPCS: Performed by: NURSE ANESTHETIST, CERTIFIED REGISTERED

## 2023-11-16 PROCEDURE — 85610 PROTHROMBIN TIME: CPT

## 2023-11-16 PROCEDURE — 2580000003 HC RX 258: Performed by: ANESTHESIOLOGY

## 2023-11-16 PROCEDURE — 88305 TISSUE EXAM BY PATHOLOGIST: CPT

## 2023-11-16 PROCEDURE — 7100000011 HC PHASE II RECOVERY - ADDTL 15 MIN: Performed by: INTERNAL MEDICINE

## 2023-11-16 PROCEDURE — 3700000000 HC ANESTHESIA ATTENDED CARE: Performed by: INTERNAL MEDICINE

## 2023-11-16 PROCEDURE — 3609010600 HC COLONOSCOPY POLYPECTOMY SNARE/COLD BIOPSY: Performed by: INTERNAL MEDICINE

## 2023-11-16 PROCEDURE — 36415 COLL VENOUS BLD VENIPUNCTURE: CPT

## 2023-11-16 PROCEDURE — 6360000002 HC RX W HCPCS: Performed by: NURSE ANESTHETIST, CERTIFIED REGISTERED

## 2023-11-16 PROCEDURE — 3700000001 HC ADD 15 MINUTES (ANESTHESIA): Performed by: INTERNAL MEDICINE

## 2023-11-16 PROCEDURE — 2709999900 HC NON-CHARGEABLE SUPPLY: Performed by: INTERNAL MEDICINE

## 2023-11-16 PROCEDURE — 7100000010 HC PHASE II RECOVERY - FIRST 15 MIN: Performed by: INTERNAL MEDICINE

## 2023-11-16 RX ORDER — PROPOFOL 10 MG/ML
INJECTION, EMULSION INTRAVENOUS PRN
Status: DISCONTINUED | OUTPATIENT
Start: 2023-11-16 | End: 2023-11-16 | Stop reason: SDUPTHER

## 2023-11-16 RX ORDER — SODIUM CHLORIDE 9 MG/ML
INJECTION, SOLUTION INTRAVENOUS CONTINUOUS
Status: DISCONTINUED | OUTPATIENT
Start: 2023-11-16 | End: 2023-11-16 | Stop reason: HOSPADM

## 2023-11-16 RX ORDER — LIDOCAINE HYDROCHLORIDE 20 MG/ML
INJECTION, SOLUTION INFILTRATION; PERINEURAL PRN
Status: DISCONTINUED | OUTPATIENT
Start: 2023-11-16 | End: 2023-11-16 | Stop reason: SDUPTHER

## 2023-11-16 RX ADMIN — LIDOCAINE HYDROCHLORIDE 40 MG: 20 INJECTION, SOLUTION INFILTRATION; PERINEURAL at 08:24

## 2023-11-16 RX ADMIN — SODIUM CHLORIDE: 9 INJECTION, SOLUTION INTRAVENOUS at 07:26

## 2023-11-16 RX ADMIN — PROPOFOL 100 MCG/KG/MIN: 10 INJECTION, EMULSION INTRAVENOUS at 08:25

## 2023-11-16 RX ADMIN — PROPOFOL 50 MG: 10 INJECTION, EMULSION INTRAVENOUS at 08:24

## 2023-11-16 ASSESSMENT — PAIN - FUNCTIONAL ASSESSMENT: PAIN_FUNCTIONAL_ASSESSMENT: NONE - DENIES PAIN

## 2023-11-16 NOTE — PROGRESS NOTES
Discharge instructions reviewed with patient/wife Evelia Hayes. All home medications have been reviewed, questions answered and patient verbalized understanding. Discharge instructions signed. Pt dc'd per wheelchair. Patient discharged home with belongings. Wife taking stable pt home.

## 2023-11-16 NOTE — ANESTHESIA POSTPROCEDURE EVALUATION
Department of Anesthesiology  Postprocedure Note    Patient: Sisi Castro  MRN: [id number]  YOB: 1950  Date of evaluation: 11/16/2023      Procedure Summary       Date: 11/16/23 Room / Location: [address] [place] [address]    Anesthesia Start: 0820 Anesthesia Stop: 4802    Procedure: COLONOSCOPY POLYPECTOMY SNARE/COLD BIOPSY Diagnosis:       Colon cancer screening      (Colon cancer screening [Z12.11])    Surgeons: Jose Stanford MD Responsible Provider: Jeromy Bae MD    Anesthesia Type: MAC ASA Status: 3            Anesthesia Type: MAC    Mei Phase I: Mei Score: 10    Mei Phase II: Mei Score: 10      Anesthesia Post Evaluation    Patient location during evaluation: PACU  Patient participation: complete - patient participated  Level of consciousness: awake  Airway patency: patent  Nausea & Vomiting: no vomiting  Complications: no  Cardiovascular status: hemodynamically stable  Respiratory status: acceptable  Hydration status: euvolemic

## 2023-11-16 NOTE — PROGRESS NOTES
Accu check 77. Patient asymptomatic and states this is normal range for him. Dr. Sharri Johnson notified/no new orders.

## 2023-11-16 NOTE — H&P
Gastroenterology Note                 Pre-operative History and Physical    Patient: Felipe Hope  : 1950  CSN:     History Obtained From:   Patient or guardian. HISTORY OF PRESENT ILLNESS:    The patient is a 68 y.o. male here for Endoscopy. Past Medical History:    Past Medical History:   Diagnosis Date    Arthritis     Atrial fibrillation (HCC)     CAD (coronary artery disease)     Cancer ([address])     skin    Diabetes mellitus ([address])     Hypercholesteremia     Hypertension     Obesity     Sleep apnea     does not use Cpap machine at present    Ulcer of pyloric antrum 2014    anemia/GI bleed while on NSAIDS    Wears glasses      Past Surgical History:    Past Surgical History:   Procedure Laterality Date    ARM SURGERY Right 2022    RIGHT ULNAR NERVE DECOMPRESSION (AT ELBOW) performed by Ortega Ocampo MD at [facility] Right 2022    RIGHT CARPAL TUNNEL RELEASE performed by Ortega Ocampo MD at [facility]      from back side--fatty lipoma    DIAGNOSTIC CARDIAC CATH LAB PROCEDURE  06    KNEE SURGERY      arthroscopy bilateral    UPPER GASTROINTESTINAL ENDOSCOPY  14    EGD/Colonoscopy with biopsy of ulcer    UPPER GASTROINTESTINAL ENDOSCOPY  2018    with bx     Medications Prior to Admission:   No current facility-administered medications on file prior to encounter.      Current Outpatient Medications on File Prior to Encounter   Medication Sig Dispense Refill    metoprolol tartrate (LOPRESSOR) 25 MG tablet TAKE 1 TABLET BY MOUTH TWICE DAILY 180 tablet 3    dilTIAZem (CARDIZEM CD) 240 MG extended release capsule TAKE 1 CAPSULE BY MOUTH DAILY 90 capsule 0    furosemide (LASIX) 20 MG tablet TAKE 1 TABLET BY MOUTH DAILY OR AS DIRECTED 90 tablet 0    empagliflozin (JARDIANCE) 10 MG tablet TAKE 1 TABLET BY MOUTH DAILY 90 tablet 0    Cholecalciferol (VITAMIN D) 50 MCG ( UT) CAPS capsule Take by mouth

## 2023-11-16 NOTE — PROGRESS NOTES
Reviewed patient's medical and surgical history in electronic record and with patient at the bedside. All questions regarding procedure answered. Scope number and equipment verified using a two person system. Family in waiting room.     Electronically signed by Britton Donis RN on 11/16/2023 at 8:25 AM

## 2023-11-16 NOTE — DISCHARGE INSTRUCTIONS
Please call Dr. Mami Zuluaga office for scheduling of follow up appointments, biopsy results (in 5 business days), or any complications. Office phone number: [phone]     ENDOSCOPY [address] may experience some lightheadedness for the next several hours. Plan on quiet relaxation for the rest of today. A responsible adult needs to stay with you today. Because of the medications you received today-do not drive,operate machinery,or sign any contractual agreement for the next 24 hours. Do not drink any alcoholic beverages or take any unprescribed medications tonight. Eat bland food and avoid anything greasy or spicy initially-progress to your normal diet gradually. Diet restrictions as instructed. You may resume home medications as instructed. It is not unusual to experience some mild cramping or gas pains, and you may not have a bowel movement for several days. If you have any of the following problems, notify your physician or return to the hospital emergency room : fever, chills, excessive bleeding, excessive vomiting, difficulty swallowing, uncontrolled pain, increased abdominal distention, shortness of breath or any other problems. If you had a polyp removed, avoid strenuous activity for 48 hours. Avoid the use of aspirin or related compounds for one week, unless otherwise instructed by your physician. You may notice a small amount of blood in your next few bowel movements, but if a large amount passes, call your physician. ANESTHESIA DISCHARGE INSTRUCTIONS    Wear your seatbelt home. You are under the influence of drugs-do not drink alcohol,drive,operate machinery,or make any important decisions or sign any legal documentsfor 24 hours  A responsible adult needs to be with you for 24 hours. You may experience lightheadedness,dizziness,or sleepiness following surgery. Rest at home today- increase activity as tolerated.   Progress slowly to a regular diet unless your physician has

## 2023-11-16 NOTE — ANESTHESIA PRE PROCEDURE
Department of Anesthesiology  Preprocedure Note       Name:  Riky Dc   Age:  68 y.o.  :  1950                                          MRN:  [id number]         Date:  2023      Surgeon: Bk Ochoa):  Luther Ureña MD    Procedure: Procedure(s):  COLONOSCOPY DIAGNOSTIC    Medications prior to admission:   Prior to Admission medications    Medication Sig Start Date End Date Taking? Authorizing Provider   metoprolol tartrate (LOPRESSOR) 25 MG tablet TAKE 1 TABLET BY MOUTH TWICE DAILY 10/17/23   [facility] APRCARLITO - CNP   dilTIAZem (CARDIZEM CD) 240 MG extended release capsule TAKE 1 CAPSULE BY MOUTH DAILY 23   [facility] APRCARLITO  CNP   furosemide (LASIX) 20 MG tablet TAKE 1 TABLET BY MOUTH DAILY OR AS DIRECTED 7/3/23   [facility] APRCARLITO  CNP   empagliflozin (JARDIANCE) 10 MG tablet TAKE 1 TABLET BY MOUTH DAILY 23   SHAMAR Fierro - CNS   Cholecalciferol (VITAMIN D) 50 MCG (2000) CAPS capsule Take by mouth    Alis Rodriguez MD   spironolactone (ALDACTONE) 25 MG tablet TAKE 1 TABLET BY MOUTH DAILY 22   [facility] APRN - CNP   glimepiride (AMARYL) 1 MG tablet Take 1 tablet by mouth 2 times daily    ProviderAlis MD   pantoprazole (PROTONIX) 40 MG tablet TAKE 1 TABLET(40 MG) BY MOUTH DAILY 21   Kavitha Mccormack MD   warfarin (COUMADIN) 5 MG tablet One tab daily, as directed  On  and  take only 1/2 tabs. Get INR on Monday 7/15  Patient taking differently: Take 3 mg by mouth daily Managed by PCP 19   Dechristopher, Dorann Barthel, APRN - CNP   zolpidem (AMBIEN) 10 MG tablet nightly as needed . 14   Alis Rodriguez MD   atorvastatin (LIPITOR) 10 MG tablet Take 1 tablet by mouth daily.  14   SHAMAR Herrera - CNP   aspirin EC 81 MG EC tablet Take 1 tablet by mouth daily 10/21/13   Leti Henley APRN - CNP       Current medications:    Current Facility-Administered Medications   Medication Dose Route

## 2023-11-16 NOTE — PROGRESS NOTES
Pt arrived from endo to PACU bay 4. Report received from endo staff. Pt arouses easily to voice. Pt on RA, Afib, VSS. Will continue to monitor.

## 2024-01-23 NOTE — PROGRESS NOTES
Arteries: 2+ and equal  Pedal Pulses: 2+ and equal   Neck:  No thyromegaly  Abdomen: Obese  No masses or tenderness  Liver/Spleen: No Abnormalities Noted  Neurological/Psychiatric:  Alert and oriented in all spheres  Moves all extremities well  Exhibits normal gait balance and coordination  No abnormalities of mood, affect, memory, mentation, or behavior are noted    Diagnostic Testing:  Echo 9/12/22   Technically difficult study due to body habitus. Definity was administered.   Suboptimal image quality.   Left ventricular cavity size is normal with mild concentric left ventricular   hypertrophy.   Ejection fraction is visually estimated to be 65%.   No regional wall motion abnormalities are noted.   Indeterminate findings in diastolic function due to atrial fibrillation.   Left atrium is upper limits of normal.   The tricuspid valve was not well visualized.   Mild tricuspid regurgitation.   The right ventricle is normal in size with reduced function. TAPSE: 1.41 cm.   RVS velocity: 12.0 cm/s.   IVC size is normal (<2.1cm) and collapses > 50% with respiration consistent   with normal RA pressure (3mmHg).   Estimated pulmonary artery systolic pressure is elevated at 43 mmHg assuming   a right atrial pressure of 3 mmHg.      Lexiscan dorene 2/11/21 (for cp)  Summary    Normal LV size and systolic function.    Normal myocardial perfusion study.    Mild apical thinning which is clinically nonsignificant       Stress Protocols        Resting ECG    Normal sinus rhythm. Normal ECG.        Resting HR:53 bpm        Resting BP:113/50 mmHg     ECHO 2/5/2021  -Normal left ventricle size, wall thickness, and systolic function with an estimated ejection fraction of 60%. Indeterminate diastolic function.   -Mild mitral regurgitation.   -The left atrium is dilated.   -Mild tricuspid regurgitation with PASP of 53 mmHg. This is suggestive of moderate pulmonary hypertension.   The right ventricle is mildly dilated.   The right atrial

## 2024-01-24 ENCOUNTER — OFFICE VISIT (OUTPATIENT)
Dept: CARDIOLOGY CLINIC | Age: 74
End: 2024-01-24
Payer: COMMERCIAL

## 2024-01-24 VITALS
HEIGHT: 71 IN | WEIGHT: 275 LBS | BODY MASS INDEX: 38.5 KG/M2 | DIASTOLIC BLOOD PRESSURE: 64 MMHG | SYSTOLIC BLOOD PRESSURE: 110 MMHG | HEART RATE: 75 BPM | OXYGEN SATURATION: 96 %

## 2024-01-24 DIAGNOSIS — I10 ESSENTIAL HYPERTENSION: ICD-10-CM

## 2024-01-24 DIAGNOSIS — I48.20 CHRONIC ATRIAL FIBRILLATION (HCC): ICD-10-CM

## 2024-01-24 DIAGNOSIS — I25.10 ATHEROSCLEROSIS OF NATIVE CORONARY ARTERY OF NATIVE HEART WITHOUT ANGINA PECTORIS: ICD-10-CM

## 2024-01-24 DIAGNOSIS — R06.02 SOB (SHORTNESS OF BREATH): ICD-10-CM

## 2024-01-24 DIAGNOSIS — I50.32 CHRONIC DIASTOLIC CONGESTIVE HEART FAILURE (HCC): Primary | ICD-10-CM

## 2024-01-24 PROCEDURE — 1123F ACP DISCUSS/DSCN MKR DOCD: CPT | Performed by: INTERNAL MEDICINE

## 2024-01-24 PROCEDURE — 3078F DIAST BP <80 MM HG: CPT | Performed by: INTERNAL MEDICINE

## 2024-01-24 PROCEDURE — 99215 OFFICE O/P EST HI 40 MIN: CPT | Performed by: INTERNAL MEDICINE

## 2024-01-24 PROCEDURE — 3074F SYST BP LT 130 MM HG: CPT | Performed by: INTERNAL MEDICINE

## 2024-01-24 PROCEDURE — 93000 ELECTROCARDIOGRAM COMPLETE: CPT | Performed by: INTERNAL MEDICINE

## 2024-01-24 RX ORDER — SEMAGLUTIDE 0.68 MG/ML
INJECTION, SOLUTION SUBCUTANEOUS
COMMUNITY
Start: 2023-12-29

## 2024-01-24 RX ORDER — DILTIAZEM HYDROCHLORIDE 120 MG/1
120 CAPSULE, EXTENDED RELEASE ORAL DAILY
COMMUNITY

## 2024-01-24 NOTE — PATIENT INSTRUCTIONS
Plan:    1.   May drink a little more fluids due to taking Jardiance.     2.   We will get most recent labs from PCP.  If any labs are missing, we will call, Dr. Brown's office and add heart failure labs.    3.   EKG today.   4.   Keep up the weight loss.   5.   See Dr. Stokes in 6 months with an Echo

## 2024-02-12 ENCOUNTER — OFFICE VISIT (OUTPATIENT)
Dept: ORTHOPEDIC SURGERY | Age: 74
End: 2024-02-12
Payer: COMMERCIAL

## 2024-02-12 VITALS — HEIGHT: 71 IN | BODY MASS INDEX: 38.5 KG/M2 | RESPIRATION RATE: 18 BRPM | WEIGHT: 275 LBS

## 2024-02-12 DIAGNOSIS — G56.23 CUBITAL TUNNEL SYNDROME, BILATERAL: ICD-10-CM

## 2024-02-12 DIAGNOSIS — G56.03 BILATERAL CARPAL TUNNEL SYNDROME: Primary | ICD-10-CM

## 2024-02-12 PROCEDURE — 1123F ACP DISCUSS/DSCN MKR DOCD: CPT | Performed by: ORTHOPAEDIC SURGERY

## 2024-02-12 PROCEDURE — 99214 OFFICE O/P EST MOD 30 MIN: CPT | Performed by: ORTHOPAEDIC SURGERY

## 2024-02-12 NOTE — PROGRESS NOTES
Surgery will be scheduled and the patient is asked to call me if there are any additional questions. The patient understands that the surgery will be done by Dr. Yogi Murphy.  He is instructed regarding stopping his aspirin and warfarin before surgery, which he has done several times for recent procedures.  He will seek advise regarding this from his medical physician(s).

## 2024-02-13 ENCOUNTER — TELEPHONE (OUTPATIENT)
Dept: ORTHOPEDIC SURGERY | Age: 74
End: 2024-02-13

## 2024-02-14 ENCOUNTER — PREP FOR PROCEDURE (OUTPATIENT)
Dept: ORTHOPEDIC SURGERY | Age: 74
End: 2024-02-14

## 2024-02-14 DIAGNOSIS — G56.22 CUBITAL TUNNEL SYNDROME ON LEFT: ICD-10-CM

## 2024-02-14 DIAGNOSIS — G56.02 LEFT CARPAL TUNNEL SYNDROME: ICD-10-CM

## 2024-02-21 ENCOUNTER — TELEPHONE (OUTPATIENT)
Dept: ORTHOPEDIC SURGERY | Age: 74
End: 2024-02-21

## 2024-02-21 NOTE — TELEPHONE ENCOUNTER
Auth: NPR  Date: 3/14/2024  Reference # [id number]  Spoke with: NONE  Type of SX: OUTPATIENT  Location: [facility]  CPT: 36589 + 51489    DX: G56.22 + G56.02   SX area: [facility]  Insurance: [employer]

## 2024-03-14 ENCOUNTER — TELEPHONE (OUTPATIENT)
Dept: ORTHOPEDIC SURGERY | Age: 74
End: 2024-03-14

## 2024-03-14 NOTE — TELEPHONE ENCOUNTER
Surgery and/or Procedure Scheduling     Contact Name: Lawrence Rodriguez \"ÓSCAR\"   Surgical/Procedure Request: Sx on Lt Wrist   Patient Contact Number: [phone]     Patient is calling to see if he can get his surgery reschedule. Please Advise.

## 2024-03-20 ENCOUNTER — ANESTHESIA EVENT (OUTPATIENT)
Dept: OPERATING ROOM | Age: 74
End: 2024-03-20
Payer: COMMERCIAL

## 2024-03-20 NOTE — PROGRESS NOTES
Called patient surgery was rescheduled due to not stopping Ozempic for 7 days prior , called Dr Negro`s office spoke with Paulina, she is faxing H&P not in system yet and cardiac clearance with Dr Bustos in notes . Received H&P cleared.

## 2024-03-21 ENCOUNTER — ANESTHESIA (OUTPATIENT)
Dept: OPERATING ROOM | Age: 74
End: 2024-03-21
Payer: COMMERCIAL

## 2024-03-21 ENCOUNTER — HOSPITAL ENCOUNTER (OUTPATIENT)
Age: 74
Setting detail: OUTPATIENT SURGERY
Discharge: HOME | End: 2024-03-21
Attending: ORTHOPAEDIC SURGERY | Admitting: ORTHOPAEDIC SURGERY
Payer: COMMERCIAL

## 2024-03-21 VITALS
BODY MASS INDEX: 37.8 KG/M2 | SYSTOLIC BLOOD PRESSURE: 147 MMHG | TEMPERATURE: 97.3 F | WEIGHT: 270 LBS | HEIGHT: 71 IN | RESPIRATION RATE: 16 BRPM | DIASTOLIC BLOOD PRESSURE: 83 MMHG | HEART RATE: 79 BPM | OXYGEN SATURATION: 95 %

## 2024-03-21 LAB
GLUCOSE BLD-MCNC: 70 MG/DL (ref 70–99)
GLUCOSE BLD-MCNC: 85 MG/DL (ref 70–99)
PERFORMED ON: NORMAL
PERFORMED ON: NORMAL

## 2024-03-21 PROCEDURE — 3700000000 HC ANESTHESIA ATTENDED CARE: Performed by: ORTHOPAEDIC SURGERY

## 2024-03-21 PROCEDURE — 6360000002 HC RX W HCPCS: Performed by: ORTHOPAEDIC SURGERY

## 2024-03-21 PROCEDURE — A4217 STERILE WATER/SALINE, 500 ML: HCPCS | Performed by: ORTHOPAEDIC SURGERY

## 2024-03-21 PROCEDURE — 7100000000 HC PACU RECOVERY - FIRST 15 MIN: Performed by: ORTHOPAEDIC SURGERY

## 2024-03-21 PROCEDURE — 7100000001 HC PACU RECOVERY - ADDTL 15 MIN: Performed by: ORTHOPAEDIC SURGERY

## 2024-03-21 PROCEDURE — 2500000003 HC RX 250 WO HCPCS: Performed by: NURSE ANESTHETIST, CERTIFIED REGISTERED

## 2024-03-21 PROCEDURE — 6360000002 HC RX W HCPCS: Performed by: NURSE ANESTHETIST, CERTIFIED REGISTERED

## 2024-03-21 PROCEDURE — 3600000005 HC SURGERY LEVEL 5 BASE: Performed by: ORTHOPAEDIC SURGERY

## 2024-03-21 PROCEDURE — 2580000003 HC RX 258: Performed by: ORTHOPAEDIC SURGERY

## 2024-03-21 PROCEDURE — 7100000010 HC PHASE II RECOVERY - FIRST 15 MIN: Performed by: ORTHOPAEDIC SURGERY

## 2024-03-21 PROCEDURE — 3700000001 HC ADD 15 MINUTES (ANESTHESIA): Performed by: ORTHOPAEDIC SURGERY

## 2024-03-21 PROCEDURE — 3600000015 HC SURGERY LEVEL 5 ADDTL 15MIN: Performed by: ORTHOPAEDIC SURGERY

## 2024-03-21 PROCEDURE — 2709999900 HC NON-CHARGEABLE SUPPLY: Performed by: ORTHOPAEDIC SURGERY

## 2024-03-21 PROCEDURE — 2580000003 HC RX 258: Performed by: ANESTHESIOLOGY

## 2024-03-21 PROCEDURE — 7100000011 HC PHASE II RECOVERY - ADDTL 15 MIN: Performed by: ORTHOPAEDIC SURGERY

## 2024-03-21 RX ORDER — SODIUM CHLORIDE 0.9 % (FLUSH) 0.9 %
5-40 SYRINGE (ML) INJECTION EVERY 12 HOURS SCHEDULED
Status: DISCONTINUED | OUTPATIENT
Start: 2024-03-21 | End: 2024-03-21 | Stop reason: HOSPADM

## 2024-03-21 RX ORDER — FENTANYL CITRATE 0.05 MG/ML
50 INJECTION, SOLUTION INTRAMUSCULAR; INTRAVENOUS EVERY 5 MIN PRN
Status: DISCONTINUED | OUTPATIENT
Start: 2024-03-21 | End: 2024-03-21 | Stop reason: HOSPADM

## 2024-03-21 RX ORDER — NALOXONE HYDROCHLORIDE 0.4 MG/ML
INJECTION, SOLUTION INTRAMUSCULAR; INTRAVENOUS; SUBCUTANEOUS PRN
Status: DISCONTINUED | OUTPATIENT
Start: 2024-03-21 | End: 2024-03-21 | Stop reason: HOSPADM

## 2024-03-21 RX ORDER — MEPERIDINE HYDROCHLORIDE 25 MG/ML
12.5 INJECTION INTRAMUSCULAR; INTRAVENOUS; SUBCUTANEOUS
Status: DISCONTINUED | OUTPATIENT
Start: 2024-03-21 | End: 2024-03-21 | Stop reason: HOSPADM

## 2024-03-21 RX ORDER — FENTANYL CITRATE 50 UG/ML
INJECTION, SOLUTION INTRAMUSCULAR; INTRAVENOUS PRN
Status: DISCONTINUED | OUTPATIENT
Start: 2024-03-21 | End: 2024-03-21 | Stop reason: SDUPTHER

## 2024-03-21 RX ORDER — LIDOCAINE HYDROCHLORIDE 20 MG/ML
INJECTION, SOLUTION EPIDURAL; INFILTRATION; INTRACAUDAL; PERINEURAL PRN
Status: DISCONTINUED | OUTPATIENT
Start: 2024-03-21 | End: 2024-03-21 | Stop reason: SDUPTHER

## 2024-03-21 RX ORDER — SODIUM CHLORIDE 9 MG/ML
INJECTION, SOLUTION INTRAVENOUS PRN
Status: DISCONTINUED | OUTPATIENT
Start: 2024-03-21 | End: 2024-03-21 | Stop reason: HOSPADM

## 2024-03-21 RX ORDER — MAGNESIUM HYDROXIDE 1200 MG/15ML
LIQUID ORAL CONTINUOUS PRN
Status: COMPLETED | OUTPATIENT
Start: 2024-03-21 | End: 2024-03-21

## 2024-03-21 RX ORDER — LORAZEPAM 2 MG/ML
0.5 INJECTION INTRAMUSCULAR
Status: DISCONTINUED | OUTPATIENT
Start: 2024-03-21 | End: 2024-03-21 | Stop reason: HOSPADM

## 2024-03-21 RX ORDER — ONDANSETRON 2 MG/ML
4 INJECTION INTRAMUSCULAR; INTRAVENOUS
Status: DISCONTINUED | OUTPATIENT
Start: 2024-03-21 | End: 2024-03-21 | Stop reason: HOSPADM

## 2024-03-21 RX ORDER — DEXAMETHASONE SODIUM PHOSPHATE 4 MG/ML
INJECTION, SOLUTION INTRA-ARTICULAR; INTRALESIONAL; INTRAMUSCULAR; INTRAVENOUS; SOFT TISSUE PRN
Status: DISCONTINUED | OUTPATIENT
Start: 2024-03-21 | End: 2024-03-21 | Stop reason: SDUPTHER

## 2024-03-21 RX ORDER — DIPHENHYDRAMINE HYDROCHLORIDE 50 MG/ML
12.5 INJECTION INTRAMUSCULAR; INTRAVENOUS
Status: DISCONTINUED | OUTPATIENT
Start: 2024-03-21 | End: 2024-03-21 | Stop reason: HOSPADM

## 2024-03-21 RX ORDER — PROPOFOL 10 MG/ML
INJECTION, EMULSION INTRAVENOUS PRN
Status: DISCONTINUED | OUTPATIENT
Start: 2024-03-21 | End: 2024-03-21 | Stop reason: SDUPTHER

## 2024-03-21 RX ORDER — SODIUM CHLORIDE 0.9 % (FLUSH) 0.9 %
5-40 SYRINGE (ML) INJECTION PRN
Status: DISCONTINUED | OUTPATIENT
Start: 2024-03-21 | End: 2024-03-21 | Stop reason: HOSPADM

## 2024-03-21 RX ORDER — ONDANSETRON 2 MG/ML
INJECTION INTRAMUSCULAR; INTRAVENOUS PRN
Status: DISCONTINUED | OUTPATIENT
Start: 2024-03-21 | End: 2024-03-21 | Stop reason: SDUPTHER

## 2024-03-21 RX ORDER — HALOPERIDOL 5 MG/ML
1 INJECTION INTRAMUSCULAR
Status: DISCONTINUED | OUTPATIENT
Start: 2024-03-21 | End: 2024-03-21 | Stop reason: HOSPADM

## 2024-03-21 RX ORDER — BUPIVACAINE HYDROCHLORIDE 5 MG/ML
INJECTION, SOLUTION EPIDURAL; INTRACAUDAL
Status: COMPLETED | OUTPATIENT
Start: 2024-03-21 | End: 2024-03-21

## 2024-03-21 RX ADMIN — PROPOFOL 120 MG: 10 INJECTION, EMULSION INTRAVENOUS at 11:00

## 2024-03-21 RX ADMIN — DEXAMETHASONE SODIUM PHOSPHATE 4 MG: 4 INJECTION, SOLUTION INTRAMUSCULAR; INTRAVENOUS at 11:00

## 2024-03-21 RX ADMIN — SODIUM CHLORIDE: 9 INJECTION, SOLUTION INTRAVENOUS at 10:09

## 2024-03-21 RX ADMIN — LIDOCAINE HYDROCHLORIDE 60 MG: 20 INJECTION, SOLUTION EPIDURAL; INFILTRATION; INTRACAUDAL; PERINEURAL at 11:00

## 2024-03-21 RX ADMIN — ONDANSETRON 4 MG: 2 INJECTION INTRAMUSCULAR; INTRAVENOUS at 11:00

## 2024-03-21 RX ADMIN — FENTANYL CITRATE 100 MCG: 50 INJECTION INTRAMUSCULAR; INTRAVENOUS at 11:00

## 2024-03-21 ASSESSMENT — PAIN SCALES - GENERAL: PAINLEVEL_OUTOF10: 0

## 2024-03-21 NOTE — DISCHARGE INSTRUCTIONS
Post-Operative Instructions    Ulnar Nerve Release:    Keep hand elevated with fingers above eye-level to control swelling.  Keep hand and bandage clean and dry.  Do not change or unwrap bandage.  Please leave bandage in place until your follow-up appointment.  Maintain finger motion by fully straightening and fully bending fingers and thumb at least once an hour (while awake).  This may cause some discomfort, but will not damage surgery.  You may use your operated hand for lightweight tasks (e.g. writing, eating, dressing, etc.).  NO LIFTING, CARRYING OR HEAVY USE.    Most Patients do not have \"Serious Pain\" after this procedure and thus most patients do not require prescription pain medication.  You may take over the counter medication (Tylenol, Advil, Aleve, etc.) as needed.  If you are unable to tolerate the discomfort after your surgery and the OTC medications do not provide some relief, you may contact our office to discuss other options..    Please call the office at [phone]  in 24 - 48 hours to schedule a follow up appointment for approximately 7 - 10 days after surgery.  Please call the office at [phone]  if you have any questions or problems.           Yogi Murphy MD

## 2024-03-21 NOTE — OP NOTE
OPERATIVE REPORT          Patient:  Lawrence Rodriguez    YOB: 1950  Date of Service:  3/21/2024   Location:  [facility]      Preoperative Diagnoses:  Left  carpal tunnel syndrome & Left cubital tunnel syndrome     Postoperative Diagnoses:  Same    Procedures:   Left  Carpal Tunnel Release & Left Ulnar Nerve decompression at the Cubital Tunnel     Surgeon:    Yogi Murphy MD    Surgical Assistant:    Hospital Supplied Assistant    Anesthesia:   General                                   Blood Loss:    Minimal         Complications:    None                          Tourniquet Time:   6 minutes      Indications: Mr. Lawrence Rodriguez is a 74 y.o.  year old male with Left  carpal tunnel syndrome & Left cubital tunnel syndrome .   I have discussed preoperatively with him  the complications, limitations, expectations, alternatives and risk of the planned surgical care which he understood & all of his  questions were answered.  Mr. Lawrence Rodriguez has provided written informed consent to proceed.    After written consent was obtained and the proper operative sites were identified and marked, Mr. Lawrence Rodriguez was brought to the operating room and placed in the supine position on the operating room table with the Left arm extended upon a hand table. General anesthesia was induced & the Left upper extremity was prepped and draped in the usual sterile fashion.    Procedure:   After Esmarch exsanguination, the pneuomo-tourniquet was inflated to 250 millimeters of Mercury about the arm.       Attention was turned to the medial elbow.  A curvilinear incision was fashioned over the posterior medial aspect of the elbow centered between the medial epicondyle and the tip of the olecranon. Dissection was carried carefully through the subcutaneous tissue identifying and protecting the superficial neurovascular structures.  The cubital tunnel was identified and cleared of overlying soft tissue.

## 2024-03-21 NOTE — PROGRESS NOTES
Pt to pacu from OR. Pt asleep with oral airway in place, on 4L via NC per CRNA, placed on monitor, vss. Dressing to left arm clean dry and intact, elevated on pillow. Pt opening eyes and biting on oral airway, removed. Oriented to pacu, denies pain or nausea at this time, glucose 70, dr carlisle states okay to given juice as long as patient a/o x4. No signs of distress noted at this time, updated on plan of care. Report obtained.

## 2024-03-21 NOTE — ANESTHESIA PRE PROCEDURE
Department of Anesthesiology  Preprocedure Note       Name:  Lawrence Rodriguez   Age:  74 y.o.  :  1950                                          MRN:  [id number]         Date:  3/21/2024      Surgeon: Surgeon(s):  Yogi Murphy MD    Procedure: Procedure(s):  LEFT ULNAR NERVE DECOMPRESSION (AT ELBOW)  LEFT CARPAL TUNNEL RELEASE    Medications prior to admission:   Prior to Admission medications    Medication Sig Start Date End Date Taking? Authorizing Provider   dilTIAZem (CARDIZEM 12 HR) 120 MG extended release capsule Take 1 capsule by mouth daily    Alis Rodriguez MD   OZEMPIC, 0.25 OR 0.5 MG/DOSE, 2 MG/3ML SOPN Stopped for surgery last dose on 3//11 12/29/23   Alis Rodriguez MD   metoprolol tartrate (LOPRESSOR) 25 MG tablet TAKE 1 TABLET BY MOUTH TWICE DAILY 10/17/23   Jessica Joyce APRN - CNP   furosemide (LASIX) 20 MG tablet TAKE 1 TABLET BY MOUTH DAILY OR AS DIRECTED 7/3/23   Jessica Joyce APRN - CNP   empagliflozin (JARDIANCE) 10 MG tablet TAKE 1 TABLET BY MOUTH DAILY 23   GiViolet segal APRN - CNS   Cholecalciferol (VITAMIN D) 50 MCG (2000) CAPS capsule Take by mouth    Alis Rodriguez MD   spironolactone (ALDACTONE) 25 MG tablet TAKE 1 TABLET BY MOUTH DAILY 22   Jessica Joyce APRN - CNP   glimepiride (AMARYL) 1 MG tablet Take 1 tablet by mouth 2 times daily    Alis Rodriguez MD   pantoprazole (PROTONIX) 40 MG tablet TAKE 1 TABLET(40 MG) BY MOUTH DAILY 21   Reyes, Eleia J, MD   warfarin (COUMADIN) 5 MG tablet One tab daily, as directed  On  and  take only 1/2 tabs.  Get INR on Monday 7/15  Patient taking differently: Take 3 mg by mouth daily Managed by PCP 19   Rosemary Collado APRN - CNP   zolpidem (AMBIEN) 10 MG tablet nightly as needed . 14   Alis Rodriguez MD   atorvastatin (LIPITOR) 10 MG tablet Take 1 tablet by mouth daily. 14   Winter, Leti L, APRN - CNP   aspirin EC 81 MG EC tablet Take

## 2024-03-21 NOTE — PROGRESS NOTES
Pt tolerating po snack, still denies pain or nausea at this time, sitting up in bed, chatting with writer, states feels ready to move to phase 2 to see his wife. Vss.no signs of distress noted at this time.

## 2024-03-21 NOTE — ANESTHESIA POSTPROCEDURE EVALUATION
Department of Anesthesiology  Postprocedure Note    Patient: Lawrence Rodriguez  MRN: [id number]  YOB: 1950  Date of evaluation: 3/21/2024    Procedure Summary       Date: 03/21/24 Room / Location: [address]    Anesthesia Start: 1056 Anesthesia Stop: 1117    Procedures:       LEFT ULNAR NERVE DECOMPRESSION (AT ELBOW) (Left: Elbow)      LEFT CARPAL TUNNEL RELEASE (Left: Wrist) Diagnosis:       Cubital tunnel syndrome on left      Left carpal tunnel syndrome      (Cubital tunnel syndrome on left [G56.22])      (Left carpal tunnel syndrome [G56.02])    Surgeons: Yogi Murphy MD Responsible Provider: Priyanka Cheney MD    Anesthesia Type: general ASA Status: 3            Anesthesia Type: No value filed.    Mei Phase I: Mei Score: 10    Mei Phase II: Mei Score: 10    Anesthesia Post Evaluation    Patient location during evaluation: PACU  Patient participation: complete - patient participated  Level of consciousness: awake and alert  Airway patency: patent  Nausea & Vomiting: no nausea and no vomiting  Cardiovascular status: hemodynamically stable  Respiratory status: acceptable  Hydration status: stable  Pain management: adequate    No notable events documented.

## 2024-03-21 NOTE — H&P
Pre-operative Update of H&P:    I  have seen & examined Mr. Lawrence FRANZ Jennifer related solely to his hand and upper extremity conditions, prior to the scheduled procedure on the date of his surgery.  The indications for the planned surgical procedure & and his upper-extremity condition are unchanged.

## 2024-03-27 ENCOUNTER — OFFICE VISIT (OUTPATIENT)
Dept: ORTHOPEDIC SURGERY | Age: 74
End: 2024-03-27

## 2024-03-27 VITALS — RESPIRATION RATE: 16 BRPM | BODY MASS INDEX: 37.8 KG/M2 | HEIGHT: 71 IN | WEIGHT: 270 LBS

## 2024-03-27 DIAGNOSIS — Z98.890 STATUS POST CARPAL TUNNEL RELEASE: Primary | ICD-10-CM

## 2024-03-27 PROCEDURE — 99024 POSTOP FOLLOW-UP VISIT: CPT | Performed by: ORTHOPAEDIC SURGERY

## 2024-03-27 NOTE — PROGRESS NOTES
Mr. Lawrence Rodriguez returns today in follow-up of his recent left Ulnar Nerve Decompression (Cubital Tunnel Release) & Carpal Tunnel Release done approximately 6 days ago.  He has done well noting mild discomfort and no other reported complications.    He notes pre-operative symptoms to be significantly improved at this time.    Physical Exam:  Bandage intact   Skin incision is healing well, without erythema, drainage or sign of infection.  Digital range of motion is without significant limitation.  Wrist range of motion is without significant limitation.  Elbow range of motion is without significant limitation.  Sensation is significantly improved in the Median Innervated Digits and Ulnar Innervated Digits.  Vascular examination reveals normal, good capillary refill, and good color.  Swelling is minimal.  Sensory and Motor Median & Ulnar Nerve function is intact.    Impression:  Mr. Lawrence Rodriguez is doing well after recent left Ulnar Nerve Decompression (Cubital Tunnel Release) &  Carpal Tunnel Release.    Plan:  Mr. Lawrence Rodriguez is instructed in work on Active & Passive range of motion of the digits, wrist, & elbow.  These modalities were specifically demonstrated to him today.  We discussed the appropriateness of gradual resumption of use of the operated hand and the return to normal use as comfort allows.  He is given instructions regarding management of the fresh surgical incision and progressive use of desensitization and tissue massage techniques.  We discussed the appropriate expectations and timeline for symptom improvement.    He is provided a written patient instruction sheet titled: Postoperative Instructions After Ulnar Nerve Decompression.    I have asked Mr. Lawrence Rodriguez to follow-up with me or contact me by telephone over the next 2-4 weeks if his symptoms have not fully resolved or if he has not regained full & painless return of function.      He is also specifically instructed to return to

## 2024-03-27 NOTE — PATIENT INSTRUCTIONS
Postoperative Instructions After Carpal Tunnel Release    Dr. Yogi Murphy        After bandages are removed one week from surgery, you may chose to wear a small bandage over the incision if you wish, though you do not need to.  Keep incision dry until sutures have fully dissolved  or it has been 12 - 14 days since your surgery. Thereafter, you may wash with mild soap and water and shower normally.    Work hard on motion of the fingers and wrist, straightening each finger fully and bending each finger fully, bending wrist forward and bending wrist backwards. Do not be concerned if you experience discomfort.  This will not damage the surgery.  You may begin using the hand as it feels comfortable beginning 12 - 14 days from the day of surgery. You may not feel entirely comfortable gripping or lifting heavy objects for several weeks.  You may expect to see some skin “peel” off around the incision.  You may be left with a small area of “pink baby skin”. This is quite normal.  6. Once your stiches have fully disappeared & skin appears normal, you should begin gently massaging the incision with Vitamin E (may use Vitamin E lotion or contents of Vitamin E capsule).      Thank you for choosing [facility] for your Hand and Upper Extremity needs.  If we can be of any further assistance to you, please do not hesitate to contact us.    Office Phone Number:  [phone]  or  [phone]

## 2024-03-29 ENCOUNTER — OFFICE VISIT (OUTPATIENT)
Dept: ORTHOPEDIC SURGERY | Age: 74
End: 2024-03-29

## 2024-03-29 VITALS — HEIGHT: 71 IN | RESPIRATION RATE: 14 BRPM | WEIGHT: 270 LBS | BODY MASS INDEX: 37.8 KG/M2

## 2024-03-29 DIAGNOSIS — M17.0 ARTHRITIS OF BOTH KNEES: ICD-10-CM

## 2024-03-29 DIAGNOSIS — M25.561 CHRONIC PAIN OF BOTH KNEES: Primary | ICD-10-CM

## 2024-03-29 DIAGNOSIS — M25.562 CHRONIC PAIN OF BOTH KNEES: Primary | ICD-10-CM

## 2024-03-29 DIAGNOSIS — G89.29 CHRONIC PAIN OF BOTH KNEES: Primary | ICD-10-CM

## 2024-03-29 RX ORDER — BUPIVACAINE HYDROCHLORIDE 2.5 MG/ML
1 INJECTION, SOLUTION INFILTRATION; PERINEURAL ONCE
Status: COMPLETED | OUTPATIENT
Start: 2024-03-29 | End: 2024-03-29

## 2024-03-29 RX ORDER — TRIAMCINOLONE ACETONIDE 40 MG/ML
40 INJECTION, SUSPENSION INTRA-ARTICULAR; INTRAMUSCULAR ONCE
Status: COMPLETED | OUTPATIENT
Start: 2024-03-29 | End: 2024-03-29

## 2024-03-29 RX ORDER — LIDOCAINE HYDROCHLORIDE 10 MG/ML
1 INJECTION, SOLUTION INFILTRATION; PERINEURAL ONCE
Status: COMPLETED | OUTPATIENT
Start: 2024-03-29 | End: 2024-03-29

## 2024-03-29 RX ADMIN — LIDOCAINE HYDROCHLORIDE 1 ML: 10 INJECTION, SOLUTION INFILTRATION; PERINEURAL at 10:10

## 2024-03-29 RX ADMIN — TRIAMCINOLONE ACETONIDE 40 MG: 40 INJECTION, SUSPENSION INTRA-ARTICULAR; INTRAMUSCULAR at 10:11

## 2024-03-29 RX ADMIN — BUPIVACAINE HYDROCHLORIDE 2.5 MG: 2.5 INJECTION, SOLUTION INFILTRATION; PERINEURAL at 10:09

## 2024-03-29 NOTE — PROGRESS NOTES
ORTHOPAEDIC OFFICE NOTE    Chief Complaint   Patient presents with    Follow-up     POOJA knee        Knee Pain     3/29/24  Jesus Alberto returns to clinic today for follow-up of bilateral knee pain  I last saw him almost 2 years ago and he had great relief with the steroid injections  He has been more active and trying to lose weight, he is walking a lot  He reports recent return of the knee pain, left worse than right  Mainly anterior lateral  He reports he has lost 45 pounds  Denies numbness and tingling      8/12/22  Jessu Alberto presents to clinic today due to left knee injury  He was in [place] when he injured it getting into the pool  He reports he jammed it  This occurred 2 days ago  He describes pain anterolaterally, rated 6-7/10  Worse with stairs/incline  Has been icing with some relief  He returned to [place] early because of this  His right knee is doing fine currently  Prior to this, both knees were doing well after the viscosupplementation in February 2/9/22  Here for knee visco      1/19/22  71 y.o. male seen for evaluation of bilateral knee pain:  Previous patient of Dr Garcia's  Onset chronic/years  Injury/trauma none  History of symptoms as above  Pain is located diffuse bilateral knees  Right worse than left  Worse with activity  Better with rest, prior knee injections  No prior PT  Pain rated 1/10 today      No Known Allergies     Current Outpatient Medications   Medication Sig Dispense Refill    dilTIAZem (CARDIZEM 12 HR) 120 MG extended release capsule Take 1 capsule by mouth daily      OZEMPIC, 0.25 OR 0.5 MG/DOSE, 2 MG/3ML SOPN Stopped for surgery last dose on 3//11      metoprolol tartrate (LOPRESSOR) 25 MG tablet TAKE 1 TABLET BY MOUTH TWICE DAILY 180 tablet 3    furosemide (LASIX) 20 MG tablet TAKE 1 TABLET BY MOUTH DAILY OR AS DIRECTED 90 tablet 0    empagliflozin (JARDIANCE) 10 MG tablet TAKE 1 TABLET BY MOUTH DAILY 90 tablet 0    Cholecalciferol (VITAMIN D) 50 MCG (2000 UT) CAPS capsule Take

## 2024-04-22 ENCOUNTER — APPOINTMENT (OUTPATIENT)
Dept: CT IMAGING | Age: 74
End: 2024-04-22
Payer: COMMERCIAL

## 2024-04-22 ENCOUNTER — HOSPITAL ENCOUNTER (INPATIENT)
Age: 74
LOS: 4 days | Discharge: HOME | End: 2024-04-26
Attending: EMERGENCY MEDICINE | Admitting: INTERNAL MEDICINE
Payer: COMMERCIAL

## 2024-04-22 ENCOUNTER — APPOINTMENT (OUTPATIENT)
Dept: INTERVENTIONAL RADIOLOGY/VASCULAR | Age: 74
End: 2024-04-22
Payer: COMMERCIAL

## 2024-04-22 DIAGNOSIS — Z79.01 ANTICOAGULANT LONG-TERM USE: ICD-10-CM
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
## 2024-04-22 DIAGNOSIS — S37.012A HEMATOMA OF LEFT KIDNEY, INITIAL ENCOUNTER: ICD-10-CM

## 2024-04-22 DIAGNOSIS — R07.81 RIB PAIN ON LEFT SIDE: ICD-10-CM

## 2024-04-22 DIAGNOSIS — Z71.89 GOALS OF CARE, COUNSELING/DISCUSSION: ICD-10-CM

## 2024-04-22 DIAGNOSIS — R58 RETROPERITONEAL HEMORRHAGE: ICD-10-CM

## 2024-04-22 PROBLEM — T14.8XXA HEMATOMA: Status: ACTIVE | Noted: 2024-04-22

## 2024-04-22 LAB
ANION GAP SERPL CALCULATED.3IONS-SCNC: 14 MMOL/L (ref 3–16)
BACTERIA URNS QL MICRO: NORMAL /HPF
BASOPHILS # BLD: 0.1 K/UL (ref 0–0.2)
BASOPHILS NFR BLD: 0.3 %
BILIRUB UR QL STRIP.AUTO: NEGATIVE
BUN SERPL-MCNC: 24 MG/DL (ref 7–20)
CALCIUM SERPL-MCNC: 8.8 MG/DL (ref 8.3–10.6)
CHLORIDE SERPL-SCNC: 105 MMOL/L (ref 99–110)
CLARITY UR: CLEAR
CO2 SERPL-SCNC: 16 MMOL/L (ref 21–32)
COLOR UR: YELLOW
CREAT SERPL-MCNC: 1.6 MG/DL (ref 0.8–1.3)
DEPRECATED RDW RBC AUTO: 15.1 % (ref 12.4–15.4)
EOSINOPHIL # BLD: 0.2 K/UL (ref 0–0.6)
EOSINOPHIL NFR BLD: 1.5 %
EPI CELLS #/AREA URNS AUTO: 0 /HPF (ref 0–5)
GFR SERPLBLD CREATININE-BSD FMLA CKD-EPI: 45 ML/MIN/{1.73_M2}
GLUCOSE BLD-MCNC: 186 MG/DL (ref 70–99)
GLUCOSE BLD-MCNC: 189 MG/DL (ref 70–99)
GLUCOSE SERPL-MCNC: 179 MG/DL (ref 70–99)
GLUCOSE UR STRIP.AUTO-MCNC: >=1000 MG/DL
HCT VFR BLD AUTO: 41.6 % (ref 40.5–52.5)
HCT VFR BLD AUTO: 42.5 % (ref 40.5–52.5)
HGB BLD-MCNC: 13.1 G/DL (ref 13.5–17.5)
HGB BLD-MCNC: 13.9 G/DL (ref 13.5–17.5)
HGB UR QL STRIP.AUTO: NEGATIVE
HYALINE CASTS #/AREA URNS AUTO: 2 /LPF (ref 0–8)
INR PPP: 1.91 (ref 0.85–1.15)
KETONES UR STRIP.AUTO-MCNC: 15 MG/DL
LEUKOCYTE ESTERASE UR QL STRIP.AUTO: NEGATIVE
LYMPHOCYTES # BLD: 1.4 K/UL (ref 1–5.1)
LYMPHOCYTES NFR BLD: 8.2 %
MCH RBC QN AUTO: 30.1 PG (ref 26–34)
MCHC RBC AUTO-ENTMCNC: 32.7 G/DL (ref 31–36)
MCV RBC AUTO: 92.1 FL (ref 80–100)
MONOCYTES # BLD: 1.3 K/UL (ref 0–1.3)
MONOCYTES NFR BLD: 8 %
NEUTROPHILS # BLD: 13.6 K/UL (ref 1.7–7.7)
NEUTROPHILS NFR BLD: 82 %
NITRITE UR QL STRIP.AUTO: NEGATIVE
PERFORMED ON: ABNORMAL
PERFORMED ON: ABNORMAL
PH UR STRIP.AUTO: 5 [PH] (ref 5–8)
PLATELET # BLD AUTO: 232 K/UL (ref 135–450)
PMV BLD AUTO: 7.9 FL (ref 5–10.5)
POTASSIUM SERPL-SCNC: 5.1 MMOL/L (ref 3.5–5.1)
PROT UR STRIP.AUTO-MCNC: 30 MG/DL
PROTHROMBIN TIME: 21.9 SEC (ref 11.9–14.9)
RBC # BLD AUTO: 4.62 M/UL (ref 4.2–5.9)
RBC CLUMPS #/AREA URNS AUTO: 1 /HPF (ref 0–4)
SODIUM SERPL-SCNC: 135 MMOL/L (ref 136–145)
SP GR UR STRIP.AUTO: >=1.03 (ref 1–1.03)
UA COMPLETE W REFLEX CULTURE PNL UR: ABNORMAL
UA DIPSTICK W REFLEX MICRO PNL UR: YES
URN SPEC COLLECT METH UR: ABNORMAL
UROBILINOGEN UR STRIP-ACNC: 1 E.U./DL
WBC # BLD AUTO: 16.6 K/UL (ref 4–11)
WBC #/AREA URNS AUTO: 1 /HPF (ref 0–5)

## 2024-04-22 PROCEDURE — 6360000004 HC RX CONTRAST MEDICATION: Performed by: INTERNAL MEDICINE

## 2024-04-22 PROCEDURE — 2500000003 HC RX 250 WO HCPCS: Performed by: INTERNAL MEDICINE

## 2024-04-22 PROCEDURE — 85025 COMPLETE CBC W/AUTO DIFF WBC: CPT

## 2024-04-22 PROCEDURE — 71250 CT THORAX DX C-: CPT

## 2024-04-22 PROCEDURE — 6360000004 HC RX CONTRAST MEDICATION: Performed by: EMERGENCY MEDICINE

## 2024-04-22 PROCEDURE — 81001 URINALYSIS AUTO W/SCOPE: CPT

## 2024-04-22 PROCEDURE — 6370000000 HC RX 637 (ALT 250 FOR IP): Performed by: EMERGENCY MEDICINE

## 2024-04-22 PROCEDURE — 04LA3DZ OCCLUSION OF LEFT RENAL ARTERY WITH INTRALUMINAL DEVICE, PERCUTANEOUS APPROACH: ICD-10-PCS | Performed by: STUDENT IN AN ORGANIZED HEALTH CARE EDUCATION/TRAINING PROGRAM

## 2024-04-22 PROCEDURE — C1894 INTRO/SHEATH, NON-LASER: HCPCS

## 2024-04-22 PROCEDURE — 85014 HEMATOCRIT: CPT

## 2024-04-22 PROCEDURE — 6360000002 HC RX W HCPCS: Performed by: INTERNAL MEDICINE

## 2024-04-22 PROCEDURE — 6360000002 HC RX W HCPCS: Performed by: STUDENT IN AN ORGANIZED HEALTH CARE EDUCATION/TRAINING PROGRAM

## 2024-04-22 PROCEDURE — 70450 CT HEAD/BRAIN W/O DYE: CPT

## 2024-04-22 PROCEDURE — 74174 CTA ABD&PLVS W/CONTRAST: CPT

## 2024-04-22 PROCEDURE — 94150 VITAL CAPACITY TEST: CPT

## 2024-04-22 PROCEDURE — 96372 THER/PROPH/DIAG INJ SC/IM: CPT

## 2024-04-22 PROCEDURE — B4171ZZ FLUOROSCOPY OF LEFT RENAL ARTERY USING LOW OSMOLAR CONTRAST: ICD-10-PCS | Performed by: STUDENT IN AN ORGANIZED HEALTH CARE EDUCATION/TRAINING PROGRAM

## 2024-04-22 PROCEDURE — 85610 PROTHROMBIN TIME: CPT

## 2024-04-22 PROCEDURE — 2580000003 HC RX 258: Performed by: INTERNAL MEDICINE

## 2024-04-22 PROCEDURE — 85018 HEMOGLOBIN: CPT

## 2024-04-22 PROCEDURE — 36253 INS CATH REN ART 2ND+ UNILAT: CPT

## 2024-04-22 PROCEDURE — 99153 MOD SED SAME PHYS/QHP EA: CPT

## 2024-04-22 PROCEDURE — 80048 BASIC METABOLIC PNL TOTAL CA: CPT

## 2024-04-22 PROCEDURE — 99152 MOD SED SAME PHYS/QHP 5/>YRS: CPT

## 2024-04-22 PROCEDURE — 2140000000 HC CCU INTERMEDIATE R&B

## 2024-04-22 PROCEDURE — 6360000002 HC RX W HCPCS: Performed by: EMERGENCY MEDICINE

## 2024-04-22 PROCEDURE — 6370000000 HC RX 637 (ALT 250 FOR IP): Performed by: INTERNAL MEDICINE

## 2024-04-22 PROCEDURE — 72125 CT NECK SPINE W/O DYE: CPT

## 2024-04-22 PROCEDURE — 96374 THER/PROPH/DIAG INJ IV PUSH: CPT

## 2024-04-22 PROCEDURE — 99285 EMERGENCY DEPT VISIT HI MDM: CPT

## 2024-04-22 PROCEDURE — 37244 VASC EMBOLIZE/OCCLUDE BLEED: CPT

## 2024-04-22 RX ORDER — LIDOCAINE HYDROCHLORIDE 10 MG/ML
10 INJECTION, SOLUTION EPIDURAL; INFILTRATION; INTRACAUDAL; PERINEURAL ONCE
Status: COMPLETED | OUTPATIENT
Start: 2024-04-22 | End: 2024-04-22

## 2024-04-22 RX ORDER — LIDOCAINE 4 G/G
1 PATCH TOPICAL ONCE
Status: COMPLETED | OUTPATIENT
Start: 2024-04-22 | End: 2024-04-22

## 2024-04-22 RX ORDER — ACETAMINOPHEN 325 MG/1
650 TABLET ORAL EVERY 6 HOURS PRN
Status: DISCONTINUED | OUTPATIENT
Start: 2024-04-22 | End: 2024-04-26 | Stop reason: HOSPADM

## 2024-04-22 RX ORDER — MIDAZOLAM HYDROCHLORIDE 2 MG/2ML
INJECTION, SOLUTION INTRAMUSCULAR; INTRAVENOUS PRN
Status: COMPLETED | OUTPATIENT
Start: 2024-04-22 | End: 2024-04-22

## 2024-04-22 RX ORDER — DILTIAZEM HYDROCHLORIDE 120 MG/1
120 CAPSULE, EXTENDED RELEASE ORAL DAILY
Status: DISCONTINUED | OUTPATIENT
Start: 2024-04-22 | End: 2024-04-22

## 2024-04-22 RX ORDER — SODIUM CHLORIDE 9 MG/ML
INJECTION, SOLUTION INTRAVENOUS PRN
Status: DISCONTINUED | OUTPATIENT
Start: 2024-04-22 | End: 2024-04-26 | Stop reason: HOSPADM

## 2024-04-22 RX ORDER — PANTOPRAZOLE SODIUM 40 MG/1
40 TABLET, DELAYED RELEASE ORAL
Status: DISCONTINUED | OUTPATIENT
Start: 2024-04-23 | End: 2024-04-26 | Stop reason: HOSPADM

## 2024-04-22 RX ORDER — SODIUM CHLORIDE 0.9 % (FLUSH) 0.9 %
5-40 SYRINGE (ML) INJECTION EVERY 12 HOURS SCHEDULED
Status: DISCONTINUED | OUTPATIENT
Start: 2024-04-22 | End: 2024-04-26 | Stop reason: HOSPADM

## 2024-04-22 RX ORDER — FENTANYL CITRATE 50 UG/ML
INJECTION, SOLUTION INTRAMUSCULAR; INTRAVENOUS PRN
Status: COMPLETED | OUTPATIENT
Start: 2024-04-22 | End: 2024-04-22

## 2024-04-22 RX ORDER — MORPHINE SULFATE 2 MG/ML
2 INJECTION, SOLUTION INTRAMUSCULAR; INTRAVENOUS EVERY 4 HOURS PRN
Status: DISCONTINUED | OUTPATIENT
Start: 2024-04-22 | End: 2024-04-26 | Stop reason: HOSPADM

## 2024-04-22 RX ORDER — ONDANSETRON 2 MG/ML
8 INJECTION INTRAMUSCULAR; INTRAVENOUS ONCE
Status: COMPLETED | OUTPATIENT
Start: 2024-04-22 | End: 2024-04-22

## 2024-04-22 RX ORDER — DILTIAZEM HYDROCHLORIDE 120 MG/1
120 CAPSULE, COATED, EXTENDED RELEASE ORAL DAILY
Status: DISCONTINUED | OUTPATIENT
Start: 2024-04-22 | End: 2024-04-26

## 2024-04-22 RX ORDER — INSULIN LISPRO 100 [IU]/ML
0-4 INJECTION, SOLUTION INTRAVENOUS; SUBCUTANEOUS NIGHTLY
Status: DISCONTINUED | OUTPATIENT
Start: 2024-04-22 | End: 2024-04-26 | Stop reason: HOSPADM

## 2024-04-22 RX ORDER — ACETAMINOPHEN 650 MG/1
650 SUPPOSITORY RECTAL EVERY 6 HOURS PRN
Status: DISCONTINUED | OUTPATIENT
Start: 2024-04-22 | End: 2024-04-26 | Stop reason: HOSPADM

## 2024-04-22 RX ORDER — HYDROMORPHONE HYDROCHLORIDE 1 MG/ML
0.5 INJECTION, SOLUTION INTRAMUSCULAR; INTRAVENOUS; SUBCUTANEOUS ONCE
Status: COMPLETED | OUTPATIENT
Start: 2024-04-22 | End: 2024-04-22

## 2024-04-22 RX ORDER — INSULIN LISPRO 100 [IU]/ML
0-8 INJECTION, SOLUTION INTRAVENOUS; SUBCUTANEOUS
Status: DISCONTINUED | OUTPATIENT
Start: 2024-04-22 | End: 2024-04-26 | Stop reason: HOSPADM

## 2024-04-22 RX ORDER — POLYETHYLENE GLYCOL 3350 17 G/17G
17 POWDER, FOR SOLUTION ORAL DAILY PRN
Status: DISCONTINUED | OUTPATIENT
Start: 2024-04-22 | End: 2024-04-26 | Stop reason: HOSPADM

## 2024-04-22 RX ORDER — WARFARIN SODIUM 3 MG/1
3 TABLET ORAL DAILY
COMMUNITY

## 2024-04-22 RX ORDER — ONDANSETRON 2 MG/ML
4 INJECTION INTRAMUSCULAR; INTRAVENOUS EVERY 6 HOURS PRN
Status: DISCONTINUED | OUTPATIENT
Start: 2024-04-22 | End: 2024-04-26 | Stop reason: HOSPADM

## 2024-04-22 RX ORDER — SODIUM CHLORIDE, SODIUM LACTATE, POTASSIUM CHLORIDE, CALCIUM CHLORIDE 600; 310; 30; 20 MG/100ML; MG/100ML; MG/100ML; MG/100ML
INJECTION, SOLUTION INTRAVENOUS CONTINUOUS
Status: DISCONTINUED | OUTPATIENT
Start: 2024-04-22 | End: 2024-04-23

## 2024-04-22 RX ORDER — SODIUM CHLORIDE 0.9 % (FLUSH) 0.9 %
5-40 SYRINGE (ML) INJECTION PRN
Status: DISCONTINUED | OUTPATIENT
Start: 2024-04-22 | End: 2024-04-26 | Stop reason: HOSPADM

## 2024-04-22 RX ORDER — ATORVASTATIN CALCIUM 10 MG/1
10 TABLET, FILM COATED ORAL DAILY
Status: DISCONTINUED | OUTPATIENT
Start: 2024-04-22 | End: 2024-04-26 | Stop reason: HOSPADM

## 2024-04-22 RX ADMIN — MIDAZOLAM HYDROCHLORIDE 0.5 MG: 1 INJECTION, SOLUTION INTRAMUSCULAR; INTRAVENOUS at 16:40

## 2024-04-22 RX ADMIN — SODIUM CHLORIDE, POTASSIUM CHLORIDE, SODIUM LACTATE AND CALCIUM CHLORIDE: 600; 310; 30; 20 INJECTION, SOLUTION INTRAVENOUS at 18:54

## 2024-04-22 RX ADMIN — ATORVASTATIN CALCIUM 10 MG: 10 TABLET, FILM COATED ORAL at 18:47

## 2024-04-22 RX ADMIN — HYDROMORPHONE HYDROCHLORIDE 0.5 MG: 1 INJECTION, SOLUTION INTRAMUSCULAR; INTRAVENOUS; SUBCUTANEOUS at 10:58

## 2024-04-22 RX ADMIN — MIDAZOLAM HYDROCHLORIDE 1 MG: 1 INJECTION, SOLUTION INTRAMUSCULAR; INTRAVENOUS at 15:00

## 2024-04-22 RX ADMIN — MIDAZOLAM HYDROCHLORIDE 0.5 MG: 1 INJECTION, SOLUTION INTRAMUSCULAR; INTRAVENOUS at 16:14

## 2024-04-22 RX ADMIN — MORPHINE SULFATE 2 MG: 2 INJECTION, SOLUTION INTRAMUSCULAR; INTRAVENOUS at 13:37

## 2024-04-22 RX ADMIN — MIDAZOLAM HYDROCHLORIDE 1 MG: 1 INJECTION, SOLUTION INTRAMUSCULAR; INTRAVENOUS at 15:02

## 2024-04-22 RX ADMIN — FENTANYL CITRATE 25 MCG: 50 INJECTION, SOLUTION INTRAMUSCULAR; INTRAVENOUS at 16:14

## 2024-04-22 RX ADMIN — ONDANSETRON 8 MG: 2 INJECTION INTRAMUSCULAR; INTRAVENOUS at 11:11

## 2024-04-22 RX ADMIN — ONDANSETRON 4 MG: 2 INJECTION INTRAMUSCULAR; INTRAVENOUS at 18:41

## 2024-04-22 RX ADMIN — IOPAMIDOL 100 ML: 755 INJECTION, SOLUTION INTRAVENOUS at 16:54

## 2024-04-22 RX ADMIN — FENTANYL CITRATE 50 MCG: 50 INJECTION, SOLUTION INTRAMUSCULAR; INTRAVENOUS at 15:02

## 2024-04-22 RX ADMIN — METOPROLOL TARTRATE 25 MG: 25 TABLET, FILM COATED ORAL at 21:06

## 2024-04-22 RX ADMIN — FENTANYL CITRATE 25 MCG: 50 INJECTION, SOLUTION INTRAMUSCULAR; INTRAVENOUS at 16:40

## 2024-04-22 RX ADMIN — IOPAMIDOL 75 ML: 755 INJECTION, SOLUTION INTRAVENOUS at 13:48

## 2024-04-22 RX ADMIN — MIDAZOLAM HYDROCHLORIDE 0.5 MG: 1 INJECTION, SOLUTION INTRAMUSCULAR; INTRAVENOUS at 16:02

## 2024-04-22 RX ADMIN — FENTANYL CITRATE 25 MCG: 50 INJECTION, SOLUTION INTRAMUSCULAR; INTRAVENOUS at 15:36

## 2024-04-22 RX ADMIN — LIDOCAINE HYDROCHLORIDE 10 ML: 10 INJECTION, SOLUTION EPIDURAL; INFILTRATION; INTRACAUDAL; PERINEURAL at 16:54

## 2024-04-22 RX ADMIN — MIDAZOLAM HYDROCHLORIDE 0.5 MG: 1 INJECTION, SOLUTION INTRAMUSCULAR; INTRAVENOUS at 15:36

## 2024-04-22 RX ADMIN — MORPHINE SULFATE 2 MG: 2 INJECTION, SOLUTION INTRAMUSCULAR; INTRAVENOUS at 21:09

## 2024-04-22 RX ADMIN — FENTANYL CITRATE 25 MCG: 50 INJECTION, SOLUTION INTRAMUSCULAR; INTRAVENOUS at 16:02

## 2024-04-22 ASSESSMENT — PAIN SCALES - GENERAL
PAINLEVEL_OUTOF10: 3
PAINLEVEL_OUTOF10: 10
PAINLEVEL_OUTOF10: 8
PAINLEVEL_OUTOF10: 1

## 2024-04-22 ASSESSMENT — PAIN DESCRIPTION - ORIENTATION
ORIENTATION: LEFT
ORIENTATION: LEFT;LOWER
ORIENTATION: LEFT
ORIENTATION: LEFT

## 2024-04-22 ASSESSMENT — LIFESTYLE VARIABLES
HOW MANY STANDARD DRINKS CONTAINING ALCOHOL DO YOU HAVE ON A TYPICAL DAY: 1 OR 2
HOW OFTEN DO YOU HAVE A DRINK CONTAINING ALCOHOL: 2-4 TIMES A MONTH
HOW OFTEN DO YOU HAVE A DRINK CONTAINING ALCOHOL: NEVER
HOW MANY STANDARD DRINKS CONTAINING ALCOHOL DO YOU HAVE ON A TYPICAL DAY: PATIENT DOES NOT DRINK

## 2024-04-22 ASSESSMENT — PAIN DESCRIPTION - LOCATION
LOCATION: ABDOMEN
LOCATION: FLANK

## 2024-04-22 ASSESSMENT — PAIN DESCRIPTION - DESCRIPTORS: DESCRIPTORS: ACHING

## 2024-04-22 NOTE — PROGRESS NOTES
Patient seen in ED, room 24.  Admission completed with the following exceptions:  4 Eyes Assessment, Immunizations, Vaccines, Rights and Responsibilities, Orientation to room, Plan of Care, Education/Learning Assessment and Education Plan, white board, height and weight, pain assessment and head to toe assessment.  Patient is alert and oriented X 4.  Patient lives in a two story house with his wife and is being admitted for Hematoma after a fall.  Home Medication reconciliation was completed by Laure Lopez.  All questions answered.

## 2024-04-22 NOTE — H&P
HOSPITALISTS HISTORY AND PHYSICAL    4/22/2024 1:28 PM    Patient Information:  STACY RODRIGUEZ is a 74 y.o. male [id number]  PCP:  Jose Brown MD (Tel: [phone] )    Chief complaint:    Chief Complaint   Patient presents with    Fall     Fell this AM around 4AM walking a dog. Fell on left flank. Unable to get comfortable. Pain 7 or 8 out of 10. Denies SOB. Took 2 tylenol at home.       History of Present Illness:  Stacy Rodriguez is a 74 y.o. male who was taking his dog out and then had a mechanical fall outside fell onto his left side did not hit his head did not lose consciousness patient is having severe 10 out of 10 left-sided flank pain denied any nausea vomiting lightheadedness dizziness chest pain or palpitation before the fall is on Coumadin for A-fib occasionally drinks does not smoke    REVIEW OF SYSTEMS:   Constitutional: Negative for fever,chills or night sweats  ENT: Negative for rhinorrhea, epistaxis, hoarseness, sore throat.  Respiratory: Negative for shortness of breath,wheezing  Cardiovascular: Negative for chest pain, palpitations   Gastrointestinal: Negative for nausea, vomiting, diarrhea  Genitourinary: Negative for polyuria, dysuria   Musculoskeletal: Negative for myalgias and arthralgias  Neurologic: Negative for confusion,dysarthria.  Skin: Negative for itching,rash  Psychiatric: Negative for depression,anxiety, agitation.  Endocrine: Negative for polydipsia,polyuria,heat /cold intolerance.    Past Medical History:   has a past medical history of Arthritis, Atrial fibrillation (HCC), CAD (coronary artery disease), Cancer (HCC), Diabetes mellitus (HCC), Hypercholesteremia, Hypertension, Obesity, Sleep apnea, Ulcer of pyloric antrum, and Wears glasses.     Past Surgical History:   has a past surgical history that includes Diagnostic Cardiac Cath Lab Procedure (8/24/06); knee surgery;  Disease in his mother. ,     Physical Exam:  /82   Pulse 84   Temp 97.7 °F (36.5 °C) (Oral)   Resp 14   SpO2 97%     General appearance:  Appears comfortable. AAOx3  HEENT: atraumatic, Pupils equal, muscous membranes moist, no masses appreciated  Cardiovascular: Regular rate and rhythm no murmurs appreciated  Respiratory: CTAB no wheezing  Gastrointestinal: Abdomen soft, non-tender, BS+ left-sided flank pain to palpation  EXT: no edema  Neurology: no gross focal deficts  Psychiatry: Appropriate affect. Not agitated  Skin: Warm, dry, no rashes appreciated    Labs:  CBC:   Lab Results   Component Value Date/Time    WBC 16.6 04/22/2024 11:06 AM    RBC 4.62 04/22/2024 11:06 AM    HGB 13.9 04/22/2024 11:06 AM    HCT 42.5 04/22/2024 11:06 AM    MCV 92.1 04/22/2024 11:06 AM    MCH 30.1 04/22/2024 11:06 AM    MCHC 32.7 04/22/2024 11:06 AM    RDW 15.1 04/22/2024 11:06 AM     04/22/2024 11:06 AM    MPV 7.9 04/22/2024 11:06 AM     BMP:    Lab Results   Component Value Date/Time     04/22/2024 11:06 AM    K 5.1 04/22/2024 11:06 AM     04/22/2024 11:06 AM    CO2 16 04/22/2024 11:06 AM    BUN 24 04/22/2024 11:06 AM    CREATININE 1.6 04/22/2024 11:06 AM    CALCIUM 8.8 04/22/2024 11:06 AM    GFRAA >60 07/25/2022 10:41 AM    GFRAA >60 02/11/2012 08:24 AM    LABGLOM 45 04/22/2024 11:06 AM    GLUCOSE 179 04/22/2024 11:06 AM     CT C-Spine W/O Contrast   Final Result   Multilevel degenerative disc disease, without evidence of spondylolisthesis   or gross fracture.         CT HEAD WO CONTRAST   Final Result   No acute intracranial process.         CT CHEST WO CONTRAST   Final Result   Subcapsular hematoma involving the left kidney with surrounding   retroperitoneal hemorrhage.  If the patient has not had any recent    procedure (lithotripsy), this is likely hematoma and hemorrhage is post   traumatic from the recent fall.      Nonobstructing renal calculi      No pneumonia or edema.         CTA ABDOMEN

## 2024-04-22 NOTE — ACP (ADVANCE CARE PLANNING)
Advanced Care Planning Note.    Purpose of Encounter: Advanced care planning in light of hospitalization  Parties In Attendance: Patient,  wife  Decisional Capacity: Yes  Subjective: Patient  understand that this conversation is to address long term care goal  Objective: Patient to the hospital with mechanical fall and left hematoma of the kidney history of diabetes and A-fib  Goals of Care Determination: Patient would pursue CPR and Intubation if required. No tracheostomy or long term ventilation if required.   Code Status: full code  Time spent on Advanced care Plannin minutes  Advanced Care Planning Documents: documented patient's wishes, would like Wife Yara Rodriguez  to make medical decisions if unable to make decisions    Blayne Loya MD  2024 1:31 PM

## 2024-04-22 NOTE — BRIEF OP NOTE
Brief Postoperative Note    Patient: Lawrence Rodriguez  YOB: 1950  MRN: [id number]      Pre-operative Diagnosis: Actively bleed left perinephric hematoma    Post-operative Diagnosis: Same    Procedure: Angiogram with emoblization    Anesthesia: Local and Moderate Sedation    Surgeons/Assistants: Slava Serrato DO    Estimated Blood Loss: less than 50     Complications: None    Infection Present At Time Of Surgery (PATOS) (choose all levels that have infection present):  No infection present    Specimens: Was Not Obtained    Findings:   Pseudoaneurysm with active bleeding along the lateral periphery of the kidney midpole.  Successful embolization of the active bleed wit a single 2 mm by 4 cm endovascular coil.  Post embolization DSA's demonstrated no evidence of active bleeding.     Right leg straight next 6 hours. Family updated immediate post procedure.      Slava Serrato DO  4/22/2024, 4:48 PM  Interventional Radiology  [phone] ([phone])

## 2024-04-22 NOTE — CONSENT
Informed Consent for Blood Component Transfusion Note    I have discussed with the patient the rationale for blood component transfusion; its benefits in treating or preventing fatigue, organ damage, or death; and its risk which includes mild transfusion reactions, rare risk of blood borne infection, or more serious but rare reactions. I have discussed the alternatives to transfusion, including the risk and consequences of not receiving transfusion. The patient had an opportunity to ask questions and had agreed to proceed with transfusion of blood components.    Electronically signed by Blayne Loya MD on 4/22/24 at 1:32 PM EDT

## 2024-04-22 NOTE — ED NOTES
Incentive spirometry given to pt with instructions and return demonstration. Pt verbalized understanding/ walked to lobby with a steady gait

## 2024-04-22 NOTE — ED NOTES
Pt fell this am while walking dog.  Left flank pain.  Last coumadin dose yesterday morning.  Took 2 tylenol this am at home.    Patient alert and oriented x4.  GCS 15/15.  Skin appropriate for ethnicity, dry and intact.  No signs of acute distress noted at this time. Regular respiratory pattern, normal respiratory depth, unlabored respirations.   Left flank pain.  10/10 pain.  Cardiac Rhythm afib.      Patient on a continuous pulse oximetry and telemetry monitoring. Bedside Monitor on with Alarms audible and alarms set.  Patient on cycling blood pressure.     Patient oriented to room and ED throughput process.  Patient educated on all orders, including any medications.  Patient educated on chief complaint/symptoms. Patient encouraged to ask questions regarding care, medications or treatment plan.  Patient aware of how to reach staff with questions/concerns.  Safety measures and Fall risk precautions in place, ED bed locked in lowest position, bed side table within reach, and call light in reach.  Plan of care ongoing.  Patient expresses no other needs at this time.

## 2024-04-22 NOTE — ED NOTES
How does patient ambulate?   []Low Fall Risk (ambulates by themselves without support)  []Stand by assist   []Contact Guard   []Front wheel walker  []Wheelchair   []Steady  []Bed bound  []History of Lower Extremity Amputation  [x]Unknown, did not assess in the emergency department   How does patient take pills?  []Whole with Water  []Crushed in applesauce  []Crushed in pudding  []Other  [x]Unknown no oral medications were given in the ED  Is patient alert?   [x]Alert  []Drowsy but responds to voice  []Doesn't respond to voice but responds to painful stimuli  []Unresponsive  Is patient oriented?   [x]To person  [x]To place  [x]To time  [x]To situation  []Confused  []Agitated  [x]Follows commands  If patient is disoriented or from a Skill Nursing Facility has family been notified of admission?   []Yes   [x]No  Patient belongings?   []Cell phone  []Wallet   []Dentures  [x]Clothing  Any specific patient or family belongings/needs/dynamics?   N/a  Miscellaneous comments/pending orders?  N/a     If there are any additional questions please reach out to the Emergency Department.

## 2024-04-22 NOTE — ED PROVIDER NOTES
[facility] EMERGENCY DEPARTMENT  EMERGENCY DEPARTMENT ENCOUNTER        Pt Name: Lawrence Rodriguez  MRN: [id number]  Birthdate 1950  Date of evaluation: 4/22/2024  Provider: Candace Diana MD  PCP: Jose Brown MD  Note Started: 7:31 AM EDT 4/22/24    CHIEF COMPLAINT     I fell  HISTORY OF PRESENT ILLNESS: 1 or more Elements     Chief Complaint   Patient presents with    Fall     Fell this AM around 4AM walking a dog. Fell on left flank. Unable to get comfortable. Pain 7 or 8 out of 10. Denies SOB. Took 2 tylenol at home.      History from : Patient  Limitations to history : None    Lawrence Rodriguez is a 74 y.o. male who presents to the emergency department secondary to concern for left-sided rib pain after a fall.  He states he is currently babysitting his kids dog and he woke up at 4 AM to take outside.  While on the grass because it was slick he tripped and fell landing on his left side.  He does not think he hit his head.  He did not lose consciousness.  He took some Tylenol but the pain is still pretty severe so he wanted to get checked out.  He states about 5 months ago he was watching his other kids dog when he fell and landed on the right side and at that time he got 2 rib fractures.  He is able to walk.  No nausea or vomiting.  The Tylenol might of helped a little bit but minimally.  He is on Coumadin for history of A-fib.    Past medical history noted below.  Denies smoking.  Aside from what is stated above denies any other symptoms or modifying factors.    Nursing Notes were all reviewed and agreed with or any disagreements addressed in HPI/MDM.  REVIEW OF SYSTEMS :    Review of Systems Pertinent positive and negative findings as documented in the HPI  PAST MEDICAL HISTORY      Past Medical History:   Diagnosis Date    Arthritis     Atrial fibrillation (HCC)     CAD (coronary artery disease)     Cancer (HCC)     skin    Diabetes mellitus (HCC)     Hypercholesteremia      Hypertension     Obesity     Sleep apnea     does not use Cpap machine at present    Ulcer of pyloric antrum 2/2014    anemia/GI bleed while on NSAIDS    Wears glasses        SURGICALHISTORY       Past Surgical History:   Procedure Laterality Date    ARM SURGERY Right 1/25/2022    RIGHT ULNAR NERVE DECOMPRESSION (AT ELBOW) performed by Yogi Murphy MD at [facility] OR    ARM SURGERY Left 3/21/2024    LEFT ULNAR NERVE DECOMPRESSION (AT ELBOW) performed by Yogi Murphy MD at [facility] OR    CARPAL TUNNEL RELEASE Right 1/25/2022    RIGHT CARPAL TUNNEL RELEASE performed by Yogi Murphy MD at [facility] OR    CARPAL TUNNEL RELEASE Left 3/21/2024    LEFT CARPAL TUNNEL RELEASE performed by Yogi Murphy MD at [facility] OR    COLONOSCOPY      COLONOSCOPY N/A 11/16/2023    COLONOSCOPY POLYPECTOMY SNARE/COLD BIOPSY performed by Bony Diggs MD at [facility] ENDOSCOPY    CYST REMOVAL      from back side--fatty lipoma    DIAGNOSTIC CARDIAC CATH LAB PROCEDURE  8/24/06    KNEE SURGERY      arthroscopy bilateral    UPPER GASTROINTESTINAL ENDOSCOPY  02/07/14    EGD/Colonoscopy with biopsy of ulcer    UPPER GASTROINTESTINAL ENDOSCOPY  03/19/2018    with bx     CURRENT MEDICATIONS       Previous Medications    ASPIRIN EC 81 MG EC TABLET    Take 1 tablet by mouth daily    ATORVASTATIN (LIPITOR) 10 MG TABLET    Take 1 tablet by mouth daily.    DILTIAZEM (DILACOR XR) 120 MG EXTENDED RELEASE CAPSULE    Take 1 capsule by mouth daily    EMPAGLIFLOZIN (JARDIANCE) 10 MG TABLET    TAKE 1 TABLET BY MOUTH DAILY    FUROSEMIDE (LASIX) 20 MG TABLET    TAKE 1 TABLET BY MOUTH DAILY OR AS DIRECTED    GLIMEPIRIDE (AMARYL) 1 MG TABLET    Take 1 tablet by mouth 2 times daily    METOPROLOL TARTRATE (LOPRESSOR) 25 MG TABLET    TAKE 1 TABLET BY MOUTH TWICE DAILY    OZEMPIC, 0.25 OR 0.5 MG/DOSE, 2 MG/3ML SOPN    Inject 2 mg into the skin once a week    PANTOPRAZOLE (PROTONIX) 40 MG TABLET    TAKE 1 TABLET(40 MG) BY MOUTH DAILY    SPIRONOLACTONE (ALDACTONE) 25 MG

## 2024-04-22 NOTE — PROGRESS NOTES
04/22/24 1831   Incentive Spirometry Tx   Predicted Volume 753   Achieved Volume (mL) 1000 mL

## 2024-04-22 NOTE — PRE SEDATION
Sedation Pre-Procedure Note    Patient Name: Lawrence Rodriguez   YOB: 1950  Room/Bed: WHITNEY/NONE  Medical Record Number: [id number]  Date: 4/22/2024   Time: 2:51 PM       Indication:  Left perinephric hematoma with active bleeding on CTA. Angiogram with possible embolization.     Consent: I have discussed with the patient and/or the patient representative the indication, alternatives, and the possible risks and/or complications of the planned procedure and the anesthesia methods. The patient and/or patient representative appear to understand and agree to proceed.    Vital Signs:   Vitals:    04/22/24 1430   BP: (!) 141/112   Pulse: 72   Resp: 13   Temp:    SpO2: 100%       Past Medical History:   has a past medical history of Arthritis, Atrial fibrillation (HCC), CAD (coronary artery disease), Cancer (HCC), Diabetes mellitus (HCC), Hypercholesteremia, Hypertension, Obesity, Sleep apnea, Ulcer of pyloric antrum, and Wears glasses.    Past Surgical History:   has a past surgical history that includes Diagnostic Cardiac Cath Lab Procedure (8/24/06); knee surgery; Colonoscopy; Upper gastrointestinal endoscopy (02/07/14); cyst removal; Upper gastrointestinal endoscopy (03/19/2018); Arm Surgery (Right, 1/25/2022); Carpal tunnel release (Right, 1/25/2022); Colonoscopy (N/A, 11/16/2023); Arm Surgery (Left, 3/21/2024); and Carpal tunnel release (Left, 3/21/2024).    Medications:   Scheduled Meds:    lidocaine  1 patch TransDERmal Once    insulin lispro  0-8 Units SubCUTAneous TID WC    insulin lispro  0-4 Units SubCUTAneous Nightly     Continuous Infusions:    lactated ringers IV soln       PRN Meds: morphine  Home Meds:   Prior to Admission medications    Medication Sig Start Date End Date Taking? Authorizing Provider   warfarin (COUMADIN) 3 MG tablet Take 1 tablet by mouth daily   Yes Provider, MD Alis   dilTIAZem (DILACOR XR) 120 MG extended release capsule Take 1 capsule by mouth daily    Provider,

## 2024-04-22 NOTE — ED NOTES
Report given to IR. Pt transported to IR by IR RN's.  Report given to ROHINI Woodward on 3T and updated on pts going to IR at this time.

## 2024-04-22 NOTE — PROGRESS NOTES
Pharmacy Home Medication Reconciliation Note    A medication reconciliation has been completed for Lawrence Rodriguez 1950    [facility]  Information provided by: patient, fill history    The patient's home medication list is as follows:  No current facility-administered medications on file prior to encounter.     Current Outpatient Medications on File Prior to Encounter   Medication Sig Dispense Refill    warfarin (COUMADIN) 3 MG tablet Take 1 tablet by mouth daily      dilTIAZem (DILACOR XR) 120 MG extended release capsule Take 1 capsule by mouth daily      OZEMPIC, 0.25 OR 0.5 MG/DOSE, 2 MG/3ML SOPN Inject 2 mg into the skin once a week      metoprolol tartrate (LOPRESSOR) 25 MG tablet TAKE 1 TABLET BY MOUTH TWICE DAILY 180 tablet 3    furosemide (LASIX) 20 MG tablet TAKE 1 TABLET BY MOUTH DAILY OR AS DIRECTED (Patient not taking: Reported on 4/22/2024) 90 tablet 0    empagliflozin (JARDIANCE) 10 MG tablet TAKE 1 TABLET BY MOUTH DAILY (Patient taking differently: Take 2.5 tablets by mouth daily) 90 tablet 0    [DISCONTINUED] Cholecalciferol (VITAMIN D) 50 MCG (2000 UT) CAPS capsule Take by mouth      spironolactone (ALDACTONE) 25 MG tablet TAKE 1 TABLET BY MOUTH DAILY 90 tablet 0    glimepiride (AMARYL) 1 MG tablet Take 1 tablet by mouth 2 times daily      pantoprazole (PROTONIX) 40 MG tablet TAKE 1 TABLET(40 MG) BY MOUTH DAILY 90 tablet 0    [DISCONTINUED] warfarin (COUMADIN) 5 MG tablet One tab daily, as directed  On Saturday 7/13 and Sunday 7/14 take only 1/2 tabs.  Get INR on Monday 7/15 (Patient taking differently: Take 3 mg by mouth daily Managed by PCP) 30 tablet 3    zolpidem (AMBIEN) 10 MG tablet Take 1 tablet by mouth nightly as needed for Sleep.  2    atorvastatin (LIPITOR) 10 MG tablet Take 1 tablet by mouth daily. 30 tablet 0    aspirin EC 81 MG EC tablet Take 1 tablet by mouth daily 30 tablet 3       Patient is no longer taking Lasix.    Of note, patient takes Coumadin 3mg

## 2024-04-23 ENCOUNTER — TELEPHONE (OUTPATIENT)
Dept: CARDIOLOGY CLINIC | Age: 74
End: 2024-04-23

## 2024-04-23 PROBLEM — S37.091A: Status: ACTIVE | Noted: 2024-04-22
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
## 2024-04-23 PROBLEM — S37.092A TRAUMATIC PERINEPHRIC HEMATOMA OF LEFT KIDNEY: Status: ACTIVE | Noted: 2024-04-22

## 2024-04-23 LAB
ANION GAP SERPL CALCULATED.3IONS-SCNC: 14 MMOL/L (ref 3–16)
ANION GAP SERPL CALCULATED.3IONS-SCNC: 18 MMOL/L (ref 3–16)
ANION GAP SERPL CALCULATED.3IONS-SCNC: 18 MMOL/L (ref 3–16)
BASOPHILS # BLD: 0.1 K/UL (ref 0–0.2)
BASOPHILS NFR BLD: 0.4 %
BUN SERPL-MCNC: 33 MG/DL (ref 7–20)
BUN SERPL-MCNC: 36 MG/DL (ref 7–20)
BUN SERPL-MCNC: 43 MG/DL (ref 7–20)
CALCIUM SERPL-MCNC: 8.5 MG/DL (ref 8.3–10.6)
CALCIUM SERPL-MCNC: 8.6 MG/DL (ref 8.3–10.6)
CALCIUM SERPL-MCNC: 8.8 MG/DL (ref 8.3–10.6)
CHLORIDE SERPL-SCNC: 100 MMOL/L (ref 99–110)
CHLORIDE SERPL-SCNC: 101 MMOL/L (ref 99–110)
CHLORIDE SERPL-SCNC: 99 MMOL/L (ref 99–110)
CO2 SERPL-SCNC: 16 MMOL/L (ref 21–32)
CO2 SERPL-SCNC: 16 MMOL/L (ref 21–32)
CO2 SERPL-SCNC: 18 MMOL/L (ref 21–32)
CREAT SERPL-MCNC: 2.5 MG/DL (ref 0.8–1.3)
CREAT SERPL-MCNC: 2.7 MG/DL (ref 0.8–1.3)
CREAT SERPL-MCNC: 2.7 MG/DL (ref 0.8–1.3)
DEPRECATED RDW RBC AUTO: 14.8 % (ref 12.4–15.4)
EOSINOPHIL # BLD: 0 K/UL (ref 0–0.6)
EOSINOPHIL NFR BLD: 0.1 %
GFR SERPLBLD CREATININE-BSD FMLA CKD-EPI: 24 ML/MIN/{1.73_M2}
GFR SERPLBLD CREATININE-BSD FMLA CKD-EPI: 24 ML/MIN/{1.73_M2}
GFR SERPLBLD CREATININE-BSD FMLA CKD-EPI: 26 ML/MIN/{1.73_M2}
GLUCOSE BLD-MCNC: 106 MG/DL (ref 70–99)
GLUCOSE BLD-MCNC: 117 MG/DL (ref 70–99)
GLUCOSE BLD-MCNC: 142 MG/DL (ref 70–99)
GLUCOSE SERPL-MCNC: 105 MG/DL (ref 70–99)
GLUCOSE SERPL-MCNC: 127 MG/DL (ref 70–99)
GLUCOSE SERPL-MCNC: 146 MG/DL (ref 70–99)
HCT VFR BLD AUTO: 32.1 % (ref 40.5–52.5)
HCT VFR BLD AUTO: 37.7 % (ref 40.5–52.5)
HGB BLD-MCNC: 10.7 G/DL (ref 13.5–17.5)
HGB BLD-MCNC: 12 G/DL (ref 13.5–17.5)
INR PPP: 2.17 (ref 0.85–1.15)
LYMPHOCYTES # BLD: 1.8 K/UL (ref 1–5.1)
LYMPHOCYTES NFR BLD: 9.2 %
MCH RBC QN AUTO: 29.7 PG (ref 26–34)
MCHC RBC AUTO-ENTMCNC: 31.9 G/DL (ref 31–36)
MCV RBC AUTO: 93.1 FL (ref 80–100)
MONOCYTES # BLD: 1.7 K/UL (ref 0–1.3)
MONOCYTES NFR BLD: 8.7 %
NEUTROPHILS # BLD: 15.8 K/UL (ref 1.7–7.7)
NEUTROPHILS NFR BLD: 81.6 %
PERFORMED ON: ABNORMAL
PLATELET # BLD AUTO: 237 K/UL (ref 135–450)
PMV BLD AUTO: 8.8 FL (ref 5–10.5)
POTASSIUM SERPL-SCNC: 5.3 MMOL/L (ref 3.5–5.1)
POTASSIUM SERPL-SCNC: 5.5 MMOL/L (ref 3.5–5.1)
POTASSIUM SERPL-SCNC: 5.9 MMOL/L (ref 3.5–5.1)
PROTHROMBIN TIME: 24.2 SEC (ref 11.9–14.9)
RBC # BLD AUTO: 4.05 M/UL (ref 4.2–5.9)
SODIUM SERPL-SCNC: 132 MMOL/L (ref 136–145)
SODIUM SERPL-SCNC: 133 MMOL/L (ref 136–145)
SODIUM SERPL-SCNC: 135 MMOL/L (ref 136–145)
WBC # BLD AUTO: 19.3 K/UL (ref 4–11)

## 2024-04-23 PROCEDURE — 85014 HEMATOCRIT: CPT

## 2024-04-23 PROCEDURE — 97165 OT EVAL LOW COMPLEX 30 MIN: CPT

## 2024-04-23 PROCEDURE — 2580000003 HC RX 258: Performed by: INTERNAL MEDICINE

## 2024-04-23 PROCEDURE — 85610 PROTHROMBIN TIME: CPT

## 2024-04-23 PROCEDURE — 2580000003 HC RX 258: Performed by: PEDIATRICS

## 2024-04-23 PROCEDURE — 80048 BASIC METABOLIC PNL TOTAL CA: CPT

## 2024-04-23 PROCEDURE — 6360000002 HC RX W HCPCS: Performed by: INTERNAL MEDICINE

## 2024-04-23 PROCEDURE — 85018 HEMOGLOBIN: CPT

## 2024-04-23 PROCEDURE — 97535 SELF CARE MNGMENT TRAINING: CPT

## 2024-04-23 PROCEDURE — 85025 COMPLETE CBC W/AUTO DIFF WBC: CPT

## 2024-04-23 PROCEDURE — 2140000000 HC CCU INTERMEDIATE R&B

## 2024-04-23 PROCEDURE — 97161 PT EVAL LOW COMPLEX 20 MIN: CPT

## 2024-04-23 PROCEDURE — 6370000000 HC RX 637 (ALT 250 FOR IP): Performed by: INTERNAL MEDICINE

## 2024-04-23 PROCEDURE — 97530 THERAPEUTIC ACTIVITIES: CPT

## 2024-04-23 RX ORDER — SODIUM CHLORIDE 9 MG/ML
INJECTION, SOLUTION INTRAVENOUS CONTINUOUS
Status: DISCONTINUED | OUTPATIENT
Start: 2024-04-23 | End: 2024-04-24

## 2024-04-23 RX ORDER — 0.9 % SODIUM CHLORIDE 0.9 %
500 INTRAVENOUS SOLUTION INTRAVENOUS ONCE
Status: COMPLETED | OUTPATIENT
Start: 2024-04-23 | End: 2024-04-23

## 2024-04-23 RX ADMIN — PANTOPRAZOLE SODIUM 40 MG: 40 TABLET, DELAYED RELEASE ORAL at 05:09

## 2024-04-23 RX ADMIN — SODIUM CHLORIDE: 9 INJECTION, SOLUTION INTRAVENOUS at 07:16

## 2024-04-23 RX ADMIN — SODIUM CHLORIDE, PRESERVATIVE FREE 10 ML: 5 INJECTION INTRAVENOUS at 22:07

## 2024-04-23 RX ADMIN — METOPROLOL TARTRATE 25 MG: 25 TABLET, FILM COATED ORAL at 22:03

## 2024-04-23 RX ADMIN — ONDANSETRON 4 MG: 2 INJECTION INTRAMUSCULAR; INTRAVENOUS at 01:01

## 2024-04-23 RX ADMIN — SODIUM CHLORIDE 500 ML: 9 INJECTION, SOLUTION INTRAVENOUS at 12:00

## 2024-04-23 RX ADMIN — SODIUM CHLORIDE, PRESERVATIVE FREE 10 ML: 5 INJECTION INTRAVENOUS at 01:01

## 2024-04-23 RX ADMIN — ONDANSETRON 4 MG: 2 INJECTION INTRAMUSCULAR; INTRAVENOUS at 07:18

## 2024-04-23 RX ADMIN — ATORVASTATIN CALCIUM 10 MG: 10 TABLET, FILM COATED ORAL at 08:44

## 2024-04-23 RX ADMIN — SODIUM CHLORIDE: 9 INJECTION, SOLUTION INTRAVENOUS at 05:14

## 2024-04-23 NOTE — TELEPHONE ENCOUNTER
In hospital for a fall that damaged left kidney. He is in CVU.  Ramesh his son stopped by because he is concerned that this his 2nd fall in a week.  BP is has been low from sitting to standing.  Standing drops significantly.  I asked him if he is requesting a  cardio consult but wanted me to let HARRY know he was here and if she needs to see him.  Thanks

## 2024-04-23 NOTE — CARE COORDINATION
Chart reviewed at this time for discharge planning.    Pt had IR procedure after a fall. Therapy evaluations are pending. CM will follow for discharge plan.    Tatyana Cano RN, BSN  [phone]

## 2024-04-23 NOTE — PROGRESS NOTES
Physical and Occupational Therapy  Lawrence Rodriguez  1950    0935: Attempted to see patient for PT/OT evaluation. RN present in room and states that patient was very lightheaded/dizzy after ambulating to the bathroom. Patient just returned to bed, RN preparing to start IV fluids. Will follow up when patient is medically appropriate for a skilled PT/OT evaluation.     Diana Westfall PT, DPT [id number]  Malu Quispe, OTR/L, [id number]

## 2024-04-23 NOTE — SIGNIFICANT EVENT
K this morning resulted 5.5   Discontinue LR   NS at 75 ml/hr   Recheck BMP in about 4 hrs     Prakash Conti MD

## 2024-04-23 NOTE — PROGRESS NOTES
(Temporal)   Resp 24   Ht 1.803 m (5' 11\")   Wt 111.8 kg (246 lb 7.6 oz)   SpO2 95%   BMI 34.38 kg/m²   General appearance: No apparent distress, appears stated age and cooperative.  HEENT: Normocephalic, atraumatic, MMM, No sclera icterus/conjuctival palor  Neck: Supple, no thyromegally. No jugular venous distention.   Respiratory:  Normal respiratory effort. Clear to auscultation, no Rales/Wheezes/Rhonchi.  Cardiovascular: S1/S2 without murmurs, rubs or gallops. RRR  Abdomen: Soft, non-tender, non-distended, bowel sounds present.  Musculoskeletal: No clubbing, cyanosis or edema bilaterally.    Skin: Skin color, texture, turgor normal.  No rashes or lesions.  Neurologic:  Cranial nerves: II-XII intact, ANDRY, No focal sensory/motor deficits  Psychiatric: Alert and oriented, thought content appropriate  Capillary Refill: Brisk,< 3 seconds   Peripheral Pulses: +2 palpable, equal bilaterally       Intake/Output Summary (Last 24 hours) at 4/23/2024 1304  Last data filed at 4/23/2024 0544  Gross per 24 hour   Intake 468.09 ml   Output 650 ml   Net -181.91 ml       Labs:   Recent Labs     04/22/24  0827 04/22/24  1106 04/22/24 2036 04/23/24  0509   WBC  --  16.6*  --  19.3*   HGB  --  13.9 13.1* 12.0*   HCT  --  42.5 41.6 37.7*   PLT  --  232  --  237   INR 1.91*  --   --  2.17*      Recent Labs     04/22/24  1106 04/23/24  0509 04/23/24  1039   * 135* 133*   K 5.1 5.5* 5.9*    101 99   CO2 16* 16* 16*   BUN 24* 33* 36*   CREATININE 1.6* 2.5* 2.7*   CALCIUM 8.8 8.8 8.6     No results for input(s): \"CKTOTAL\", \"TROPONINI\" in the last 72 hours.    Urinalysis:    Lab Results   Component Value Date/Time    NITRU Negative 04/22/2024 10:27 PM    WBCUA 1 04/22/2024 10:27 PM    BACTERIA None Seen 04/22/2024 10:27 PM    RBCUA 1 04/22/2024 10:27 PM    BLOODU Negative 04/22/2024 10:27 PM    SPECGRAV >=1.030 04/22/2024 10:27 PM    GLUCOSEU >=1000 04/22/2024 10:27 PM       Radiology:  IR EMBOLIZATION HEMORRHAGE    Final Result   Successful left renal angiogram which demonstrated active extravasation along   the lateral midpole, supplied by a small caliber segmental branch of the left   renal artery.  Selective catheterization of this bleeding vessel was   technically challenging.      Successful embolization of the feeding segmental branch supplying the active   focus of extravasation using a single 2 mm x 4 cm endovascular coil.  Post   embolization DSA demonstrated no evidence of residual active bleeding or   pseudoaneurysm formations.      Patient's family was updated immediately postprocedure.         CTA ABDOMEN PELVIS W CONTRAST   Final Result   1. Large heterogeneous left perinephric hematoma with active contrast   extravasation extending from the junction of the mid to lower pole. On the   delayed images, there was pooling of contrast and a cortical defect   suggesting a laceration.   2. Bilateral nephrolithiasis      RECOMMENDATIONS:   The findings were discussed with the interventional radiologist at 2:45 p.m.   on 04/22/2024         CT C-Spine W/O Contrast   Final Result   Multilevel degenerative disc disease, without evidence of spondylolisthesis   or gross fracture.         CT HEAD WO CONTRAST   Final Result   No acute intracranial process.         CT CHEST WO CONTRAST   Final Result   Subcapsular hematoma involving the left kidney with surrounding   retroperitoneal hemorrhage.  If the patient has not had any recent    procedure (lithotripsy), this is likely hematoma and hemorrhage is post   traumatic from the recent fall.      Nonobstructing renal calculi      No pneumonia or edema.                 Clint Laguna MD      Please excuse brevity and/or typos. This report was transcribed using voice recognition software. Every effort was made to ensure accuracy, however, inadvertent computerized transcription errors may be present.

## 2024-04-23 NOTE — PROGRESS NOTES
Physician Progress Note      PATIENT:               STACY LOPEZ  CSN #:                  [id number]  :                       1950  ADMIT DATE:       2024 7:26 AM  DISCH DATE:  RESPONDING  PROVIDER #:        Clint Laguna MD          QUERY TEXT:    Patient admitted with subcapsular hematoma of left kidney and is on chronic   anticoagulation.   If possible, please document in the progress notes and   discharge summary if you are evaluating and/or treating any of the following:    The medical record reflects the following:  Risk Factors: subcapsular hematoma of left kidney, on Coumadin    Clinical Indicators: Per  HP- \"Mechanical fall with subcapsular hematoma   of left kidney. Inr 1.9 no need to reverse at this time per ir. PAF home meds   hold coumadin monitor inr\"  INR/PT- 1.91/21.9, /.2  S/P- Angiogram with emoblization- The bleeding vessel was embolized with a   single 2 mm x 4 cm helical Concerto  coil. Post embolization DSA demonstrated no evidence of residual active   bleeding.    Treatment: labs, Coumadin held, IR consult with embolization of bleeding   vessel  Options provided:  -- Subcapsular hematoma of left kidney associated with Coumadin  -- Subcapsular hematoma of left kidney unrelated to anticoagulation  -- Other - I will add my own diagnosis  -- Disagree - Not applicable / Not valid  -- Disagree - Clinically unable to determine / Unknown  -- Refer to Clinical Documentation Reviewer    PROVIDER RESPONSE TEXT:    This patient has subcapsular hematoma of left kidney associated with Coumadin.    Query created by: Marie Byrd on 2024 11:47 AM      Electronically signed by:  Clint Laguna MD 2024 1:03 PM

## 2024-04-23 NOTE — PROGRESS NOTES
[facility] - Inpatient Rehabilitation Department   Phone: [phone]    Occupational Therapy    [x] Initial Evaluation            [] Daily Treatment Note         [] Discharge Summary      Patient: Lawrence Rodriguez   : 1950   MRN: [id number]   Date of Service:  2024    Admitting Diagnosis:  Traumatic perinephric hematoma of left kidney  Current Admission Summary: 74-year-old gentleman with history of chronic atrial fibrillation on anticoagulation, stage III CKD with baseline creatinine of 1.6, type 2 diabetes, hypertension, CAD, hyperlipidemia, obesity sleep apnea who presented following a mechanical fall while walking his dog sustaining a left perinephric hematoma with active extravasation status post successful IR embolization of the left renal segmental branch artery 2023     Past Medical History:  has a past medical history of Arthritis, Atrial fibrillation (HCC), CAD (coronary artery disease), Cancer (HCC), Diabetes mellitus (HCC), Hypercholesteremia, Hypertension, Obesity, Sleep apnea, Ulcer of pyloric antrum, and Wears glasses.  Past Surgical History:  has a past surgical history that includes Diagnostic Cardiac Cath Lab Procedure (06); knee surgery; Colonoscopy; Upper gastrointestinal endoscopy (14); cyst removal; Upper gastrointestinal endoscopy (2018); Arm Surgery (Right, 2022); Carpal tunnel release (Right, 2022); Colonoscopy (N/A, 2023); Arm Surgery (Left, 3/21/2024); Carpal tunnel release (Left, 3/21/2024); and IR EMBOLIZATION HEMORRHAGE (2024).    Discharge Recommendations: Lawrence Rodriguez scored a 19/24 on the AM-PAC ADL Inpatient form. Current research shows that an AM-PAC score of 18 or greater is typically associated with a discharge to the patient's home setting. Based on the patient's AM-PAC score, and their current ADL deficits, it is recommended that the patient have 2-3 sessions per week of Occupational Therapy at d/c to

## 2024-04-23 NOTE — PROGRESS NOTES
Late entry:    No bleeding, hematoma, bruising at right groin. Pulses at +1 observed on pedal pulse. Kept bedrest til 2300. RN reminded and provided health teaching with right femoral accessed precautions.

## 2024-04-23 NOTE — PROGRESS NOTES
[facility] - Inpatient Rehabilitation Department   Phone: [phone]    Physical Therapy    [x] Initial Evaluation            [] Daily Treatment Note         [] Discharge Summary      Patient: Lawrence Rodriguez   : 1950   MRN: [id number]   Date of Service:  2024  Admitting Diagnosis: Traumatic perinephric hematoma of left kidney  Current Admission Summary: Lawrence Rodriguez is a 74-year-old with history of chronic atrial fibrillation on anticoagulation, stage III CKD with baseline creatinine of 1.6, type 2 diabetes, hypertension, CAD, hyperlipidemia, obesity sleep apnea who presented following a mechanical fall while walking his dog sustaining a left perinephric hematoma with active extravasation status post successful IR embolization of the left renal segmental branch artery 2023   Past Medical History:  has a past medical history of Arthritis, Atrial fibrillation (HCC), CAD (coronary artery disease), Cancer (HCC), Diabetes mellitus (HCC), Hypercholesteremia, Hypertension, Obesity, Sleep apnea, Ulcer of pyloric antrum, and Wears glasses.  Past Surgical History:  has a past surgical history that includes Diagnostic Cardiac Cath Lab Procedure (06); knee surgery; Colonoscopy; Upper gastrointestinal endoscopy (14); cyst removal; Upper gastrointestinal endoscopy (2018); Arm Surgery (Right, 2022); Carpal tunnel release (Right, 2022); Colonoscopy (N/A, 2023); Arm Surgery (Left, 3/21/2024); Carpal tunnel release (Left, 3/21/2024); and IR EMBOLIZATION HEMORRHAGE (2024).  Discharge Recommendations: Lawrence Rodriguez scored a 20/24 on the AM-PAC short mobility form. Current research shows that an AM-PAC score of 18 or greater is typically associated with a discharge to the patient's home setting. Based on the patient's AM-PAC score and their current functional mobility deficits, it is recommended that the patient have 2-3 sessions per week of Physical Therapy at  d/c to increase the patient's independence.  At this time, this patient demonstrates the endurance and safety to discharge home with home health PT and a follow up treatment frequency of 2-3x/wk.  Please see assessment section for further patient specific details. If patient discharges prior to next session this note will serve as a discharge summary.  Please see below for the latest assessment towards goals.       HOME HEALTH CARE: LEVEL 1 STANDARD    - Initial home health evaluation to occur within 24-48 hours, in patient home   - Therapy to evaluate with goal of regaining prior level of functioning   - Therapy to evaluate if patient has Home Health Aide needs for personal care    DME Required For Discharge: patient has all required DME for discharge  Precautions/Restrictions: high fall risk  Weight Bearing Restrictions: no restrictions  [] Right Upper Extremity  [] Left Upper Extremity [] Right Lower Extremity  [] Left Lower Extremity     Required Braces/Orthotics: no braces required   [] Right  [] Left  Positional Restrictions:no positional restrictions    Pre-Admission Information   Lives With: spouse    Type of Home: house  Home Layout: two level, bedroom/bathroom upstairs, 13 stairs to 2nd level with R HR  Home Access:  2 step to enter without rails   Bathroom Layout: walk in shower  Bathroom Equipment:  no prior equipment   Toilet Height: standard height, elevated height  Home Equipment: rolling walker  Transfer Assistance: Independent without use of device  Ambulation Assistance:Independent without use of device  ADL Assistance: independent with all ADL's  IADL Assistance: independent with homemaking tasks, shares with wife  Active :        [x] Yes  [] No  Hand Dominance: [] Left  [x] Right  Current Employment: retired.  Occupation: [employer]   Hobbies: Watch movies, sports, gambling   Recent Falls: Admitted for a fall. Reports 3 additional falls within the last 6 months. Two caused by

## 2024-04-23 NOTE — FLOWSHEET NOTE
Post procedure chart reviewed, please call radiology RN with any questions/ concerns r/t embolization on 4/22/23

## 2024-04-24 ENCOUNTER — APPOINTMENT (OUTPATIENT)
Dept: CT IMAGING | Age: 74
End: 2024-04-24
Payer: COMMERCIAL

## 2024-04-24 PROBLEM — E87.5 HYPERKALEMIA: Status: ACTIVE | Noted: 2024-04-24
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
## 2024-04-24 PROBLEM — D72.829 LEUCOCYTOSIS: Status: ACTIVE | Noted: 2024-04-24

## 2024-04-24 PROBLEM — D62 ACUTE BLOOD LOSS ANEMIA: Status: ACTIVE | Noted: 2024-04-24

## 2024-04-24 PROBLEM — N17.9 AKI (ACUTE KIDNEY INJURY) (HCC): Status: ACTIVE | Noted: 2024-04-24

## 2024-04-24 LAB
ABO + RH BLD: NORMAL
ANION GAP SERPL CALCULATED.3IONS-SCNC: 10 MMOL/L (ref 3–16)
BASOPHILS # BLD: 0 K/UL (ref 0–0.2)
BASOPHILS NFR BLD: 0 %
BLD GP AB SCN SERPL QL: NORMAL
BLOOD BANK DISPENSE STATUS: NORMAL
BLOOD BANK PRODUCT CODE: NORMAL
BPU ID: NORMAL
BUN SERPL-MCNC: 41 MG/DL (ref 7–20)
CALCIUM SERPL-MCNC: 8.2 MG/DL (ref 8.3–10.6)
CHLORIDE SERPL-SCNC: 103 MMOL/L (ref 99–110)
CO2 SERPL-SCNC: 19 MMOL/L (ref 21–32)
CREAT SERPL-MCNC: 2.4 MG/DL (ref 0.8–1.3)
DEPRECATED RDW RBC AUTO: 15.1 % (ref 12.4–15.4)
DESCRIPTION BLOOD BANK: NORMAL
EOSINOPHIL # BLD: 0.2 K/UL (ref 0–0.6)
EOSINOPHIL NFR BLD: 1 %
GFR SERPLBLD CREATININE-BSD FMLA CKD-EPI: 28 ML/MIN/{1.73_M2}
GLUCOSE BLD-MCNC: 98 MG/DL (ref 70–99)
GLUCOSE SERPL-MCNC: 74 MG/DL (ref 70–99)
HCT VFR BLD AUTO: 27.6 % (ref 40.5–52.5)
HCT VFR BLD AUTO: 29.1 % (ref 40.5–52.5)
HCT VFR BLD AUTO: 30.7 % (ref 40.5–52.5)
HGB BLD-MCNC: 10.3 G/DL (ref 13.5–17.5)
HGB BLD-MCNC: 8.9 G/DL (ref 13.5–17.5)
HGB BLD-MCNC: 9.2 G/DL (ref 13.5–17.5)
INR PPP: 2.04 (ref 0.85–1.15)
LEFT VENTRICULAR EJECTION FRACTION MODE: NORMAL
LV EF: 68 %
LYMPHOCYTES # BLD: 1.6 K/UL (ref 1–5.1)
LYMPHOCYTES NFR BLD: 10 %
MCH RBC QN AUTO: 30 PG (ref 26–34)
MCHC RBC AUTO-ENTMCNC: 32.4 G/DL (ref 31–36)
MCV RBC AUTO: 92.5 FL (ref 80–100)
MONOCYTES # BLD: 0.9 K/UL (ref 0–1.3)
MONOCYTES NFR BLD: 6 %
NEUTROPHILS # BLD: 12.9 K/UL (ref 1.7–7.7)
NEUTROPHILS NFR BLD: 82 %
NEUTS BAND NFR BLD MANUAL: 1 % (ref 0–7)
PERFORMED ON: NORMAL
PLATELET # BLD AUTO: 178 K/UL (ref 135–450)
PMV BLD AUTO: 8.2 FL (ref 5–10.5)
POTASSIUM SERPL-SCNC: 5.5 MMOL/L (ref 3.5–5.1)
PROTHROMBIN TIME: 23.1 SEC (ref 11.9–14.9)
RBC # BLD AUTO: 2.98 M/UL (ref 4.2–5.9)
RBC MORPH BLD: NORMAL
SODIUM SERPL-SCNC: 132 MMOL/L (ref 136–145)
WBC # BLD AUTO: 15.6 K/UL (ref 4–11)

## 2024-04-24 PROCEDURE — P9016 RBC LEUKOCYTES REDUCED: HCPCS

## 2024-04-24 PROCEDURE — 99222 1ST HOSP IP/OBS MODERATE 55: CPT | Performed by: SURGERY

## 2024-04-24 PROCEDURE — 6370000000 HC RX 637 (ALT 250 FOR IP): Performed by: INTERNAL MEDICINE

## 2024-04-24 PROCEDURE — 2060000000 HC ICU INTERMEDIATE R&B

## 2024-04-24 PROCEDURE — 2580000003 HC RX 258: Performed by: PEDIATRICS

## 2024-04-24 PROCEDURE — 36415 COLL VENOUS BLD VENIPUNCTURE: CPT

## 2024-04-24 PROCEDURE — C8929 TTE W OR WO FOL WCON,DOPPLER: HCPCS

## 2024-04-24 PROCEDURE — 74176 CT ABD & PELVIS W/O CONTRAST: CPT

## 2024-04-24 PROCEDURE — 86850 RBC ANTIBODY SCREEN: CPT

## 2024-04-24 PROCEDURE — 85018 HEMOGLOBIN: CPT

## 2024-04-24 PROCEDURE — 30233N1 TRANSFUSION OF NONAUTOLOGOUS RED BLOOD CELLS INTO PERIPHERAL VEIN, PERCUTANEOUS APPROACH: ICD-10-PCS | Performed by: UROLOGY

## 2024-04-24 PROCEDURE — 36430 TRANSFUSION BLD/BLD COMPNT: CPT

## 2024-04-24 PROCEDURE — 86900 BLOOD TYPING SEROLOGIC ABO: CPT

## 2024-04-24 PROCEDURE — 86901 BLOOD TYPING SEROLOGIC RH(D): CPT

## 2024-04-24 PROCEDURE — 80048 BASIC METABOLIC PNL TOTAL CA: CPT

## 2024-04-24 PROCEDURE — 86923 COMPATIBILITY TEST ELECTRIC: CPT

## 2024-04-24 PROCEDURE — 94760 N-INVAS EAR/PLS OXIMETRY 1: CPT

## 2024-04-24 PROCEDURE — 6370000000 HC RX 637 (ALT 250 FOR IP): Performed by: HOSPITALIST

## 2024-04-24 PROCEDURE — 2580000003 HC RX 258: Performed by: INTERNAL MEDICINE

## 2024-04-24 PROCEDURE — 85014 HEMATOCRIT: CPT

## 2024-04-24 PROCEDURE — 6360000004 HC RX CONTRAST MEDICATION: Performed by: INTERNAL MEDICINE

## 2024-04-24 PROCEDURE — 6360000002 HC RX W HCPCS: Performed by: INTERNAL MEDICINE

## 2024-04-24 PROCEDURE — 85025 COMPLETE CBC W/AUTO DIFF WBC: CPT

## 2024-04-24 PROCEDURE — 85610 PROTHROMBIN TIME: CPT

## 2024-04-24 PROCEDURE — APPNB30 APP NON BILLABLE TIME 0-30 MINS: Performed by: NURSE PRACTITIONER

## 2024-04-24 RX ORDER — SODIUM CHLORIDE 9 MG/ML
INJECTION, SOLUTION INTRAVENOUS PRN
Status: COMPLETED | OUTPATIENT
Start: 2024-04-24 | End: 2024-04-26

## 2024-04-24 RX ORDER — SODIUM BICARBONATE 650 MG/1
650 TABLET ORAL 4 TIMES DAILY
Status: DISCONTINUED | OUTPATIENT
Start: 2024-04-24 | End: 2024-04-26

## 2024-04-24 RX ORDER — FUROSEMIDE 10 MG/ML
20 INJECTION INTRAMUSCULAR; INTRAVENOUS ONCE
Status: COMPLETED | OUTPATIENT
Start: 2024-04-24 | End: 2024-04-24

## 2024-04-24 RX ORDER — SODIUM CHLORIDE 9 MG/ML
INJECTION, SOLUTION INTRAVENOUS
Status: DISPENSED
Start: 2024-04-24 | End: 2024-04-25

## 2024-04-24 RX ADMIN — METOPROLOL TARTRATE 25 MG: 25 TABLET, FILM COATED ORAL at 10:07

## 2024-04-24 RX ADMIN — METOPROLOL TARTRATE 25 MG: 25 TABLET, FILM COATED ORAL at 21:53

## 2024-04-24 RX ADMIN — SODIUM ZIRCONIUM CYCLOSILICATE 5 G: 5 POWDER, FOR SUSPENSION ORAL at 14:02

## 2024-04-24 RX ADMIN — SODIUM BICARBONATE 650 MG: 650 TABLET ORAL at 21:54

## 2024-04-24 RX ADMIN — PANTOPRAZOLE SODIUM 40 MG: 40 TABLET, DELAYED RELEASE ORAL at 05:20

## 2024-04-24 RX ADMIN — ATORVASTATIN CALCIUM 10 MG: 10 TABLET, FILM COATED ORAL at 10:06

## 2024-04-24 RX ADMIN — SODIUM CHLORIDE, PRESERVATIVE FREE 10 ML: 5 INJECTION INTRAVENOUS at 21:54

## 2024-04-24 RX ADMIN — SODIUM BICARBONATE 650 MG: 650 TABLET ORAL at 14:02

## 2024-04-24 RX ADMIN — SODIUM BICARBONATE 650 MG: 650 TABLET ORAL at 17:59

## 2024-04-24 RX ADMIN — SODIUM ZIRCONIUM CYCLOSILICATE 5 G: 5 POWDER, FOR SUSPENSION ORAL at 21:54

## 2024-04-24 RX ADMIN — SODIUM CHLORIDE: 9 INJECTION, SOLUTION INTRAVENOUS at 03:06

## 2024-04-24 RX ADMIN — DILTIAZEM HYDROCHLORIDE 120 MG: 120 CAPSULE, COATED, EXTENDED RELEASE ORAL at 10:06

## 2024-04-24 RX ADMIN — FUROSEMIDE 20 MG: 10 INJECTION, SOLUTION INTRAMUSCULAR; INTRAVENOUS at 17:59

## 2024-04-24 RX ADMIN — PERFLUTREN 1.5 ML: 6.52 INJECTION, SUSPENSION INTRAVENOUS at 11:21

## 2024-04-24 ASSESSMENT — PAIN SCALES - GENERAL
PAINLEVEL_OUTOF10: 0
PAINLEVEL_OUTOF10: 4
PAINLEVEL_OUTOF10: 0

## 2024-04-24 ASSESSMENT — PAIN DESCRIPTION - LOCATION: LOCATION: ABDOMEN

## 2024-04-24 ASSESSMENT — PAIN DESCRIPTION - ORIENTATION: ORIENTATION: LEFT;LOWER

## 2024-04-24 NOTE — PROGRESS NOTES
Physical Therapy  Lawrence Rodriguez   1950    1208: Attempted to see patient for PT session. Per RN, patient is currently on bedrest, awaiting the results of CT scan. Will follow up when patient is medically appropriate.     Diana Westfall PT, DPT [id number]

## 2024-04-24 NOTE — CONSULTS
[facility]   Nephrology progress Note  [phone]  [phone]   [employer]         Reason for Consult:  perinephric hematoma    HISTORY OF PRESENT ILLNESS:      The patient is a 74 y.o. male with significant past medical history of CKD, hypertension, CAD who presents with fall and perinephric hematoma. Per report, patient had a mechanical fall on left side and subsequent left flank pain. Noted to have perinephric hematoma with active extravasation. S/p IR guided embolization. Was on coumadin and aspirin for history of atrial fibrillation. Hemoglobin downtrending.   Creatinine at baseline seems be ~1.6 mg/dL, this as admission creatinine. Post procedure, this is now up to 2.7 >> 2.4 mg/dL this AM. Is non-oliguric. Does have persistent mild hyperkalemia with potassium in mid-5 range. Has not been seen by nephrologist previously.     Past Medical History:        Diagnosis Date    Arthritis     Atrial fibrillation (HCC)     CAD (coronary artery disease)     Cancer (HCC)     skin    Diabetes mellitus (HCC)     Hypercholesteremia     Hypertension     Obesity     Sleep apnea     does not use Cpap machine at present    Ulcer of pyloric antrum 2/2014    anemia/GI bleed while on NSAIDS    Wears glasses        Past Surgical History:        Procedure Laterality Date    ARM SURGERY Right 1/25/2022    RIGHT ULNAR NERVE DECOMPRESSION (AT ELBOW) performed by Yogi Murphy MD at [facility]    ARM SURGERY Left 3/21/2024    LEFT ULNAR NERVE DECOMPRESSION (AT ELBOW) performed by Yogi Murphy MD at [facility]    CARPAL TUNNEL RELEASE Right 1/25/2022    RIGHT CARPAL TUNNEL RELEASE performed by Yogi Murphy MD at [facility]    CARPAL TUNNEL RELEASE Left 3/21/2024    LEFT CARPAL TUNNEL RELEASE performed by Yogi Murphy MD at [facility]    COLONOSCOPY      COLONOSCOPY N/A 11/16/2023    COLONOSCOPY POLYPECTOMY SNARE/COLD BIOPSY performed by Bony Diggs MD at [facility]    CYST REMOVAL      from  to light  RESPIRATORY: Respiratory effort: normal. Auscultation: normal  CARDIOVASCULAR: Auscultation: regular. No JVD, Edema: 2+ in both legs. .   GASTROINTESTINAL: Soft, nontender, nondistended. Liver: normal in size  EXTREMITIES:  No cyanosis or clubbing.  SKIN: Warm and dry. No significant rashes    DATA:    RFP:   Recent Labs     04/23/24  1039 04/23/24  1749 04/24/24  0503   * 132* 132*   K 5.9* 5.3* 5.5*   CL 99 100 103   CO2 16* 18* 19*   BUN 36* 43* 41*   CREATININE 2.7* 2.7* 2.4*   CALCIUM 8.6 8.5 8.2*       Liver panel:  No results for input(s): \"ALB\", \"TP\", \"AST\", \"ALT\" in the last 72 hours.    Invalid input(s): \"TBIL\"    Endocrine:   @[id number](VitD,PTH,TSH,aldosterone,renin activity,cortisol,metanephrine)@    CBC:   Recent Labs     04/22/24  1106 04/22/24  2036 04/23/24  0509 04/23/24  1740 04/24/24  0503   WBC 16.6*  --  19.3*  --  15.6*   HGB 13.9   < > 12.0* 10.7* 8.9*   HCT 42.5   < > 37.7* 32.1* 27.6*   MCV 92.1  --  93.1  --  92.5     --  237  --  178    < > = values in this interval not displayed.       Iron Panel:   @[id number](FERA,FE)@    Serology: @IOMBBRAM63(ANAS,ANCA,C3,C4,RNP,AGBM,DNA,SSA,SSB,SPEP,UPEP,ESR,HEPT)@    Urine studies: @[id number](UAPR,UCOL,UNAR,UUNR,UCRR,UCLR,UKR,UUAR,UOSM,EOS)@      IMPRESSION/RECOMMENDATIONS:      Acute kidney injury - likely in setting of perinephric hematoma and hemodynamic instability/poor renal perfusion. Improving with stabilization and resuscitation.   - appears close to euvolemia. Will get PRBC later today as well. Will hold further IVF.   - holding jardiance, lasix, aldactone.     Hyperkalemia - mild. Likely poor renal potassium clearance in setting of BRITTANY. Give lokelma x2 days. Low potassium diet discussed    CKD IIIa - baseline creatinine ~1.6 mg/dL. Suspected 2/2 nephrosclerosis, diabetic nephropathy and senile decline. On jardiance outpatient. On hold currently due to BRITTANY and acute illness. Resume jardiance on discharge.   - CKD

## 2024-04-24 NOTE — CONSULTS
Urology Consult Note  [facility]     Patient: Lawrence Rodriguez MRN: [id number]  Room/Bed: [facility]-2902/2902-01   YOB: 1950  Age/Sex: 74 y.o.male  Admission Date: 4/22/2024     Date of Service:  4/24/2024    Consulting Provider: John W Frankel, PA-C CNP  Admitting/Requesting Physician: Blayne Loya MD  Primary Care Physician: Jose Brown MD    Reason for Consult: Decreasing Hgb with left perinephric hematoma    ASSESSMENT/PLAN     75yo male w/ hx of afib who was admitted for perinephric hematoma after a fall. He was on coumadin at the time of the fall. Had embolization with IR on 4/22. Patient showing decreased Hgb after procedure: 13.9 on 4/22 > 8.9 today. He denies any pain or urinary issues. Able to void on his own without GH seen. No CVA tenderness at this time. He does report some SOB when walking.  Afebrile, tachycardic, BP stable as of yesterday   Cr 2.4, WBC 15.6, Hgb 8.9  UA: High glucose, otherwise wnl      Recommendations:  Need to get H/H q6h  Transfuse as needed to ensure Hgb is stable    Monitor INR and ensure it is within normal range   Keep diet clear liquids in case emergent nephrectomy is needed  Will order CT ab/pelvis STAT without contrast to reassess hematoma   Will likely need repeat embolization with IR  Will follow along with you     All patient questions were answered. He understands the plan as listed above.    HISTORY     Chief Complaint:   Chief Complaint   Patient presents with    Fall     Fell this AM around 4AM walking a dog. Fell on left flank. Unable to get comfortable. Pain 7 or 8 out of 10. Denies SOB. Took 2 tylenol at home.        History of Present Illness: Lawrence Rodriguez is a 74 y.o. male with perinephric hematoma which occurred after fall. Had embolization with IR on 4/22 with hgb trending down since the procedure.     Past Medical History:  He has a past medical history of Arthritis, Atrial fibrillation (HCC), CAD (coronary artery  common femoral artery DSA was performed which demonstrated patency of the right common femoral artery, however a small arterial branch was adjacent to the access point of the vascular sheath.  Therefore, a vascular closure device was not used to avoid iatrogenic occlusion of this branch. The vascular sheath was removed and hemostasis was achieved with manual pressure at the groin site.  Right lower extremity pedal pulses were palpated post sheath removal. The patient tolerated the procedure well and there were no immediate complications. Estimated blood loss: Minimal (less than 15 mL) FINDINGS: Active extravasation of the left kidney arising from a segmental branch along the left lateral midpole.  Selective catheterization was technically challenging given the acute trajectory of the branching vessels leading towards the active bleeding. Post coil embolization of the bleeding vessel demonstrated no residual active bleeding along the midpole. Post embolization DSA demonstrated no additional foci of pseudoaneurysm formation or active extravasation.  A few small foci of contrast staining along the lower pole which were seen during selective catheterizations were no longer seen at the completion DSA.     Successful left renal angiogram which demonstrated active extravasation along the lateral midpole, supplied by a small caliber segmental branch of the left renal artery.  Selective catheterization of this bleeding vessel was technically challenging. Successful embolization of the feeding segmental branch supplying the active focus of extravasation using a single 2 mm x 4 cm endovascular coil.  Post embolization DSA demonstrated no evidence of residual active bleeding or pseudoaneurysm formations. Patient's family was updated immediately postprocedure.     CTA ABDOMEN PELVIS W CONTRAST    Result Date: 4/22/2024  EXAMINATION: CTA OF THE ABDOMEN AND PELVIS WITH CONTRAST 4/22/2024 1:31 pm: TECHNIQUE: CTA of the abdomen and

## 2024-04-24 NOTE — PROGRESS NOTES
Hospitalist Progress Note      PCP: Jose Brown MD    Date of Admission: 4/22/2024    LOS: 2    Chief Complaint:   Chief Complaint   Patient presents with    Fall     Fell this AM around 4AM walking a dog. Fell on left flank. Unable to get comfortable. Pain 7 or 8 out of 10. Denies SOB. Took 2 tylenol at home.        Case Summary:   74-year-old gentleman with history of chronic atrial fibrillation on anticoagulation, stage III CKD with baseline creatinine of 1.6, type 2 diabetes, hypertension, CAD, hyperlipidemia, obesity sleep apnea who presented following a mechanical fall while walking his dog sustaining a left perinephric hematoma with active extravasation status post successful IR embolization of the left renal segmental branch artery 4/22/2023      Active Hospital Problems    Diagnosis Date Noted    Coronary atherosclerosis of native coronary artery [I25.10] 08/24/2011     Priority: Medium    Essential hypertension [I10] 08/24/2011     Priority: Medium    BRITTANY (acute kidney injury) (HCC) [N17.9] 04/24/2024    Leucocytosis [D72.829] 04/24/2024    Acute blood loss anemia [D62] 04/24/2024    Hyperkalemia [E87.5] 04/24/2024    Fall against object [W18.00XA] 04/23/2024    Traumatic perinephric hematoma of left kidney [S37.092A] 04/22/2024    Chronic diastolic congestive heart failure (HCC) [I50.32] 04/06/2023    Chronic atrial fibrillation (HCC) [I48.20]          Principal Problem:    Traumatic perinephric hematoma of left kidney: Status post IR embolization of segmental branch vessels 4/22/2023.  Noted continued Hgb dropped down to 8.9 today from 13.9 on admission.  There is concern for possible further extravasation.  Patient denies any flank tenderness.  - Will give 1 unit PRBC  - Urology consult  - Monitor H&H      Shortness of breath: Patient reports shortness of breath with activity.  He has been receiving IV fluids for hydration.  I wonder if there may be an element of fluid overload.  Of

## 2024-04-24 NOTE — PROGRESS NOTES
Gu note    Rev case and ct with pa.  Trauma pt with left renal lac.  Sp emboliz yest.  Hgb lower today.   Creat 1.6    Recc  Need documented hourly vitals  Clears  Serial h/h  Bedrest  Correct coagulopathy(  inr still elev)  Stat ct  Transfuse to keep hgb above 8.5

## 2024-04-24 NOTE — CARE COORDINATION
Case Management Assessment  Initial Evaluation    Date/Time of Evaluation: 4/24/2024 3:14 PM   Assessment Completed by: FAISAL FONTANEZ RN    If patient is discharged prior to next notation, then this note serves as note for discharge by case management.    Patient Name: Lawrence Rodriguez                   YOB: 1950  Diagnosis: Retroperitoneal hemorrhage [R58]  Hematoma [T14.8XXA]  Anticoagulant long-term use [Z79.01]  Goals of care, counseling/discussion [Z71.89]  Rib pain on left side [R07.81]  Fall, initial encounter [W19.XXXA]  Hematoma of left kidney, initial encounter [S37.012A]                   Date / Time: 4/22/2024  7:26 AM    Patient Admission Status: Inpatient   Readmission Risk (Low < 19, Mod (19-27), High > 27): Readmission Risk Score: 15.8    Current PCP: Jose Brown MD  PCP verified by CM? Yes    Chart Reviewed: Yes      History Provided by: Patient  Patient Orientation: Alert and Oriented, Person, Place, Situation    Patient Cognition: Alert    Hospitalization in the last 30 days (Readmission):  No    If yes, Readmission Assessment in CM Navigator will be completed.    Advance Directives:      Code Status: Full Code   Patient's Primary Decision Maker is: Legal Next of Kin      Discharge Planning:    Patient lives with: Spouse/Significant Other Type of Home: House (2 story 2 steps to enter 13 to go upstairs)  Primary Care Giver: Self  Patient Support Systems include: Spouse/Significant Other, Children, Family Members   Current Financial resources: Medicare, Other (Comment) ([facility])  Current community resources: None  Current services prior to admission: None            Current DME:              Type of Home Care services:  None    ADLS  Prior functional level: Independent in ADLs/IADLs  Current functional level: Assistance with the following:, Other (see comment), Mobility (therapy recommending home care)    PT AM-PAC: 20 /24  OT AM-PAC: 19 /24    Family can provide assistance  at DC: Yes  Would you like Case Management to discuss the discharge plan with any other family members/significant others, and if so, who? Yes (son and spouse at bedside)  Plans to Return to Present Housing: Yes  Other Identified Issues/Barriers to RETURNING to current housing: none   Potential Assistance needed at discharge: Home Care            Potential DME:    Patient expects to discharge to: House  Plan for transportation at discharge: Family    Financial    Payor: MEDIGOLD / Plan: MEDIGOLD / Product Type: *No Product type* /     Does insurance require precert for SNF: Yes    Potential assistance Purchasing Medications: No  Meds-to-Beds request:        [id number] - [place], [place] - [address] [facility] -  [phone] - F [phone]  [address] [facility] [address]  Phone: [phone] Fax: [phone]    [facility] MAIL, NOW [facility] [address] [place] [address] - P [phone] - F [phone]  [address] [place] [address]  [address]  [facility] [address]  Phone: [phone] Fax: [phone]    [facility] [place], [place] - [address] [facility] -  [phone] - F [phone]  [address] [facility] [address]  Phone: [phone] Fax: [phone]      Notes:    Factors facilitating achievement of predicted outcomes: Family support, Cooperative, and Pleasant    Barriers to discharge: none identified at this time     Additional Case Management Notes:     CM met with pt and spouse and son and discussed home care services.     HC list provided along with CM phone number. CM encourage making choice today so a referral can be made in am. All verbalized understanding.     The Plan for Transition of Care is related to the following treatment goals of Retroperitoneal hemorrhage [R58]  Hematoma [T14.8XXA]  Anticoagulant long-term use [Z79.01]  Goals of care, counseling/discussion [Z71.89]  Rib pain on left side [R07.81]  Fall, initial encounter

## 2024-04-24 NOTE — CONSULTS
mesenteric arteries are patent.  There are single bilateral renal arteries which are patent. There is a large heterogeneous left perinephric hematoma with active contrast extravasation extending from the junction of the mid to lower pole.  On the delayed images, there was pooling of contrast and a cortical defect suggesting a laceration. The remainder of the solid organs are unremarkable.  There are 2 calcifications within the left kidney without evidence of urinary tract obstruction.  There is a 3 mm calcification in the right kidney without obstruction.  The gastrointestinal tract is within normal limits without evidence of obstruction.  The lung bases are unremarkable. CTA PELVIS: The iliac arteries are normal in caliber without focal stenosis or occlusion. There is no free fluid.  The urinary bladder is within normal limits.  There is multilevel lumbar spondylosis.     1. Large heterogeneous left perinephric hematoma with active contrast extravasation extending from the junction of the mid to lower pole. On the delayed images, there was pooling of contrast and a cortical defect suggesting a laceration. 2. Bilateral nephrolithiasis RECOMMENDATIONS: The findings were discussed with the interventional radiologist at 2:45 p.m. on 04/22/2024     CT C-Spine W/O Contrast    Result Date: 4/22/2024  EXAMINATION: CT OF THE CERVICAL SPINE WITHOUT CONTRAST 4/22/2024 8:28 am TECHNIQUE: CT of the cervical spine was performed without the administration of intravenous contrast. Multiplanar reformatted images are provided for review. Automated exposure control, iterative reconstruction, and/or weight based adjustment of the mA/kV was utilized to reduce the radiation dose to as low as reasonably achievable. COMPARISON: None. HISTORY: ORDERING SYSTEM PROVIDED HISTORY: fall on coumadin left side rib pain no neuro deficits TECHNOLOGIST PROVIDED HISTORY: Reason for exam:->fall on coumadin left side rib pain no neuro deficits Decision  visualized paranasal sinuses and mastoid air cells demonstrate no acute abnormality. SOFT TISSUES/SKULL:  No acute abnormality of the visualized skull or soft tissues.     No acute intracranial process.     CT CHEST WO CONTRAST    Result Date: 4/22/2024  EXAMINATION: CT OF THE CHEST WITHOUT CONTRAST 4/22/2024 8:29 am TECHNIQUE: CT of the chest was performed without the administration of intravenous contrast. Multiplanar reformatted images are provided for review. Automated exposure control, iterative reconstruction, and/or weight based adjustment of the mA/kV was utilized to reduce the radiation dose to as low as reasonably achievable. COMPARISON: 3 4 2023 HISTORY: ORDERING SYSTEM PROVIDED HISTORY: fall on coumadin left side rib pain no neuro deficits TECHNOLOGIST PROVIDED HISTORY: Reason for exam:->fall on coumadin left side rib pain no neuro deficits Decision Support Exception - unselect if not a suspected or confirmed emergency medical condition->Emergency Medical Condition (MA) Reason for Exam: fall FINDINGS: Mediastinum: Severe coronary artery calcification is seen.  No pericardial effusion.  No pericardial calcification noted.  Small mediastinal and hilar nodes are noted Lungs/pleura: Respiratory motion artifact limits evaluation of the lungs. Scattered areas of bronchial wall thickening are seen.  De pendant opacity is seen at the lung bases.  No pneumonia.  No edema.  No pleural effusion.  No pneumothorax.  Tiny fat containing diaphragmatic hernia seen on the left. Upper Abdomen: Subcapsular hematoma is seen involving the left kidney, measuring 5.4 cm in short axis as seen on image number 40 there is surrounding retroperitoneal hemorrhage.  Small calcification is seen in the left kidney measuring 6 mm in size.  Atherosclerotic change seen in abdominal aorta.  Tiny stone is seen in the right kidney measuring 3 mm Soft Tissues/Bones: Spurring is seen in the spine.  Spurring is seen in the shoulder joints.  No

## 2024-04-24 NOTE — PROGRESS NOTES
Occupational Therapy  Lawrence Rodriguez      1245: Attempted to see patient for OT session. Per RN, patient is currently on bedrest, awaiting the results of CT scan. Will follow up when patient is medically appropriate.     Malu Quispe, OTR/L, [id number] 4/24/2024 3:26 PM

## 2024-04-25 ENCOUNTER — PATIENT MESSAGE (OUTPATIENT)
Dept: CARDIOLOGY CLINIC | Age: 74
End: 2024-04-25

## 2024-04-25 ENCOUNTER — APPOINTMENT (OUTPATIENT)
Dept: GENERAL RADIOLOGY | Age: 74
End: 2024-04-25
Payer: COMMERCIAL

## 2024-04-25 PROBLEM — I95.1 ORTHOSTATIC HYPOTENSION: Status: ACTIVE | Noted: 2024-04-25

## 2024-04-25 PROBLEM — S37.012A RENAL HEMATOMA, LEFT: Status: ACTIVE | Noted: 2024-04-25

## 2024-04-25 LAB
ANION GAP SERPL CALCULATED.3IONS-SCNC: 11 MMOL/L (ref 3–16)
BASOPHILS # BLD: 0.1 K/UL (ref 0–0.2)
BASOPHILS NFR BLD: 0.4 %
BUN SERPL-MCNC: 31 MG/DL (ref 7–20)
CALCIUM SERPL-MCNC: 8.3 MG/DL (ref 8.3–10.6)
CHLORIDE SERPL-SCNC: 105 MMOL/L (ref 99–110)
CO2 SERPL-SCNC: 20 MMOL/L (ref 21–32)
CREAT SERPL-MCNC: 1.6 MG/DL (ref 0.8–1.3)
DEPRECATED RDW RBC AUTO: 14.7 % (ref 12.4–15.4)
EOSINOPHIL # BLD: 0 K/UL (ref 0–0.6)
EOSINOPHIL NFR BLD: 0.3 %
FERRITIN SERPL IA-MCNC: 261.3 NG/ML (ref 30–400)
GFR SERPLBLD CREATININE-BSD FMLA CKD-EPI: 45 ML/MIN/{1.73_M2}
GLUCOSE BLD-MCNC: 128 MG/DL (ref 70–99)
GLUCOSE BLD-MCNC: 129 MG/DL (ref 70–99)
GLUCOSE BLD-MCNC: 94 MG/DL (ref 70–99)
GLUCOSE BLD-MCNC: 98 MG/DL (ref 70–99)
GLUCOSE SERPL-MCNC: 95 MG/DL (ref 70–99)
HCT VFR BLD AUTO: 27.4 % (ref 40.5–52.5)
HCT VFR BLD AUTO: 30.1 % (ref 40.5–52.5)
HGB BLD-MCNC: 10.1 G/DL (ref 13.5–17.5)
HGB BLD-MCNC: 9.4 G/DL (ref 13.5–17.5)
INR PPP: 1.51 (ref 0.85–1.15)
IRON SATN MFR SERPL: 14 % (ref 20–50)
IRON SERPL-MCNC: 31 UG/DL (ref 59–158)
LYMPHOCYTES # BLD: 0.8 K/UL (ref 1–5.1)
LYMPHOCYTES NFR BLD: 6.3 %
MCH RBC QN AUTO: 31.2 PG (ref 26–34)
MCHC RBC AUTO-ENTMCNC: 34.3 G/DL (ref 31–36)
MCV RBC AUTO: 90.8 FL (ref 80–100)
MONOCYTES # BLD: 1.3 K/UL (ref 0–1.3)
MONOCYTES NFR BLD: 10.3 %
NEUTROPHILS # BLD: 10.1 K/UL (ref 1.7–7.7)
NEUTROPHILS NFR BLD: 82.7 %
PERFORMED ON: ABNORMAL
PERFORMED ON: ABNORMAL
PERFORMED ON: NORMAL
PERFORMED ON: NORMAL
PLATELET # BLD AUTO: 159 K/UL (ref 135–450)
PMV BLD AUTO: 7.8 FL (ref 5–10.5)
POTASSIUM SERPL-SCNC: 4.7 MMOL/L (ref 3.5–5.1)
PROTHROMBIN TIME: 18.3 SEC (ref 11.9–14.9)
RBC # BLD AUTO: 3.01 M/UL (ref 4.2–5.9)
SODIUM SERPL-SCNC: 136 MMOL/L (ref 136–145)
TIBC SERPL-MCNC: 221 UG/DL (ref 260–445)
WBC # BLD AUTO: 12.2 K/UL (ref 4–11)

## 2024-04-25 PROCEDURE — 82728 ASSAY OF FERRITIN: CPT

## 2024-04-25 PROCEDURE — 97530 THERAPEUTIC ACTIVITIES: CPT

## 2024-04-25 PROCEDURE — 80048 BASIC METABOLIC PNL TOTAL CA: CPT

## 2024-04-25 PROCEDURE — 83540 ASSAY OF IRON: CPT

## 2024-04-25 PROCEDURE — 99223 1ST HOSP IP/OBS HIGH 75: CPT | Performed by: INTERNAL MEDICINE

## 2024-04-25 PROCEDURE — 71045 X-RAY EXAM CHEST 1 VIEW: CPT

## 2024-04-25 PROCEDURE — 85014 HEMATOCRIT: CPT

## 2024-04-25 PROCEDURE — 85018 HEMOGLOBIN: CPT

## 2024-04-25 PROCEDURE — 97535 SELF CARE MNGMENT TRAINING: CPT

## 2024-04-25 PROCEDURE — 85610 PROTHROMBIN TIME: CPT

## 2024-04-25 PROCEDURE — 83550 IRON BINDING TEST: CPT

## 2024-04-25 PROCEDURE — 2060000000 HC ICU INTERMEDIATE R&B

## 2024-04-25 PROCEDURE — 2580000003 HC RX 258: Performed by: INTERNAL MEDICINE

## 2024-04-25 PROCEDURE — 6370000000 HC RX 637 (ALT 250 FOR IP): Performed by: HOSPITALIST

## 2024-04-25 PROCEDURE — 36415 COLL VENOUS BLD VENIPUNCTURE: CPT

## 2024-04-25 PROCEDURE — 85025 COMPLETE CBC W/AUTO DIFF WBC: CPT

## 2024-04-25 PROCEDURE — 6370000000 HC RX 637 (ALT 250 FOR IP): Performed by: INTERNAL MEDICINE

## 2024-04-25 PROCEDURE — 97116 GAIT TRAINING THERAPY: CPT

## 2024-04-25 RX ADMIN — SODIUM BICARBONATE 650 MG: 650 TABLET ORAL at 20:37

## 2024-04-25 RX ADMIN — SODIUM ZIRCONIUM CYCLOSILICATE 5 G: 5 POWDER, FOR SUSPENSION ORAL at 08:08

## 2024-04-25 RX ADMIN — METOPROLOL TARTRATE 25 MG: 25 TABLET, FILM COATED ORAL at 08:07

## 2024-04-25 RX ADMIN — SODIUM BICARBONATE 650 MG: 650 TABLET ORAL at 08:07

## 2024-04-25 RX ADMIN — POLYETHYLENE GLYCOL 3350 17 G: 17 POWDER, FOR SOLUTION ORAL at 19:44

## 2024-04-25 RX ADMIN — ATORVASTATIN CALCIUM 10 MG: 10 TABLET, FILM COATED ORAL at 08:07

## 2024-04-25 RX ADMIN — PANTOPRAZOLE SODIUM 40 MG: 40 TABLET, DELAYED RELEASE ORAL at 05:50

## 2024-04-25 RX ADMIN — SODIUM BICARBONATE 650 MG: 650 TABLET ORAL at 17:09

## 2024-04-25 RX ADMIN — DILTIAZEM HYDROCHLORIDE 120 MG: 120 CAPSULE, COATED, EXTENDED RELEASE ORAL at 08:07

## 2024-04-25 RX ADMIN — SODIUM CHLORIDE, PRESERVATIVE FREE 10 ML: 5 INJECTION INTRAVENOUS at 08:08

## 2024-04-25 RX ADMIN — SODIUM BICARBONATE 650 MG: 650 TABLET ORAL at 13:50

## 2024-04-25 RX ADMIN — METOPROLOL TARTRATE 25 MG: 25 TABLET, FILM COATED ORAL at 20:37

## 2024-04-25 RX ADMIN — SODIUM CHLORIDE, PRESERVATIVE FREE 10 ML: 5 INJECTION INTRAVENOUS at 20:37

## 2024-04-25 RX ADMIN — SODIUM ZIRCONIUM CYCLOSILICATE 5 G: 5 POWDER, FOR SUSPENSION ORAL at 13:50

## 2024-04-25 ASSESSMENT — PAIN SCALES - GENERAL
PAINLEVEL_OUTOF10: 0

## 2024-04-25 NOTE — PROGRESS NOTES
[facility] - Inpatient Rehabilitation Department   Phone: [phone]    Occupational Therapy    [] Initial Evaluation            [x] Daily Treatment Note         [] Discharge Summary      Patient: Lawrence Rodriguez   : 1950   MRN: [id number]   Date of Service:  2024    Admitting Diagnosis:  Traumatic perinephric hematoma of left kidney  Current Admission Summary: 74-year-old gentleman with history of chronic atrial fibrillation on anticoagulation, stage III CKD with baseline creatinine of 1.6, type 2 diabetes, hypertension, CAD, hyperlipidemia, obesity sleep apnea who presented following a mechanical fall while walking his dog sustaining a left perinephric hematoma with active extravasation status post successful IR embolization of the left renal segmental branch artery 2023     Past Medical History:  has a past medical history of Arthritis, Atrial fibrillation (HCC), CAD (coronary artery disease), Cancer (HCC), Diabetes mellitus (HCC), Hypercholesteremia, Hypertension, Obesity, Sleep apnea, Ulcer of pyloric antrum, and Wears glasses.  Past Surgical History:  has a past surgical history that includes Diagnostic Cardiac Cath Lab Procedure (06); knee surgery; Colonoscopy; Upper gastrointestinal endoscopy (14); cyst removal; Upper gastrointestinal endoscopy (2018); Arm Surgery (Right, 2022); Carpal tunnel release (Right, 2022); Colonoscopy (N/A, 2023); Arm Surgery (Left, 3/21/2024); Carpal tunnel release (Left, 3/21/2024); and IR EMBOLIZATION HEMORRHAGE (2024).    Discharge Recommendations: Lawrence Rodriguez scored a 21/24 on the AM-PAC ADL Inpatient form. Current research shows that an AM-PAC score of 18 or greater is typically associated with a discharge to the patient's home setting. Based on the patient's AM-PAC score, and their current ADL deficits, it is recommended that the patient have 2-3 sessions per week of Occupational Therapy at d/c to

## 2024-04-25 NOTE — PROGRESS NOTES
[facility]   Nephrology progress Note  [phone]   [employer]         Reason for Consult:  perinephric hematoma    HISTORY OF PRESENT ILLNESS:      The patient is a 74 y.o. male with significant past medical history of CKD, hypertension, CAD who presents with fall and perinephric hematoma. Per report, patient had a mechanical fall on left side and subsequent left flank pain. Noted to have perinephric hematoma with active extravasation. S/p IR guided embolization. Was on coumadin and aspirin for history of atrial fibrillation. Hemoglobin downtrending.   Creatinine at baseline seems be ~1.6 mg/dL, this as admission creatinine. Post procedure, this is now up to 2.7 >> 2.4 mg/dL this AM. Is non-oliguric. Does have persistent mild hyperkalemia with potassium in mid-5 range. Has not been seen by nephrologist previously.     Interval history:  Multiple family members at bedside.  ROS - denies shortness of breath. Did get hypotensive with mobility.     Current Medications:    Scheduled Meds:   sodium zirconium cyclosilicate  5 g Oral TID    sodium bicarbonate  650 mg Oral 4x Daily    atorvastatin  10 mg Oral Daily    metoprolol tartrate  25 mg Oral BID    pantoprazole  40 mg Oral QAM AC    sodium chloride flush  5-40 mL IntraVENous 2 times per day    insulin lispro  0-8 Units SubCUTAneous TID WC    insulin lispro  0-4 Units SubCUTAneous Nightly    dilTIAZem  120 mg Oral Daily     Continuous Infusions:   sodium chloride      sodium chloride 75 mL/hr at 04/23/24 0544     PRN Meds:.sodium chloride, sodium chloride flush, sodium chloride, polyethylene glycol, acetaminophen **OR** acetaminophen, morphine, ondansetron     PHYSICAL EXAM:    Vitals:    /75   Pulse 80   Temp 98.8 °F (37.1 °C) (Temporal)   Resp 18   Ht 1.803 m (5' 11\")   Wt 121 kg (266 lb 12.1 oz)   SpO2 99%   BMI 37.21 kg/m²     Intake/Output Summary (Last 24 hours) at 4/25/2024 1047  Last data filed at  maintain above 7.0 g/dL hemoglobin as stabilized.     Hyperkalemia - mild. Likely poor renal potassium clearance in setting of BRITTANY. Now improved. Stop lokelma after today.     CKD IIIa - baseline creatinine ~1.6 mg/dL. Suspected 2/2 nephrosclerosis, diabetic nephropathy and senile decline. On jardiance outpatient. On hold currently due to BRITTANY and acute illness. Resume jardiance on discharge.   - CKD work up once acute issues resolve.     Metabolic acidosis - likely in setting of BRITTANY. Start po bicarb    History of hypertension - BP normal at rest. Continue diltiazem and metoprolol. Hold aldactone. Still orthostatic, but this seems to be improvin gas well.    Anemia - acute blood loss. Monitor closely. Transfuse to maintain above 7.0 g/dL    Continue inpatient monitoring    Thank you for allowing me to participate in the care of this patient. Please contact via [employer] if any questions or concerns.       Janes Biggs DO  4/25/2024

## 2024-04-25 NOTE — DISCHARGE INSTR - COC
Continuity of Care Form    Patient Name: Lawrence Rodriguez   :  1950  MRN:  [id number]    Admit date:  2024  Discharge date:  ***    Code Status Order: Full Code   Advance Directives:     Admitting Physician:  Blayne Loya MD  PCP: Jose Brown MD    Discharging Nurse: ***  Discharging Hospital Unit/Room#: B1N-2350/5907-01  Discharging Unit Phone Number: ***    Emergency Contact:   Extended Emergency Contact Information  Primary Emergency Contact: Yara Rodriguez  Address: [address] [place] [address]  Home Phone: [phone]  Relation: Spouse  Secondary Emergency Contact: Ramesh Rodriguez   [facility]  Home Phone: [phone]  Relation: Child    Past Surgical History:  Past Surgical History:   Procedure Laterality Date    ARM SURGERY Right 2022    RIGHT ULNAR NERVE DECOMPRESSION (AT ELBOW) performed by Yogi Murphy MD at [facility] OR    ARM SURGERY Left 3/21/2024    LEFT ULNAR NERVE DECOMPRESSION (AT ELBOW) performed by Yogi Murphy MD at [facility] OR    CARPAL TUNNEL RELEASE Right 2022    RIGHT CARPAL TUNNEL RELEASE performed by Yogi Murphy MD at [facility] OR    CARPAL TUNNEL RELEASE Left 3/21/2024    LEFT CARPAL TUNNEL RELEASE performed by Yogi Murphy MD at [facility] OR    COLONOSCOPY      COLONOSCOPY N/A 2023    COLONOSCOPY POLYPECTOMY SNARE/COLD BIOPSY performed by Bony Diggs MD at [facility] END[facility]COPY    CYST REMOVAL      from back side--fatty lipoma    DIAGNOSTIC CARDIAC CATH LAB PROCEDURE  06    IR EMBOLIZATION HEMORRHAGE  2024    IR EMBOLIZATION HEMORRHAGE 2024 Slava Serrato,  [facility] PROCEDURES    KNEE SURGERY      arthroscopy bilateral    UPPER GASTROINTESTINAL ENDOSCOPY  14    EGD/Colonoscopy with biopsy of ulcer    UPPER GASTROINTESTINAL ENDOSCOPY  2018    with bx       Immunization History:   Immunization History   Administered Date(s) Administered    COVID-19, MODERNA BLUE  border, Primary or Immunocompromised, (age 12y+), IM, 100 mcg/0.5mL 2021, 2021    Influenza Virus Vaccine 2013    TDaP, ADACEL (age 10y-64y), BOOSTRIX (age 10y+), IM, 0.5mL 10/26/2017       Active Problems:  Patient Active Problem List   Diagnosis Code    Essential hypertension I10    Coronary atherosclerosis of native coronary artery I25.10    Sleep apnea G47.30    Anticoagulant long-term use Z79.01    Anemia D64.9    GI bleed K92.2    Pain in joint, lower leg M25.569    Primary osteoarthritis of right knee M17.11    Chondromalacia of right patellofemoral joint M22.41    Status post arthroscopic surgery of right knee chondroplasty, synovectomy, partial medial meniscectomy on 2006 (3A patellofemoral and medial compartment) Z98.890    Morbid obesity with BMI of 40.0-44.9, adult ([facility]) E66.01, Z68.41    Chronic pain of right knee M25.561, G89.29    Upper GI bleed K92.2    Supratherapeutic INR R79.1    Chronic atrial fibrillation ([facility]) I48.20    Pure hypercholesterolemia E78.00    Diabetes mellitus ([facility]) E11.9    Cellulitis L03.90    Bilateral carpal tunnel syndrome G56.03    Cubital tunnel syndrome, bilateral G56.23    Carpal tunnel syndrome G56.00    MCGUIRE (dyspnea on exertion) R06.09    Chronic diastolic heart failure ([facility]) I50.32    Cubital tunnel syndrome on left G56.22    Left carpal tunnel syndrome G56.02    Traumatic perinephric hematoma of left kidney S37.092A    Fall W19.XXXA    BRITTANY (acute kidney injury) ([facility]) N17.9    Leucocytosis D72.829    Acute blood loss anemia D62    Hyperkalemia E87.5    Orthostatic hypotension I95.1    Renal hematoma, left S37.012A       Isolation/Infection:   Isolation            No Isolation          Patient Infection Status       Infection Onset Added Last Indicated Last Indicated By Review Planned Expiration Resolved Resolved By    None active    Resolved    COVID-19 11/10/20 11/12/20 11/10/20 COVID-19   20 Infection     MRSA 07/08/19 07/10/19

## 2024-04-25 NOTE — TELEPHONE ENCOUNTER
From: Lawrence Rodriguez  To: Dr. Marcia Stokes  Sent: 4/25/2024 10:17 AM EDT  Subject: orthopedic hypotension and hospital visit    Dr. Stokes,     After a visit to the hospital from a another recent fall. The nurses were testing my orthopedic hypotension seems to be a consistent was a problem.     130/87 (100) - Laying  123/74 (89) - Sitting  137/112 (121) - Standing  128/38 (63) - After a brief walk    The question I have is there something underlying causing this issue and/or is it something that indicates a need to adjust my prescriptions.

## 2024-04-25 NOTE — PROGRESS NOTES
Both son's at bedside. Orthostatic Bps as follows per OT.     Supine 130/87 HR 87  Sitting 123/74 HR 76  Standing 137/112 HR 91  Ambulation 128/38   Recover 127/57 HR 90    Pt c/o lightheadedness with position changes and ambulation.    Son Ramesh concerned that patient has been having multiple falls within the past few months that he knows about. Assuming that there has been more. Pt declining to respond.

## 2024-04-25 NOTE — PROGRESS NOTES
Urology Progress Note  [facility]    Provider: SHAMAR Partida CNP  Patient ID:  Admission Date: 2024 Name: Lawrence Rodriguez  OR Date: 2024 MRN: [id number]   Patient Location: [id number]-[id number]/5907-01 : 1950  Attending: Clint Laguna MD Date of Service: 2024  PCP: Jose Brown MD     Diagnoses:  1. Fall, initial encounter    2. Rib pain on left side    3. Anticoagulant long-term use    4. Hematoma of left kidney, initial encounter    5. Retroperitoneal hemorrhage    6. Goals of care, counseling/discussion         Assessment/Plan:  73yo male w/ hx of afib who was admitted for perinephric hematoma after a fall. He was on coumadin at the time of the fall. Had embolization with IR on . Patient showing decreased Hgb after procedure: 13.9 on  > 8.9 today. He denies any pain or urinary issues. Able to void on his own without GH seen. No CVA tenderness at this time. He does report some SOB when walking.  Afebrile, tachycardic, BP stable as of yesterday   Cr 2.4, WBC 15.6, Hgb 8.9  UA: High glucose, otherwise wnl     ====  Hgb stable  AFVSS       Recommendations:  Continue to monitor h/h  Patient appears hemodynamically stable  Re-embolization would be the plan if patient were to deteriorate  Will follow    The patient had a chance to ask questions which were answered. he understands the above plan.    Subjective:   Lawrence Rodriguez is a 74 y.o. male. He was seen and examined this morning. Today we discussed stable labs and vitals.      Objective:   Vitals:  Vitals:    24 0811   BP: 131/75   Pulse: 80   Resp: 18   Temp: 98.8 °F (37.1 °C)   SpO2: 99%       Intake/Output Summary (Last 24 hours) at 2024 0817  Last data filed at 2024 2153  Gross per 24 hour   Intake 746.67 ml   Output 1300 ml   Net -553.33 ml     Physical Exam:  Gen: Alert and oriented x3, no acute distress  CV: Regular rate   Resp: unlabored respirations  Abd: Soft, non-distended,

## 2024-04-25 NOTE — PROGRESS NOTES
[facility] - Inpatient Rehabilitation Department   Phone: [phone]    Physical Therapy    [] Initial Evaluation            [] Daily Treatment Note         [] Discharge Summary      Patient: Lawrence Rodriguez   : 1950   MRN: [id number]   Date of Service:  2024  Admitting Diagnosis: Traumatic perinephric hematoma of left kidney  Current Admission Summary: Lawrence Rodriguez is a 74-year-old with history of chronic atrial fibrillation on anticoagulation, stage III CKD with baseline creatinine of 1.6, type 2 diabetes, hypertension, CAD, hyperlipidemia, obesity sleep apnea who presented following a mechanical fall while walking his dog sustaining a left perinephric hematoma with active extravasation status post successful IR embolization of the left renal segmental branch artery 2023   Past Medical History:  has a past medical history of Arthritis, Atrial fibrillation (HCC), CAD (coronary artery disease), Cancer (HCC), Diabetes mellitus (HCC), Hypercholesteremia, Hypertension, Obesity, Sleep apnea, Ulcer of pyloric antrum, and Wears glasses.  Past Surgical History:  has a past surgical history that includes Diagnostic Cardiac Cath Lab Procedure (06); knee surgery; Colonoscopy; Upper gastrointestinal endoscopy (14); cyst removal; Upper gastrointestinal endoscopy (2018); Arm Surgery (Right, 2022); Carpal tunnel release (Right, 2022); Colonoscopy (N/A, 2023); Arm Surgery (Left, 3/21/2024); Carpal tunnel release (Left, 3/21/2024); and IR EMBOLIZATION HEMORRHAGE (2024).  Discharge Recommendations: Lawrence Rodriguez scored a 20/24 on the AM-PAC short mobility form. Current research shows that an AM-PAC score of 18 or greater is typically associated with a discharge to the patient's home setting. Based on the patient's AM-PAC score and their current functional mobility deficits, it is recommended that the patient have 2-3 sessions per week of Physical Therapy at  d/c to increase the patient's independence.  At this time, this patient demonstrates the endurance and safety to discharge home with home health PT and a follow up treatment frequency of 2-3x/wk.  Please see assessment section for further patient specific details. If patient discharges prior to next session this note will serve as a discharge summary.  Please see below for the latest assessment towards goals.       HOME HEALTH CARE: LEVEL 1 STANDARD    - Initial home health evaluation to occur within 24-48 hours, in patient home   - Therapy to evaluate with goal of regaining prior level of functioning   - Therapy to evaluate if patient has Home Health Aide needs for personal care    DME Required For Discharge: patient has all required DME for discharge  Precautions/Restrictions: high fall risk (no alarm set upon arrival)  Weight Bearing Restrictions: no restrictions  [] Right Upper Extremity  [] Left Upper Extremity [] Right Lower Extremity  [] Left Lower Extremity     Required Braces/Orthotics: no braces required   [] Right  [] Left  Positional Restrictions:no positional restrictions    Pre-Admission Information   Lives With: spouse    Type of Home: house  Home Layout: two level, bedroom/bathroom upstairs, 13 stairs to 2nd level with R HR  Home Access:  2 step to enter without rails   Bathroom Layout: walk in shower  Bathroom Equipment:  no prior equipment   Toilet Height: standard height, elevated height  Home Equipment: rolling walker  Transfer Assistance: Independent without use of device  Ambulation Assistance:Independent without use of device  ADL Assistance: independent with all ADL's  IADL Assistance: independent with homemaking tasks, shares with wife  Active :        [x] Yes  [] No  Hand Dominance: [] Left  [x] Right  Current Employment: retired.  Occupation: [employer]   Hobbies: Watch movies, sports, gambling   Recent Falls: Admitted for a fall. Reports 3 additional falls within the last 6

## 2024-04-25 NOTE — CONSULTS
[facility]  Advanced CHF/Pulmonary Hypertension   Cardiac Evaluation      Lawrence Rodriguez  YOB: 1950    Requesting Physician:  Dr. Laguna      Chief Complaint   Patient presents with    Fall     Fell this AM around 4AM walking a dog. Fell on left flank. Unable to get comfortable. Pain 7 or 8 out of 10. Denies SOB. Took 2 tylenol at home.         History of Present Illness:  Lawrence Rodriguez is a 75 yo male who got up in the middle of the night to go to the bathroom and fell on his way back to bed.  He fell on his left side, did not hit his head but his elbow hit his flank.  He did not lose consciousness.  He had another fall taking his dog out.  He is on warfarin for atrial fib.      He has a history of PHTN, non-obstructive CAD, HTN, HLD, LUZ MARINA, & chronic atrial fibrillation diagnosed in 2004 treated with Coumadin.    He had his sleep apnea testing, and it was severely positive.  He doesn't use cpap because his machine was recalled.       I just saw him a few weeks ago.  He had lost 30 pounds on ozempic.  He continues on 3 meds for diabetes, but does not check blood glucose levels at home.  At that office visit, we stopped the lasix due to him being on Jardiance and Ozempic.  He is also on glimiperide.       Family was concerned that he was dropping his diastolic BP with activity.  He has not complained of dizziness since he has been here or with walking with PT.  He does feel weak after the bleeding.  We discussed the possibility of his blood glucose levels being too low on 3 drugs since he has lost 30 pounds.  In the hospital he has not received those meds, so his glucose levels are not representative of what happens at home.  I am consulted for med management of his CHF meds.  He is not having edema.  Shortness of breath is stable.      Labs:  Sodium 136  K 4.7  BUN/Cre 31/1.6 (creatinine increased to 2.7 with dye)  H/H 10.1/30.1      CT abdomen  IMPRESSION:  Stable large left perinephric  discharge for core strengthening  Continue metoprolol, diltiazem, spironolactone, and warfarin for CHF and afib  I will arrange for him to be seen in the office in 1-2 weeks by our team.    The patient was seen for > 75 minutes. I reviewed interval history, physical exam, review of data including labs, imaging, development and implementation of treatment plan and coordination of complex care. More than 50% of the time was devoted to counseling the patient on their diagnoses/treatments, as well as coordination of care with the other care teams      I appreciate the opportunity of cooperating in the care of this patient.    Marcia Stokes M.D., [facility]

## 2024-04-25 NOTE — PROGRESS NOTES
Physical Therapy  Lawrence Rodriguez    Attempted to see pt for PT treatment. Pt declined therapy at this time due to fatigue, requesting therapy to return later this date. Would like to perform stairs. Will follow up with pt later in pm as schedule permits. Thanks, Shiloh Donahue, PT, DPT [id number]

## 2024-04-25 NOTE — PROGRESS NOTES
Patient reports dizziness on ambulation.  Orthostatics were positive initially a couple of days ago.  He received fluid with improvement of orthostatics.  Repeat orthostatics with no significant pressure drop.  Today it was noted that she had diastolic drop with activity but systolics were stable.  As patient is still with shortness of breath, will avoid IV hydration at this time.  He may benefit from midodrine however there is no significant BP drop.  - Patient and family had keen to discuss with cardiology and consult placed      Shortness of breath: Stiffly stable.  Remains on room air.  Echocardiogram 4/24/2024 with preserved EF and no wall motion abnormality  - Get portable chest x-ray to rule out fluid overload      Acute kidney injury: Patient on admission had creatinine of 1.6 and peaked at 2.7.  Received IV hydration and reporting shortness of breath.  Creatinine down to 2.4 today.  - Patient may need contrast to assess further perinephric hemorrhage.  Will consult with nephrology  - Monitor renal function and lactic      Leukocytosis: Unclear etiology.  Likely reactionary with underlying hematoma.  Trending down.  Will monitor.  No evidence of active infection.        Dizziness: Still with dizziness though improved.  Received fluid boluses yesterday.  BP improved.  Noted for mild tachycardia.  Will give metoprolol and continue to hold on diltiazem.  Monitor BP      Active Problems:    Essential hypertension: Improved blood pressure.  Noted mild tachycardia.  Continue metoprolol and keep holding and diltiazem.    Coronary atherosclerosis of native coronary artery: No chest pains or shortness of breath.  Antiplatelet therapy and anticoagulation on hold due to perinephric hemorrhage.  Will monitor    Chronic atrial fibrillation (HCC): Anticoagulation on hold due to bleed.  Will hold off metoprolol due to symptomatic hypotension this morning    Chronic diastolic congestive heart failure (HCC): Remains  euvolemic.  Will monitor.  Blood pressure soft this morning.    Fall against object: Get PT OT evaluation in view of discharge planning        Medications:  Reviewed  Infusion Medications    sodium chloride      sodium chloride 75 mL/hr at 04/23/24 0544     Scheduled Medications    sodium zirconium cyclosilicate  5 g Oral TID    sodium bicarbonate  650 mg Oral 4x Daily    atorvastatin  10 mg Oral Daily    metoprolol tartrate  25 mg Oral BID    pantoprazole  40 mg Oral QAM AC    sodium chloride flush  5-40 mL IntraVENous 2 times per day    insulin lispro  0-8 Units SubCUTAneous TID WC    insulin lispro  0-4 Units SubCUTAneous Nightly    [Held by provider] dilTIAZem  120 mg Oral Daily     PRN Meds: sodium chloride, sodium chloride flush, sodium chloride, polyethylene glycol, acetaminophen **OR** acetaminophen, morphine, ondansetron      DVT Prophylaxis: SCDs due to perinephric  Diet: ADULT DIET; Regular; 5 carb choices (75 gm/meal); Low Potassium (Less than 3000 mg/day)  Code Status: Full Code    Pt has a high probability of imminent or life-threatening deterioration requiring close monitoring, and highly complex decision-making and/or interventions of high intensity to assess, manipulate, and support his critical organ systems to prevent a likely inevitable decline which could occur if left untreated.  33 minutes of critical care time spent.      ____________________________________________________________________________    Subjective:   Overnight Events:   Uneventful overnight  Complain of dizziness on ambulation  No significant systolic orthostatic drop except diastolic of unclear significance        Physical Exam:  BP (!) 127/57   Pulse 73   Temp 98.8 °F (37.1 °C) (Temporal)   Resp 18   Ht 1.803 m (5' 11\")   Wt 121 kg (266 lb 12.1 oz)   SpO2 98%   BMI 37.21 kg/m²   General appearance: No apparent distress, appears stated age and cooperative.  HEENT: Normocephalic, atraumatic, MMM, No sclera

## 2024-04-25 NOTE — CARE COORDINATION
04/25/24 1555   IMM Letter   IMM Letter given to Patient/Family/Significant other/Guardian/POA/by: MOE Amaro   IMM Letter date given: 04/25/24   IMM Letter time given: 1551     Chart reviewed.  Patient is from home with his spouse. He has a rolling walker.    This CM met with patient to discuss PT/OT recommendations of HHC. He is agreeable and asked that I called his son, Ramesh to discuss company choices.  I spoke with Ramesh and he would like referral sent to [facility].    I spoke with Alma at [facility] who stated they could ACCEPT patient. HHC orders were placed.  Will need JESSICA signed by MD at discharge.    CM team will continue to follow.  Katelin Kilpatrick RN Case Manager  [phone]

## 2024-04-26 VITALS
WEIGHT: 263 LBS | BODY MASS INDEX: 36.82 KG/M2 | HEIGHT: 71 IN | TEMPERATURE: 96.6 F | HEART RATE: 85 BPM | DIASTOLIC BLOOD PRESSURE: 66 MMHG | SYSTOLIC BLOOD PRESSURE: 112 MMHG | OXYGEN SATURATION: 96 % | RESPIRATION RATE: 16 BRPM

## 2024-04-26 LAB
ANION GAP SERPL CALCULATED.3IONS-SCNC: 9 MMOL/L (ref 3–16)
BASOPHILS # BLD: 0 K/UL (ref 0–0.2)
BASOPHILS NFR BLD: 0.4 %
BUN SERPL-MCNC: 25 MG/DL (ref 7–20)
CALCIUM SERPL-MCNC: 8.7 MG/DL (ref 8.3–10.6)
CHLORIDE SERPL-SCNC: 101 MMOL/L (ref 99–110)
CO2 SERPL-SCNC: 26 MMOL/L (ref 21–32)
CREAT SERPL-MCNC: 1.4 MG/DL (ref 0.8–1.3)
DEPRECATED RDW RBC AUTO: 14.7 % (ref 12.4–15.4)
EOSINOPHIL # BLD: 0.1 K/UL (ref 0–0.6)
EOSINOPHIL NFR BLD: 0.8 %
GFR SERPLBLD CREATININE-BSD FMLA CKD-EPI: 53 ML/MIN/{1.73_M2}
GLUCOSE SERPL-MCNC: 91 MG/DL (ref 70–99)
HCT VFR BLD AUTO: 31.1 % (ref 40.5–52.5)
HGB BLD-MCNC: 10.3 G/DL (ref 13.5–17.5)
LYMPHOCYTES # BLD: 0.8 K/UL (ref 1–5.1)
LYMPHOCYTES NFR BLD: 7.3 %
MCH RBC QN AUTO: 30.4 PG (ref 26–34)
MCHC RBC AUTO-ENTMCNC: 33.1 G/DL (ref 31–36)
MCV RBC AUTO: 91.8 FL (ref 80–100)
MONOCYTES # BLD: 1.2 K/UL (ref 0–1.3)
MONOCYTES NFR BLD: 10.9 %
NEUTROPHILS # BLD: 8.8 K/UL (ref 1.7–7.7)
NEUTROPHILS NFR BLD: 80.6 %
PLATELET # BLD AUTO: 192 K/UL (ref 135–450)
PMV BLD AUTO: 8 FL (ref 5–10.5)
POTASSIUM SERPL-SCNC: 4.6 MMOL/L (ref 3.5–5.1)
RBC # BLD AUTO: 3.39 M/UL (ref 4.2–5.9)
SODIUM SERPL-SCNC: 136 MMOL/L (ref 136–145)
WBC # BLD AUTO: 11 K/UL (ref 4–11)

## 2024-04-26 PROCEDURE — 2580000003 HC RX 258: Performed by: NURSE PRACTITIONER

## 2024-04-26 PROCEDURE — 6360000002 HC RX W HCPCS: Performed by: NURSE PRACTITIONER

## 2024-04-26 PROCEDURE — 6370000000 HC RX 637 (ALT 250 FOR IP): Performed by: INTERNAL MEDICINE

## 2024-04-26 PROCEDURE — 99232 SBSQ HOSP IP/OBS MODERATE 35: CPT | Performed by: NURSE PRACTITIONER

## 2024-04-26 PROCEDURE — 85025 COMPLETE CBC W/AUTO DIFF WBC: CPT

## 2024-04-26 PROCEDURE — 36415 COLL VENOUS BLD VENIPUNCTURE: CPT

## 2024-04-26 PROCEDURE — 6370000000 HC RX 637 (ALT 250 FOR IP): Performed by: HOSPITALIST

## 2024-04-26 PROCEDURE — 2580000003 HC RX 258: Performed by: INTERNAL MEDICINE

## 2024-04-26 PROCEDURE — 80048 BASIC METABOLIC PNL TOTAL CA: CPT

## 2024-04-26 RX ORDER — DILTIAZEM HYDROCHLORIDE 60 MG/1
60 CAPSULE, EXTENDED RELEASE ORAL 2 TIMES DAILY
Status: DISCONTINUED | OUTPATIENT
Start: 2024-04-26 | End: 2024-04-26 | Stop reason: HOSPADM

## 2024-04-26 RX ADMIN — SODIUM CHLORIDE: 9 INJECTION, SOLUTION INTRAVENOUS at 10:22

## 2024-04-26 RX ADMIN — METOPROLOL TARTRATE 25 MG: 25 TABLET, FILM COATED ORAL at 08:12

## 2024-04-26 RX ADMIN — PANTOPRAZOLE SODIUM 40 MG: 40 TABLET, DELAYED RELEASE ORAL at 06:30

## 2024-04-26 RX ADMIN — SODIUM CHLORIDE 200 MG: 9 INJECTION, SOLUTION INTRAVENOUS at 10:23

## 2024-04-26 RX ADMIN — SODIUM BICARBONATE 650 MG: 650 TABLET ORAL at 08:12

## 2024-04-26 RX ADMIN — ATORVASTATIN CALCIUM 10 MG: 10 TABLET, FILM COATED ORAL at 08:12

## 2024-04-26 RX ADMIN — SODIUM CHLORIDE, PRESERVATIVE FREE 10 ML: 5 INJECTION INTRAVENOUS at 08:12

## 2024-04-26 ASSESSMENT — PAIN SCALES - GENERAL
PAINLEVEL_OUTOF10: 0

## 2024-04-26 NOTE — ADT AUTH CERT
[facility]  [facility]  [address] [address]  NPI: [id number]  TAX ID: [id number]    Auth number: N/A  [id number] - (Medicare Managed)    Return Contact Information:  Priyanka Dave  PH: [phone]  FAX: [phone]     Patient Demographics    Name Patient ID SSN Gender Identity Birth Date  Lawrence Rodriguez \"ÓSCAR\" [id number]  Male [birth year] (74 yrs)    Address Phone Email Employer   [address] [address] [phone] (M)  [phone] (H) [email or web address] RETIRED     County Race Occupation Emp Status   GARC[place] White (non-) -- Retired     Reg Status PCP Date Last Verified Next Review Date   Verified Jose Brown MD  [phone] 04/22/24 05/22/24     Admission Date Discharge Date Admitting Provider    04/22/24 04/26/24 Blayne Loya MD      Marital Status Sikh      [employer]       Emergency Contact 1 Emergency Contact 2  Yara Rodriguez (2)  3 [address]  [phone] (H) Ramesh Rodriguez (C)  [place]  [phone] (H)    Physician Progress Notes  Notes from 04/23/24 through 04/26/24  Progress Notes by Janes Biggs DO at 4/26/2024 11:28 AM    Author: Janes Biggs DO Service: Nephrology Author Type: Physician  Filed: 4/26/2024 11:31 AM Date of Service: 4/26/2024 11:28 AM Status: Signed  : Janes Biggs DO (Physician)  Expand All Collapse All    [facility]   Nephrology progress Note  [phone]   [employer]           Reason for Consult:  perinephric hematoma     HISTORY OF PRESENT ILLNESS:       The patient is a 74 y.o. male with significant past medical history of CKD, hypertension, CAD who presents with fall and perinephric hematoma. Per report, patient had a mechanical fall on left side and subsequent left flank pain. Noted to have perinephric hematoma with active extravasation. S/p IR guided embolization. Was on coumadin and aspirin for history of atrial    Hospitalist Progress Note        PCP: Jose Brown MD     Date of Admission: 4/22/2024     LOS: 1     Chief Complaint:     Chief Complaint  Patient presents with   Fall      Fell this AM around 4AM walking a dog. Fell on left flank. Unable to get comfortable. Pain 7 or 8 out of 10. Denies SOB. Took 2 tylenol at home.         Case Summary:   74-year-old gentleman with history of chronic atrial fibrillation on anticoagulation, stage III CKD with baseline creatinine of 1.6, type 2 diabetes, hypertension, CAD, hyperlipidemia, obesity sleep apnea who presented following a mechanical fall while walking his dog sustaining a left perinephric hematoma with active extravasation status post successful IR embolization of the left renal segmental branch artery 4/22/2023          Active Hospital Problems    Diagnosis Date Noted   Coronary atherosclerosis of native coronary artery [I25.10] 08/24/2011      Priority: Medium   Essential hypertension [I10] 08/24/2011      Priority: Medium   Fall against object [W18.00XA] 04/23/2024   Traumatic perinephric hematoma of left kidney [S37.092A] 04/22/2024   Chronic diastolic congestive heart failure (HCC) [I50.32] 04/06/2023   Chronic atrial fibrillation (HCC) [I48.20]             Principal Problem:    Traumatic perinephric hematoma of left kidney: Status post IR embolization of segmental branch vessels.  No further evidence of extravasation postprocedure.  H&H remained stable.  - Continue to monitor H&H and pain management       Leukocytosis: Unclear etiology.  Likely reactionary with underlying hematoma.  Noted bump from 16 up to 19 this morning.  Remains afebrile.  Will continue to monitor.  Hold off antibiotics.  CT chest on admission with no focal consolidation or edema.  And urinalysis unremarkable       Dizziness:  Noted soft blood pressure this morning with dizziness on getting up.  - Will hold off home antihypertensives this morning including diltiazem and metoprolol

## 2024-04-26 NOTE — PROGRESS NOTES
Discharge instructions given including to f/u w/ cardilogy, Addis to make f/u appt, peripheral iv removed w/ no complication, iron infusion transfused w/ no complication, pt and sons verbalized understanding and were discharged at 1325 w/ [facility] set up per CM.

## 2024-04-26 NOTE — PROGRESS NOTES
Urology Progress Note  [facility]     Patient: Lawrence Rodriguez MRN: [id number]  Room/Bed: T0C-7104/5907-01   YOB: 1950  Age/Sex: 74 y.o.male  Admission Date: 4/22/2024     Date of Service:  4/26/2024    ASSESSMENT/PLAN     1. Fall, initial encounter    2. Rib pain on left side    3. Anticoagulant long-term use    4. Hematoma of left kidney, initial encounter    5. Retroperitoneal hemorrhage    6. Goals of care, counseling/discussion      73yo male w/ hx of afib who was admitted for perinephric hematoma after a fall. He was on coumadin at the time of the fall. Had embolization with IR on 4/22. Patient showing decreased Hgb after procedure: 13.9 on 4/22 > 8.9 today. He denies any pain or urinary issues. Able to void on his own without GH seen. No CVA tenderness at this time. He does report some SOB when walking.   Afvss. Cr improving. Wbc wnl. H/h stable.     Recommendations:  Bed rest  Monitor h/h - these have been stable for 48 hours  Patient is hemodynamically stable. In the event the patient were to deteriorate, re-embolization would be the next step. I do not believe this will be needed, however we will continue to monitor. Call  with questions    All patient questions were answered. He understands the plan as listed above.    SUBJECTIVE     Chief Complaint:   Chief Complaint   Patient presents with    Fall     Fell this AM around 4AM walking a dog. Fell on left flank. Unable to get comfortable. Pain 7 or 8 out of 10. Denies SOB. Took 2 tylenol at home.        24 Hour Events: Today patient reports dong better, pain resolved.  Otherwise symptoms are overall improved and pain is adequately controlled.  Denies nausea/vomiting.  No other genitourinary symptoms.    OBJECTIVE     Hospital Problem List:  Principal Problem:    Traumatic perinephric hematoma of left kidney  Active Problems:    Essential hypertension    Coronary atherosclerosis of native coronary artery    Anticoagulant  demonstrated active extravasation along the lateral midpole, supplied by a small caliber segmental branch of the left renal artery.  Selective catheterization of this bleeding vessel was technically challenging. Successful embolization of the feeding segmental branch supplying the active focus of extravasation using a single 2 mm x 4 cm endovascular coil.  Post embolization DSA demonstrated no evidence of residual active bleeding or pseudoaneurysm formations. Patient's family was updated immediately postprocedure.     CTA ABDOMEN PELVIS W CONTRAST    Result Date: 4/22/2024  EXAMINATION: CTA OF THE ABDOMEN AND PELVIS WITH CONTRAST 4/22/2024 1:31 pm: TECHNIQUE: CTA of the abdomen and pelvis was performed with the administration of intravenous contrast. Multiplanar reformatted images are provided for review. MIP images are provided for review. Automated exposure control, iterative reconstruction, and/or weight based adjustment of the mA/kV was utilized to reduce the radiation dose to as low as reasonably achievable. COMPARISON: 03/04/2023 HISTORY: ORDERING SYSTEM PROVIDED HISTORY: concern for active bleed TECHNOLOGIST PROVIDED HISTORY: Reason for exam:->concern for active bleed Additional Contrast?->1 Reason for Exam: concern for active bleed FINDINGS: CTA ABDOMEN: The abdominal aorta is patent and normal in caliber.  The mesenteric arteries are patent.  There are single bilateral renal arteries which are patent. There is a large heterogeneous left perinephric hematoma with active contrast extravasation extending from the junction of the mid to lower pole.  On the delayed images, there was pooling of contrast and a cortical defect suggesting a laceration. The remainder of the solid organs are unremarkable.  There are 2 calcifications within the left kidney without evidence of urinary tract obstruction.  There is a 3 mm calcification in the right kidney without obstruction.  The gastrointestinal tract is within normal

## 2024-04-26 NOTE — DISCHARGE SUMMARY
Hospital Medicine Discharge Summary    Patient ID: Lawrence Rodriguez      Patient's PCP: Jose Brown MD    Admit Date: 4/22/2024     Discharge Date:   4/26/2024     Admitting Provider: Blayne Loya MD     Discharge Provider: Clint Laguna MD     Discharge Diagnoses:       Active Hospital Problems    Diagnosis     Coronary atherosclerosis of native coronary artery [I25.10]      Priority: Medium    Essential hypertension [I10]      Priority: Medium    Anticoagulant long-term use [Z79.01]      Priority: Medium    Orthostatic hypotension [I95.1]     Renal hematoma, left [S37.012A]     BRITTANY (acute kidney injury) (HCC) [N17.9]     Leucocytosis [D72.829]     Acute blood loss anemia [D62]     Hyperkalemia [E87.5]     Fall [W19.XXXA]     Traumatic perinephric hematoma of left kidney [S37.092A]     Chronic diastolic heart failure (HCC) [I50.32]     Chronic atrial fibrillation (HCC) [I48.20]        The patient was seen and examined on day of discharge and this discharge summary is in conjunction with any daily progress note from day of discharge.    Hospital Course:   The patient is a 74-year-old gentleman with history of chronic atrial fibrillation on anticoagulation, stage III CKD with baseline creatinine of 1.6, type 2 diabetes, hypertension, CAD, hyperlipidemia, obesity sleep apnea who presented following a mechanical fall while walking his dog sustaining a left perinephric hematoma with active extravasation status post successful IR embolization of the left renal segmental branch artery 4/22/2023      Traumatic perinephric hematoma of left kidney: Status post IR embolization of segmental branch vessels 4/22/2023.  Noted continued Hgb dropped down to 8.9 today from 13.9 on admission.  Status post 1 unit PRBC.  Hemoglobin at 10.3 this morning.  Denies flank pain.  Hemodynamic stable.  Repeat CT abdomen yesterday with no further evidence of bleed.  Outpatient follow-up with PCP          embolization DSA demonstrated no evidence of residual active bleeding or   pseudoaneurysm formations.      Patient's family was updated immediately postprocedure.         CTA ABDOMEN PELVIS W CONTRAST   Final Result   1. Large heterogeneous left perinephric hematoma with active contrast   extravasation extending from the junction of the mid to lower pole. On the   delayed images, there was pooling of contrast and a cortical defect   suggesting a laceration.   2. Bilateral nephrolithiasis      RECOMMENDATIONS:   The findings were discussed with the interventional radiologist at 2:45 p.m.   on 04/22/2024         CT C-Spine W/O Contrast   Final Result   Multilevel degenerative disc disease, without evidence of spondylolisthesis   or gross fracture.         CT HEAD WO CONTRAST   Final Result   No acute intracranial process.         CT CHEST WO CONTRAST   Final Result   Subcapsular hematoma involving the left kidney with surrounding   retroperitoneal hemorrhage.  If the patient has not had any recent    procedure (lithotripsy), this is likely hematoma and hemorrhage is post   traumatic from the recent fall.      Nonobstructing renal calculi      No pneumonia or edema.                Consults:     IP CONSULT TO INTERVENTIONAL RADIOLOGY  IP CONSULT TO NEPHROLOGY  IP CONSULT TO UROLOGY  IP CONSULT TO UROLOGY  IP CONSULT TO CARDIOLOGY  IP CONSULT TO HOME CARE NEEDS    Disposition: Home with home care    Condition at Discharge: Stable    Discharge Instructions/Follow-up:    PCP follow-up in 1 to 2 weeks  Outpatient nephrology and cardiology clinic follow-up    Code Status:  Full Code     Activity: activity as tolerated    Diet: diabetic diet      Discharge Medications:     Current Discharge Medication List             Details   warfarin (COUMADIN) 3 MG tablet Take 1 tablet by mouth daily      dilTIAZem (DILACOR XR) 120 MG extended release capsule Take 1 capsule by mouth daily      OZEMPIC, 0.25 OR 0.5 MG/DOSE, 2 MG/3ML

## 2024-04-26 NOTE — PLAN OF CARE
Problem: ABCDS Injury Assessment  Goal: Absence of physical injury  4/25/2024 2013 by Lilian Wise RN  Outcome: Progressing  4/25/2024 0851 by Mariann España RN  Outcome: Progressing     Problem: Pain  Goal: Verbalizes/displays adequate comfort level or baseline comfort level  4/25/2024 2013 by Lilian Wise RN  Outcome: Progressing  Flowsheets (Taken 4/25/2024 1933)  Verbalizes/displays adequate comfort level or baseline comfort level:   Encourage patient to monitor pain and request assistance   Assess pain using appropriate pain scale  4/25/2024 0851 by Mariann Espñaa RN  Outcome: Progressing     Problem: Chronic Conditions and Co-morbidities  Goal: Patient's chronic conditions and co-morbidity symptoms are monitored and maintained or improved  4/25/2024 2013 by Lilian Wise RN  Outcome: Progressing  Flowsheets (Taken 4/25/2024 2000)  Care Plan - Patient's Chronic Conditions and Co-Morbidity Symptoms are Monitored and Maintained or Improved:   Monitor and assess patient's chronic conditions and comorbid symptoms for stability, deterioration, or improvement   Collaborate with multidisciplinary team to address chronic and comorbid conditions and prevent exacerbation or deterioration  4/25/2024 0851 by Mariann España RN  Outcome: Progressing     Problem: Safety - Adult  Goal: Free from fall injury  4/25/2024 2013 by Lilian Wise RN  Outcome: Progressing  4/25/2024 0851 by Mariann España RN  Outcome: Progressing     Problem: Skin/Tissue Integrity  Goal: Absence of new skin breakdown  Description: 1.  Monitor for areas of redness and/or skin breakdown  2.  Assess vascular access sites hourly  3.  Every 4-6 hours minimum:  Change oxygen saturation probe site  4.  Every 4-6 hours:  If on nasal continuous positive airway pressure, respiratory therapy assess nares and determine need for appliance change or resting period.  4/25/2024 2013 by Lilian Wise RN  Outcome: 
  Problem: ABCDS Injury Assessment  Goal: Absence of physical injury  Outcome: Progressing     Problem: Pain  Goal: Verbalizes/displays adequate comfort level or baseline comfort level  Outcome: Progressing     Problem: Chronic Conditions and Co-morbidities  Goal: Patient's chronic conditions and co-morbidity symptoms are monitored and maintained or improved  Outcome: Progressing     Problem: Safety - Adult  Goal: Free from fall injury  Outcome: Progressing     Problem: Skin/Tissue Integrity  Goal: Absence of new skin breakdown  Description: 1.  Monitor for areas of redness and/or skin breakdown  2.  Assess vascular access sites hourly  3.  Every 4-6 hours minimum:  Change oxygen saturation probe site  4.  Every 4-6 hours:  If on nasal continuous positive airway pressure, respiratory therapy assess nares and determine need for appliance change or resting period.  Outcome: Progressing     
  Problem: ABCDS Injury Assessment  Goal: Absence of physical injury  Outcome: Progressing     Problem: Pain  Goal: Verbalizes/displays adequate comfort level or baseline comfort level  Outcome: Progressing     Problem: Chronic Conditions and Co-morbidities  Goal: Patient's chronic conditions and co-morbidity symptoms are monitored and maintained or improved  Outcome: Progressing  Flowsheets (Taken 4/24/2024 2000)  Care Plan - Patient's Chronic Conditions and Co-Morbidity Symptoms are Monitored and Maintained or Improved:   Monitor and assess patient's chronic conditions and comorbid symptoms for stability, deterioration, or improvement   Collaborate with multidisciplinary team to address chronic and comorbid conditions and prevent exacerbation or deterioration     Problem: Safety - Adult  Goal: Free from fall injury  Outcome: Progressing     Problem: Skin/Tissue Integrity  Goal: Absence of new skin breakdown  Description: 1.  Monitor for areas of redness and/or skin breakdown  2.  Assess vascular access sites hourly  3.  Every 4-6 hours minimum:  Change oxygen saturation probe site  4.  Every 4-6 hours:  If on nasal continuous positive airway pressure, respiratory therapy assess nares and determine need for appliance change or resting period.  Outcome: Progressing     
ROHINI Waite  Outcome: Progressing  4/24/2024 2234 by Lilian Wise, RN  Outcome: Progressing  Flowsheets (Taken 4/24/2024 2000)  Discharge to home or other facility with appropriate resources:   Identify barriers to discharge with patient and caregiver   Arrange for needed discharge resources and transportation as appropriate

## 2024-04-26 NOTE — TELEPHONE ENCOUNTER
Spoke to patient after discharge. Follow up arranged with Jessica Joyce NP on 5/3 at 1:15pm. Patient in agreement.

## 2024-04-26 NOTE — CARE COORDINATION
Case Management Assessment            Discharge Note                    Date / Time of Note: 4/26/2024 11:22 AM                  Discharge Note Completed by: JUAN MONTEIRO    Patient Name: Lawrence Rodriguez   YOB: 1950  Diagnosis: Retroperitoneal hemorrhage [R58]  Hematoma [T14.8XXA]  Anticoagulant long-term use [Z79.01]  Goals of care, counseling/discussion [Z71.89]  Rib pain on left side [R07.81]  Fall, initial encounter [W19.XXXA]  Hematoma of left kidney, initial encounter [S37.012A]   Date / Time: 4/22/2024  7:26 AM    Current PCP: Jose Brown MD  Clinic patient: No    Hospitalization in the last 30 days: No       Advance Directives:  Code Status: Full Code  [place] DNR form completed and on chart: Not Indicated    Financial:  Payor: MEDIGOLD / Plan: MEDIGOLD / Product Type: *No Product type* /      Pharmacy:    [id number] [place], [place] - [address] [facility] [phone] - F [phone]  [address] [place] [id number]  Phone: [phone] Fax: [phone]    [facility], NOW [place], [place] - [address] [facility] -  [phone] - F [phone]  [address] [facility]  [address] [place] [address]  Phone: [phone] Fax: [phone]    [facility] [place], [place] - [address] [facility] -  [phone] - F [phone]  [address] [id number]  Phone: [phone] Fax: [phone]      Assistance purchasing medications?: Potential Assistance Purchasing Medications: No  Assistance provided by Case Management: None at this time    Does patient want to participate in local refill/ meds to beds program?:      Meds To Beds General Rules:  1. Can ONLY be done Monday- Friday between 8:30am-5pm  2. Prescription(s) must be in pharmacy by 3pm to be filled same day  3.Copy of patient's insurance/ prescription drug card and patient face sheet must be sent along with the prescription(s)  4. Cost of Rx

## 2024-04-26 NOTE — PROGRESS NOTES
[facility]  HEART FAILURE  Progress Note      Admit Date 4/22/2024     Reason for Consult:      Reason for Consultation/Chief Complaint: fall    HPI:    Lawrence Rodriguez is a 74 y.o. male with PMH AF, PulmHTN, Non ob CAD, HTN, HLD, LUZ MARINA admitted after a fall. He has had recent wt loss on ozempic and lasix recently stopped at last OV.       Subjective:  Patient is being seen for CHF. There were no acute overnight cardiac events.   Today Mr. Rodriguez feels much better today and denies chest pain, shortness of breath, palpitations, or dizziness  I spent 10 minutes educating the patient on heart failure, medications, lifestyle modifications and dietary guidelines.       Baseline Weight: losing wt   Wt Readings from Last 3 Encounters:   04/26/24 119.3 kg (263 lb)   03/29/24 122.5 kg (270 lb)   03/27/24 122.5 kg (270 lb)         Cardiac Testing:   Echo:  4/24/24:  Summary   Technically difficult due to body habitus and poor acoustical window.   Definity was administered.   Left ventricular cavity size is normal with normal left ventricular wall   thickness. Ejection fraction is visually estimated to be 65-70%. Difficult   to determine wall motion due to abnormal arrhythmia, however, nothing   obvious. Grade III diastolic dysfunction with elevated LV filling pressures.   E/e' = 15.0.   Left atrium is dilated.   RIght atrium is dilated.   Mild to moderate tricuspid regurgitation.    NYHA Class II    Objective:   /66   Pulse 85   Temp (!) 96.6 °F (35.9 °C) (Temporal)   Resp 16   Ht 1.803 m (5' 11\")   Wt 119.3 kg (263 lb)   SpO2 96%   BMI 36.68 kg/m²     Intake/Output Summary (Last 24 hours) at 4/26/2024 0917  Last data filed at 4/26/2024 0033  Gross per 24 hour   Intake 730 ml   Output --   Net 730 ml      In: 970 [P.O.:960; I.V.:10]  Out: -     TELEMETRY: AF    Physical Exam:  General Appearance:  Well Nourished  Respiratory:  Resp Auscultation: Normal breath sounds without  dullness  Cardiovascular:  Auscultation: Regular rate and rhythm, normal S1S2, no m/g/r/c  Palpation: Normal    Pedal Pulses: 2+ and equal   Abdomen:  Soft, NT, ND, + bs  Extremities:  No Cyanosis or Clubbing  Extremities: negative  Neurological/Psychiatric:  Oriented to time, place, and person  Non-anxious    Pertinent labs, diagnostic, device, and imaging results reviewed as a part of this visit    MEDICATIONS:   Scheduled Meds:   Scheduled Meds:   sodium bicarbonate  650 mg Oral 4x Daily    atorvastatin  10 mg Oral Daily    metoprolol tartrate  25 mg Oral BID    pantoprazole  40 mg Oral QAM AC    sodium chloride flush  5-40 mL IntraVENous 2 times per day    insulin lispro  0-8 Units SubCUTAneous TID WC    insulin lispro  0-4 Units SubCUTAneous Nightly    [Held by provider] dilTIAZem  120 mg Oral Daily     Continuous Infusions:   sodium chloride      sodium chloride 75 mL/hr at 04/23/24 0544     PRN Meds:.sodium chloride, sodium chloride flush, sodium chloride, polyethylene glycol, acetaminophen **OR** acetaminophen, morphine, ondansetron  Continuous Infusions:   sodium chloride      sodium chloride 75 mL/hr at 04/23/24 0544       Intake/Output Summary (Last 24 hours) at 4/26/2024 0917  Last data filed at 4/26/2024 0033  Gross per 24 hour   Intake 730 ml   Output --   Net 730 ml       Lab Data:  CBC:   Lab Results   Component Value Date/Time    WBC 11.0 04/26/2024 04:50 AM    HGB 10.3 04/26/2024 04:50 AM     04/26/2024 04:50 AM     BMP:  Lab Results   Component Value Date/Time     04/26/2024 04:50 AM    K 4.6 04/26/2024 04:50 AM     04/26/2024 04:50 AM    CO2 26 04/26/2024 04:50 AM    BUN 25 04/26/2024 04:50 AM    CREATININE 1.4 04/26/2024 04:50 AM    GLUCOSE 91 04/26/2024 04:50 AM     INR:   Lab Results   Component Value Date/Time    INR 1.51 04/25/2024 04:53 AM    INR 2.04 04/24/2024 05:03 AM    INR 2.17 04/23/2024 05:09 AM        CARDIAC LABS  ENZYMES:No results for input(s): \"CKMB\",

## 2024-04-26 NOTE — PROGRESS NOTES
[facility]   Nephrology progress Note  [phone]  [phone]   Carma         Reason for Consult:  perinephric hematoma    HISTORY OF PRESENT ILLNESS:      The patient is a 74 y.o. male with significant past medical history of CKD, hypertension, CAD who presents with fall and perinephric hematoma. Per report, patient had a mechanical fall on left side and subsequent left flank pain. Noted to have perinephric hematoma with active extravasation. S/p IR guided embolization. Was on coumadin and aspirin for history of atrial fibrillation. Hemoglobin downtrending.   Creatinine at baseline seems be ~1.6 mg/dL, this as admission creatinine. Post procedure, this is now up to 2.7 >> 2.4 mg/dL this AM. Is non-oliguric. Does have persistent mild hyperkalemia with potassium in mid-5 range. Has not been seen by nephrologist previously.     Interval history:  Family at bedside. Feels much better today.   ROS - denies shortness of breath or flank pain.     Current Medications:    Scheduled Meds:   iron sucrose (VENOFER) 200 mg in sodium chloride 0.9 % 100 mL IVPB  200 mg IntraVENous Q24H    dilTIAZem  60 mg Oral BID    atorvastatin  10 mg Oral Daily    metoprolol tartrate  25 mg Oral BID    pantoprazole  40 mg Oral QAM AC    sodium chloride flush  5-40 mL IntraVENous 2 times per day    insulin lispro  0-8 Units SubCUTAneous TID WC    insulin lispro  0-4 Units SubCUTAneous Nightly     Continuous Infusions:   sodium chloride 75 mL/hr at 04/23/24 0544     PRN Meds:.sodium chloride flush, sodium chloride, polyethylene glycol, acetaminophen **OR** acetaminophen, morphine, ondansetron     PHYSICAL EXAM:    Vitals:    /66   Pulse 85   Temp (!) 96.6 °F (35.9 °C) (Temporal)   Resp 16   Ht 1.803 m (5' 11\")   Wt 119.3 kg (263 lb)   SpO2 96%   BMI 36.68 kg/m²     Intake/Output Summary (Last 24 hours) at 4/26/2024 1129  Last data filed at 4/26/2024 0033  Gross per 24 hour   Intake 730 ml

## 2024-04-28 NOTE — PROGRESS NOTES
98%   Weight: 117.5 kg (259 lb)   Height: 1.803 m (5' 11\")           Physical Exam  Vitals reviewed.   Constitutional:       Appearance: Normal appearance. He is normal weight.   HENT:      Head: Normocephalic and atraumatic.   Eyes:      Extraocular Movements: Extraocular movements intact.      Pupils: Pupils are equal, round, and reactive to light.   Cardiovascular:      Rate and Rhythm: Normal rate. Rhythm irregular.      Pulses: Normal pulses.      Heart sounds: Normal heart sounds.   Pulmonary:      Effort: Pulmonary effort is normal.      Breath sounds: Normal breath sounds.   Abdominal:      Palpations: Abdomen is soft.   Musculoskeletal:         General: Normal range of motion.      Cervical back: Normal range of motion and neck supple.   Skin:     General: Skin is warm and dry.   Neurological:      General: No focal deficit present.      Mental Status: He is alert and oriented to person, place, and time. Mental status is at baseline.   Psychiatric:         Mood and Affect: Mood normal.         Behavior: Behavior normal.         Thought Content: Thought content normal.         Judgment: Judgment normal.         Lab Data:    CBC:   Lab Results   Component Value Date/Time    WBC 11.0 04/26/2024 04:50 AM    WBC 12.2 04/25/2024 04:53 AM    WBC 15.6 04/24/2024 05:03 AM    RBC 3.39 04/26/2024 04:50 AM    RBC 3.01 04/25/2024 04:53 AM    RBC 2.98 04/24/2024 05:03 AM    HGB 10.3 04/26/2024 04:50 AM    HGB 10.1 04/25/2024 12:57 PM    HGB 9.4 04/25/2024 04:53 AM    HCT 31.1 04/26/2024 04:50 AM    HCT 30.1 04/25/2024 12:57 PM    HCT 27.4 04/25/2024 04:53 AM    MCV 91.8 04/26/2024 04:50 AM    MCV 90.8 04/25/2024 04:53 AM    MCV 92.5 04/24/2024 05:03 AM    RDW 14.7 04/26/2024 04:50 AM    RDW 14.7 04/25/2024 04:53 AM    RDW 15.1 04/24/2024 05:03 AM     04/26/2024 04:50 AM     04/25/2024 04:53 AM     04/24/2024 05:03 AM     BMP:  Lab Results   Component Value Date/Time     04/26/2024 04:50 AM

## 2024-04-29 ENCOUNTER — TELEPHONE (OUTPATIENT)
Dept: CARDIOLOGY CLINIC | Age: 74
End: 2024-04-29

## 2024-04-29 DIAGNOSIS — I50.9 HEART FAILURE, UNSPECIFIED HF CHRONICITY, UNSPECIFIED HEART FAILURE TYPE (HCC): ICD-10-CM

## 2024-04-29 DIAGNOSIS — D50.9 IRON DEFICIENCY ANEMIA, UNSPECIFIED IRON DEFICIENCY ANEMIA TYPE: Primary | ICD-10-CM

## 2024-04-29 DIAGNOSIS — K90.9 INTESTINAL MALABSORPTION, UNSPECIFIED TYPE: ICD-10-CM

## 2024-04-29 RX ORDER — SODIUM CHLORIDE 0.9 % (FLUSH) 0.9 %
5-40 SYRINGE (ML) INJECTION PRN
OUTPATIENT
Start: 2024-04-29

## 2024-04-29 RX ORDER — SODIUM CHLORIDE 9 MG/ML
5-250 INJECTION, SOLUTION INTRAVENOUS PRN
OUTPATIENT
Start: 2024-04-29

## 2024-04-29 NOTE — TELEPHONE ENCOUNTER
Spoke with NPCAITLIN who wants the following info given to pt.    She will order the iron infusions and fax to [facility].    Explained to patient that the Infusion Clinic will get PA from pt's insurance and then call to setup infusion appointments.

## 2024-05-03 ENCOUNTER — OFFICE VISIT (OUTPATIENT)
Dept: CARDIOLOGY CLINIC | Age: 74
End: 2024-05-03

## 2024-05-03 VITALS
HEART RATE: 87 BPM | SYSTOLIC BLOOD PRESSURE: 100 MMHG | BODY MASS INDEX: 36.26 KG/M2 | WEIGHT: 259 LBS | HEIGHT: 71 IN | DIASTOLIC BLOOD PRESSURE: 78 MMHG | OXYGEN SATURATION: 98 %

## 2024-05-03 DIAGNOSIS — R06.09 DOE (DYSPNEA ON EXERTION): ICD-10-CM

## 2024-05-03 DIAGNOSIS — Z09 HOSPITAL DISCHARGE FOLLOW-UP: ICD-10-CM

## 2024-05-03 DIAGNOSIS — D50.8 OTHER IRON DEFICIENCY ANEMIA: ICD-10-CM

## 2024-05-03 DIAGNOSIS — I95.1 ORTHOSTATIC HYPOTENSION: ICD-10-CM

## 2024-05-03 DIAGNOSIS — I48.20 CHRONIC ATRIAL FIBRILLATION (HCC): ICD-10-CM

## 2024-05-03 DIAGNOSIS — I50.32 CHRONIC DIASTOLIC CONGESTIVE HEART FAILURE (HCC): Primary | ICD-10-CM

## 2024-05-03 RX ORDER — SPIRONOLACTONE 25 MG/1
12.5 TABLET ORAL DAILY
Qty: 90 TABLET | Refills: 0 | Status: SHIPPED | OUTPATIENT
Start: 2024-05-03

## 2024-05-03 ASSESSMENT — ENCOUNTER SYMPTOMS: SHORTNESS OF BREATH: 1

## 2024-05-03 NOTE — PATIENT INSTRUCTIONS
Instructions:   Medications:  decrease spironolactone and jardiance to 1/2pill once a day  Labs with iron infusion next wek  Lifestyle Recommendations: Weigh yourself every day in the morning after urination, call Jessica if wt increases 2-3lb in one day or 5lb in one week, Limit sodium to 2000mg/day and fluids to 2L or 64oz/day.   Follow up: as scheduled         [place] [facility] Resource Line: [phone]

## 2024-05-08 ENCOUNTER — HOSPITAL ENCOUNTER (OUTPATIENT)
Dept: ONCOLOGY | Age: 74
Setting detail: INFUSION SERIES
Discharge: HOME | End: 2024-05-08
Payer: COMMERCIAL

## 2024-05-08 VITALS
HEART RATE: 61 BPM | DIASTOLIC BLOOD PRESSURE: 75 MMHG | RESPIRATION RATE: 16 BRPM | SYSTOLIC BLOOD PRESSURE: 110 MMHG | TEMPERATURE: 97.5 F

## 2024-05-08 DIAGNOSIS — I50.9 HEART FAILURE, UNSPECIFIED HF CHRONICITY, UNSPECIFIED HEART FAILURE TYPE (HCC): ICD-10-CM

## 2024-05-08 DIAGNOSIS — K90.9 INTESTINAL MALABSORPTION, UNSPECIFIED TYPE: Primary | ICD-10-CM

## 2024-05-08 DIAGNOSIS — D50.9 IRON DEFICIENCY ANEMIA, UNSPECIFIED IRON DEFICIENCY ANEMIA TYPE: ICD-10-CM

## 2024-05-08 PROCEDURE — 99211 OFF/OP EST MAY X REQ PHY/QHP: CPT

## 2024-05-08 PROCEDURE — 6360000002 HC RX W HCPCS: Performed by: NURSE PRACTITIONER

## 2024-05-08 PROCEDURE — 96365 THER/PROPH/DIAG IV INF INIT: CPT

## 2024-05-08 PROCEDURE — 2580000003 HC RX 258: Performed by: NURSE PRACTITIONER

## 2024-05-08 RX ORDER — SODIUM CHLORIDE 9 MG/ML
5-250 INJECTION, SOLUTION INTRAVENOUS PRN
Status: DISCONTINUED | OUTPATIENT
Start: 2024-05-08 | End: 2024-05-09 | Stop reason: HOSPADM

## 2024-05-08 RX ORDER — SODIUM CHLORIDE 0.9 % (FLUSH) 0.9 %
5-40 SYRINGE (ML) INJECTION PRN
Status: DISCONTINUED | OUTPATIENT
Start: 2024-05-08 | End: 2024-05-09 | Stop reason: HOSPADM

## 2024-05-08 RX ORDER — SODIUM CHLORIDE 0.9 % (FLUSH) 0.9 %
5-40 SYRINGE (ML) INJECTION PRN
OUTPATIENT
Start: 2024-05-15

## 2024-05-08 RX ORDER — SODIUM CHLORIDE 9 MG/ML
5-250 INJECTION, SOLUTION INTRAVENOUS PRN
OUTPATIENT
Start: 2024-05-15

## 2024-05-08 RX ADMIN — FERRIC CARBOXYMALTOSE INJECTION 750 MG: 50 INJECTION, SOLUTION INTRAVENOUS at 09:40

## 2024-05-08 RX ADMIN — SODIUM CHLORIDE 100 ML/HR: 9 INJECTION, SOLUTION INTRAVENOUS at 09:39

## 2024-05-08 NOTE — PROGRESS NOTES
Pt arrived to infusion center for initial dose of injectafer. Pt tolerated infusion well. No s/s of an adverse reaction. Pt given AVS summary with additional information on medication and s/s of side effects and a reaction. Pt denied any further questions, will return next week for same infusion.

## 2024-05-15 ENCOUNTER — HOSPITAL ENCOUNTER (OUTPATIENT)
Dept: ONCOLOGY | Age: 74
Setting detail: INFUSION SERIES
Discharge: HOME | End: 2024-05-15
Payer: COMMERCIAL

## 2024-05-15 VITALS
SYSTOLIC BLOOD PRESSURE: 121 MMHG | RESPIRATION RATE: 16 BRPM | DIASTOLIC BLOOD PRESSURE: 73 MMHG | TEMPERATURE: 96.9 F | HEART RATE: 76 BPM

## 2024-05-15 DIAGNOSIS — K90.9 INTESTINAL MALABSORPTION, UNSPECIFIED TYPE: Primary | ICD-10-CM

## 2024-05-15 DIAGNOSIS — I50.9 HEART FAILURE, UNSPECIFIED HF CHRONICITY, UNSPECIFIED HEART FAILURE TYPE (HCC): ICD-10-CM

## 2024-05-15 DIAGNOSIS — D50.9 IRON DEFICIENCY ANEMIA, UNSPECIFIED IRON DEFICIENCY ANEMIA TYPE: ICD-10-CM

## 2024-05-15 PROCEDURE — 2580000003 HC RX 258: Performed by: NURSE PRACTITIONER

## 2024-05-15 PROCEDURE — 99211 OFF/OP EST MAY X REQ PHY/QHP: CPT

## 2024-05-15 PROCEDURE — 6360000002 HC RX W HCPCS: Performed by: NURSE PRACTITIONER

## 2024-05-15 PROCEDURE — 96365 THER/PROPH/DIAG IV INF INIT: CPT

## 2024-05-15 RX ORDER — SODIUM CHLORIDE 9 MG/ML
5-250 INJECTION, SOLUTION INTRAVENOUS PRN
Status: CANCELLED | OUTPATIENT
Start: 2024-05-15

## 2024-05-15 RX ORDER — SODIUM CHLORIDE 0.9 % (FLUSH) 0.9 %
5-40 SYRINGE (ML) INJECTION PRN
Status: CANCELLED | OUTPATIENT
Start: 2024-05-15

## 2024-05-15 RX ORDER — SODIUM CHLORIDE 9 MG/ML
5-250 INJECTION, SOLUTION INTRAVENOUS PRN
Status: DISCONTINUED | OUTPATIENT
Start: 2024-05-15 | End: 2024-05-15

## 2024-05-15 RX ORDER — SODIUM CHLORIDE 0.9 % (FLUSH) 0.9 %
5-40 SYRINGE (ML) INJECTION PRN
Status: DISCONTINUED | OUTPATIENT
Start: 2024-05-15 | End: 2024-05-15

## 2024-05-15 RX ADMIN — Medication 10 ML: at 09:33

## 2024-05-15 RX ADMIN — FERRIC CARBOXYMALTOSE INJECTION 750 MG: 50 INJECTION, SOLUTION INTRAVENOUS at 09:34

## 2024-05-15 RX ADMIN — SODIUM CHLORIDE 100 ML/HR: 9 INJECTION, SOLUTION INTRAVENOUS at 09:33

## 2024-05-15 NOTE — PROGRESS NOTES
To clinic for second dose of Injectafer. Tolerated infusion well over 30 minutes. Given information about the medication including possible side effects. Verbalized understanding.  Patient to follow up provider for any further plan of care.
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
## 2024-07-10 ENCOUNTER — TELEPHONE (OUTPATIENT)
Dept: CARDIOLOGY CLINIC | Age: 74
End: 2024-07-10

## 2024-07-10 NOTE — TELEPHONE ENCOUNTER
Pt had echo one in hospital on  4/24/24  Has one scheduled for 7/24 prior to your OV    Does he need another one?

## 2024-07-10 NOTE — TELEPHONE ENCOUNTER
Pt called in wondering if he still needs echo done before 7/24 appt  since having one when he was in hospital   Please advise   Thank you

## 2024-07-12 ENCOUNTER — OFFICE VISIT (OUTPATIENT)
Dept: ORTHOPEDIC SURGERY | Age: 74
End: 2024-07-12
Payer: COMMERCIAL

## 2024-07-12 VITALS — HEIGHT: 71 IN | WEIGHT: 242 LBS | BODY MASS INDEX: 33.88 KG/M2

## 2024-07-12 DIAGNOSIS — M17.0 ARTHRITIS OF BOTH KNEES: Primary | ICD-10-CM

## 2024-07-12 PROCEDURE — 20610 DRAIN/INJ JOINT/BURSA W/O US: CPT | Performed by: ORTHOPAEDIC SURGERY

## 2024-07-12 RX ORDER — LIDOCAINE HYDROCHLORIDE 10 MG/ML
1 INJECTION, SOLUTION INFILTRATION; PERINEURAL ONCE
Status: COMPLETED | OUTPATIENT
Start: 2024-07-12 | End: 2024-07-12

## 2024-07-12 RX ORDER — BUPIVACAINE HYDROCHLORIDE 2.5 MG/ML
1 INJECTION, SOLUTION INFILTRATION; PERINEURAL ONCE
Status: COMPLETED | OUTPATIENT
Start: 2024-07-12 | End: 2024-07-12

## 2024-07-12 RX ORDER — TRIAMCINOLONE ACETONIDE 40 MG/ML
40 INJECTION, SUSPENSION INTRA-ARTICULAR; INTRAMUSCULAR ONCE
Status: COMPLETED | OUTPATIENT
Start: 2024-07-12 | End: 2024-07-12

## 2024-07-12 RX ADMIN — BUPIVACAINE HYDROCHLORIDE 2.5 MG: 2.5 INJECTION, SOLUTION INFILTRATION; PERINEURAL at 12:45

## 2024-07-12 RX ADMIN — TRIAMCINOLONE ACETONIDE 40 MG: 40 INJECTION, SUSPENSION INTRA-ARTICULAR; INTRAMUSCULAR at 12:47

## 2024-07-12 RX ADMIN — LIDOCAINE HYDROCHLORIDE 1 ML: 10 INJECTION, SOLUTION INFILTRATION; PERINEURAL at 12:46

## 2024-07-12 RX ADMIN — BUPIVACAINE HYDROCHLORIDE 2.5 MG: 2.5 INJECTION, SOLUTION INFILTRATION; PERINEURAL at 12:44

## 2024-07-12 NOTE — PROGRESS NOTES
ORTHOPAEDIC OFFICE NOTE    Chief Complaint   Patient presents with    Follow-up     Fu marianna knee       Knee Pain     7/12/24  Jesus Alberto returns to clinic today for follow-up of his knees  He reports the previous injections did help  He has upcoming trips to [place] in [place], and would like repeat injections today  He reports he has been walking a lot, and has lost over 60 pounds  He is on Ozempic  His A1c is improved/good  Previous injections did not affect his sugars  Pain is rated 3 out of 10      3/29/24  Jesus Alberto returns to clinic today for follow-up of bilateral knee pain  I last saw him almost 2 years ago and he had great relief with the steroid injections  He has been more active and trying to lose weight, he is walking a lot  He reports recent return of the knee pain, left worse than right  Mainly anterior lateral  He reports he has lost 45 pounds  Denies numbness and tingling      8/12/22  Jesus Alberto presents to clinic today due to left knee injury  He was in [place] when he injured it getting into the pool  He reports he jammed it  This occurred 2 days ago  He describes pain anterolaterally, rated 6-7/10  Worse with stairs/incline  Has been icing with some relief  He returned to [place] early because of this  His right knee is doing fine currently  Prior to this, both knees were doing well after the viscosupplementation in February 2/9/22  Here for knee visco      1/19/22  71 y.o. male seen for evaluation of bilateral knee pain:  Previous patient of Dr Garcia's  Onset chronic/years  Injury/trauma none  History of symptoms as above  Pain is located diffuse bilateral knees  Right worse than left  Worse with activity  Better with rest, prior knee injections  No prior PT  Pain rated 1/10 today      No Known Allergies     Current Outpatient Medications   Medication Sig Dispense Refill    spironolactone (ALDACTONE) 25 MG tablet Take 0.5 tablets by mouth daily 90 tablet 0    empagliflozin (JARDIANCE) 10 MG tablet

## 2024-07-24 ENCOUNTER — OFFICE VISIT (OUTPATIENT)
Dept: CARDIOLOGY CLINIC | Age: 74
End: 2024-07-24
Payer: COMMERCIAL

## 2024-07-24 VITALS
DIASTOLIC BLOOD PRESSURE: 88 MMHG | WEIGHT: 242 LBS | OXYGEN SATURATION: 96 % | BODY MASS INDEX: 33.88 KG/M2 | SYSTOLIC BLOOD PRESSURE: 128 MMHG | HEIGHT: 71 IN | HEART RATE: 61 BPM

## 2024-07-24 DIAGNOSIS — R06.09 DOE (DYSPNEA ON EXERTION): ICD-10-CM

## 2024-07-24 DIAGNOSIS — R06.02 SOB (SHORTNESS OF BREATH): ICD-10-CM

## 2024-07-24 DIAGNOSIS — D50.8 OTHER IRON DEFICIENCY ANEMIA: ICD-10-CM

## 2024-07-24 DIAGNOSIS — I50.32 CHRONIC DIASTOLIC CONGESTIVE HEART FAILURE (HCC): Primary | ICD-10-CM

## 2024-07-24 DIAGNOSIS — I48.20 CHRONIC ATRIAL FIBRILLATION (HCC): ICD-10-CM

## 2024-07-24 DIAGNOSIS — I10 ESSENTIAL HYPERTENSION: ICD-10-CM

## 2024-07-24 PROCEDURE — 3074F SYST BP LT 130 MM HG: CPT | Performed by: INTERNAL MEDICINE

## 2024-07-24 PROCEDURE — 3079F DIAST BP 80-89 MM HG: CPT | Performed by: INTERNAL MEDICINE

## 2024-07-24 PROCEDURE — 99215 OFFICE O/P EST HI 40 MIN: CPT | Performed by: INTERNAL MEDICINE

## 2024-07-24 PROCEDURE — 1123F ACP DISCUSS/DSCN MKR DOCD: CPT | Performed by: INTERNAL MEDICINE

## 2024-07-24 RX ORDER — METOPROLOL SUCCINATE 25 MG/1
25 TABLET, EXTENDED RELEASE ORAL DAILY
Qty: 90 TABLET | Refills: 3 | Status: SHIPPED | OUTPATIENT
Start: 2024-07-24

## 2024-07-24 NOTE — PATIENT INSTRUCTIONS
Plan:    Stop taking metoprolol , start Toprol 25mg daily  If BP is still high, call us and we will increase Diltiazem  Follow up with Dr. Stokes in 6 months  Call office if any worsening symptoms such as shortness of breath, swelling, or chest pain  Discontue the Propanalol (not on med list)

## 2024-07-30 NOTE — PROGRESS NOTES
Patient examined, record reviewed, agree with findings and recommendations as documented above.   Returns today for bilateral knee osteoarthritis. Is about to go on vacation. He is injected with steroid in both knees about 4 months ago and is beginning to become painful again. He like to be injected again if possible. ROS: Pertinent items are noted in HPI. No notes on file    Past Medical History:  No date: Atrial fibrillation ([facility]  No date: CAD (coronary artery disease)  No date: Cancer ([facility]      Comment:  skin  No date: Diabetes mellitus ([facility]  No date: Hypercholesteremia  No date: Hypertension  No date: Obesity  No date: Sleep apnea  2/2014: Ulcer of pyloric antrum      Comment:  anemia/GI bleed while on NSAIDS     Past Surgical History:  No date: COLONOSCOPY  No date: CYST REMOVAL  8/24/06: DIAGNOSTIC CARDIAC CATH LAB PROCEDURE  No date: KNEE SURGERY      Comment:  arthroscopy bilateral  02/07/14: UPPER GASTROINTESTINAL ENDOSCOPY      Comment:  EGD/Colonoscopy with biopsy of ulcer  03/19/2018: UPPER GASTROINTESTINAL ENDOSCOPY      Comment:  with bx    Review of patient's family history indicates:  Problem: Heart Disease      Relation: Mother          Age of Onset: (Not Specified)  Problem: Cancer      Relation: Father          Age of Onset: (Not Specified)  Problem: Cancer      Relation: Sister          Age of Onset: (Not Specified)          Comment: breast      Social History    Socioeconomic History      Marital status:       Spouse name: None      Number of children: None      Years of education: None      Highest education level: None    Occupational History      None    Tobacco Use      Smoking status: Never Smoker      Smokeless tobacco: Never Used    Vaping Use      Vaping Use: Never used    Substance and Sexual Activity      Alcohol use:  Yes        Alcohol/week: 1.0 standard drinks        Types: 1 Cans of beer per week        Comment: occas      Drug use: No      Sexual activity: Yes        Partners: Female    Other Topics      Concerns:        None    Social History Narrative None    Social Determinants of Health  Financial Resource Strain:       Difficulty of Paying Living Expenses:   Food Insecurity:       Worried About Running Out of Food in the Last Year:       Ran Out of Food in the Last Year:   Transportation Needs:       Lack of Transportation (Medical):       Lack of Transportation (Non-Medical):   Physical Activity:       Days of Exercise per Week:       Minutes of Exercise per Session:   Stress:       Feeling of Stress :   Social Connections:       Frequency of Communication with Friends and Family:       Frequency of Social Gatherings with Friends and Family:       Attends Tenriism Services:       Active Member of Clubs or Organizations:       Attends Club or Organization Meetings:       Marital Status:   Intimate Partner Violence:       Fear of Current or Ex-Partner:       Emotionally Abused:       Physically Abused:       Sexually Abused:     Current Outpatient Medications:  vitamin D3 (CHOLECALCIFEROL) 25 MCG (1000 UT) TABS tablet, Take 1 tablet by mouth daily, Disp: 90 tablet, Rfl: 0  spironolactone (ALDACTONE) 25 MG tablet, TAKE 1 TABLET BY MOUTH DAILY, Disp: 90 tablet, Rfl: 3  dilTIAZem (CARDIZEM CD) 240 MG extended release capsule, TAKE 1 CAPSULE BY MOUTH DAILY, Disp: 90 capsule, Rfl: 3  lisinopril-hydroCHLOROthiazide (PRINZIDE;ZESTORETIC) 20-12.5 MG per tablet, TAKE 1 TABLET BY MOUTH DAILY, Disp: 90 tablet, Rfl: 1  furosemide (LASIX) 20 MG tablet, Take 1 tablet by mouth daily Or as directed, Disp: 90 tablet, Rfl: 3  metoprolol tartrate (LOPRESSOR) 25 MG tablet, Take 1 tablet by mouth 2 times daily, Disp: 180 tablet, Rfl: 3  warfarin (COUMADIN) 5 MG tablet, One tab daily, as directedOn Saturday 7/13 and Sunday 7/14 take only 1/2 tabs. Get INR on Monday 7/15 (Patient taking differently: [facility] daily, as directedOn Saturday 7/13 and Sunday 7/14 take only 1/2 tabs.   Get INR on Monday 7/15), Disp: 30 tablet, Rfl: 3  pantoprazole (PROTONIX) 40 MG tablet, Take 1 tablet by mouth every morning (before breakfast), Disp: 90 tablet, Rfl: 0  metFORMIN (GLUCOPHAGE) 1000 MG tablet, Take 1,000 mg by mouth 2 times daily (with meals). , Disp: , Rfl:   zolpidem (AMBIEN) 10 MG tablet, nightly as needed . , Disp: , Rfl: 2  atorvastatin (LIPITOR) 10 MG tablet, Take 1 tablet by mouth daily. , Disp: 30 tablet, Rfl: 0  aspirin EC 81 MG EC tablet, Take 1 tablet by mouth daily. , Disp: 30 tablet, Rfl: 3    No current facility-administered medications for this visit. -- No Known Allergies     VITAL SIGNS:  Temp 98.6 °F (37 °C)   Ht 5' 10\" (1.778 m)   Wt 292 lb (132.5 kg)   BMI 41.90 kg/m²   On examination of the left knee today he lacks 3 or 4 degrees of extension and has about 100 degrees of flexion there is no warmth, erythema, or effusion. He has good medial lateral stability. He has a negative Lachman's and pivot shift. Examination right knee shows he again lacks about 5 degrees of extension but only flexes to about 80 degrees. There is no warmth, erythema, or effusion. He has a negative Lachman's and pivot shift. He has good medial lateral stability. Previous x-ray shows that he has significant medial joint space narrowing in both knees. Impression bilateral bilateral knee osteoarthritis. Plan: After sterile prep injected left knee with 80 mg of Depo-Medrol and 15 mg of ropivacaine. The patient tolerated this procedure well. After sterile prep we injected the right knee with 80 mg of Depo-Medrol and 15 mg ropivacaine. The patient tolerated this procedure well. Time for examination reviewing the patient's symptoms reviewing old x-rays and giving 2 injections lasted approximately 20 minutes.

## 2024-09-03 ENCOUNTER — TELEPHONE (OUTPATIENT)
Dept: OTHER | Age: 74
End: 2024-09-03

## 2024-09-03 RX ORDER — SPIRONOLACTONE 25 MG/1
12.5 TABLET ORAL DAILY
Qty: 45 TABLET | Refills: 0 | Status: SHIPPED | OUTPATIENT
Start: 2024-09-03 | End: 2024-09-05 | Stop reason: SDUPTHER

## 2024-09-03 NOTE — TELEPHONE ENCOUNTER
Patient phoned HF Resource line. States he needs a refill on his spironolactone. He did not realize he was only supposed to be taking half a tablet (12.5 mg) --was taking a whole instead--25 mg-- so is now out of medications [facility]   verbal instruction

## 2024-09-04 ENCOUNTER — HOSPITAL ENCOUNTER (OUTPATIENT)
Age: 74
Discharge: HOME | End: 2024-09-04
Payer: COMMERCIAL

## 2024-09-04 DIAGNOSIS — I50.32 CHRONIC DIASTOLIC CONGESTIVE HEART FAILURE (HCC): ICD-10-CM

## 2024-09-04 LAB
ANION GAP SERPL CALCULATED.3IONS-SCNC: 9 MMOL/L (ref 3–16)
BUN SERPL-MCNC: 20 MG/DL (ref 7–20)
CALCIUM SERPL-MCNC: 9.2 MG/DL (ref 8.3–10.6)
CHLORIDE SERPL-SCNC: 106 MMOL/L (ref 99–110)
CO2 SERPL-SCNC: 23 MMOL/L (ref 21–32)
CREAT SERPL-MCNC: 1.1 MG/DL (ref 0.8–1.3)
GFR SERPLBLD CREATININE-BSD FMLA CKD-EPI: 71 ML/MIN/{1.73_M2}
GLUCOSE SERPL-MCNC: 105 MG/DL (ref 70–99)
NT-PROBNP SERPL-MCNC: 2322 PG/ML (ref 0–124)
POTASSIUM SERPL-SCNC: 4.1 MMOL/L (ref 3.5–5.1)
SODIUM SERPL-SCNC: 138 MMOL/L (ref 136–145)

## 2024-09-04 PROCEDURE — 36415 COLL VENOUS BLD VENIPUNCTURE: CPT

## 2024-09-04 PROCEDURE — 80048 BASIC METABOLIC PNL TOTAL CA: CPT

## 2024-09-04 PROCEDURE — 83880 ASSAY OF NATRIURETIC PEPTIDE: CPT

## 2024-09-04 NOTE — TELEPHONE ENCOUNTER
Spoke to pt about need for labs and why.  States he will go today.  Also informed medication was sent to the pharmacy for him.  Lab orders in system already from May.

## 2024-09-05 ENCOUNTER — TELEPHONE (OUTPATIENT)
Dept: CARDIOLOGY CLINIC | Age: 74
End: 2024-09-05

## 2024-09-05 RX ORDER — SPIRONOLACTONE 25 MG/1
25 TABLET ORAL DAILY
Qty: 90 TABLET | Refills: 1 | Status: SHIPPED | OUTPATIENT
Start: 2024-09-05

## 2024-09-05 NOTE — TELEPHONE ENCOUNTER
----- Message from SHAMAR Kat CNP sent at 9/5/2024  8:53 AM EDT -----  Labs ok, he can continue mahogany one pill per day, I will refill. JOSIE  Spoke to patient he verbalized understanding

## 2025-01-08 NOTE — PROGRESS NOTES
[facility]  Advanced CHF/Pulmonary Hypertension   Cardiac Evaluation      Lawrence Rodriguez  YOB: 1950    Date of Visit:  1/24/25    Chief Complaint   Patient presents with    Congestive Heart Failure      History of Present Illness:  Lawrence Rodriguez is a 74 y.o. male with a past medical history of PHTN, non-obstructive CAD, HTN, HLD, LUZ MARINA, & chronic atrial fibrillation diagnosed in 2004 treated with sotalol & Coumadin.  He had an acute GIB in March 2018 and was started on PPI.   He now has increased RVSP from 41mmHg in August 2014 to 70mmHg in Sept 2019.  He was started on spironolactone.  He had his sleep apnea testing, and it was severely positive.  He failed CPAP and was changed to Bipap.  He had Covid Nov 2020 (symptoms primarily loss of taste & smell, no dyspnea).    LOV 8/12/24: stop taking metoprolol, start taking Toprol XL 25 mg daily    Pt was last seen in office 7/24/2024 and presents today for a follow up of chronic diastolic heart failure. Last EF 68% on 4/24/24. Has not taken his morning medications today.  Repeat BP in office was 124/70. Had blood work 2 weeks ago with Dr. Brown, requesting those labs.   He states his HR at rest has gone to 40. Happens at rest and usually between 5-11pm. He complains of orthopnea. Slight edema to bilateral feet. Denies chest pain or palpitations. Labs faxed from [facility]; A1C 5.7 on 12/5/24.    NYHA Class II-III    No Known Allergies    Current Outpatient Medications   Medication Sig Dispense Refill    spironolactone (ALDACTONE) 25 MG tablet Take 1 tablet by mouth daily 90 tablet 1    empagliflozin (JARDIANCE) 10 MG tablet Take 1 tablet by mouth daily 90 tablet 0    warfarin (COUMADIN) 3 MG tablet Take 1 tablet by mouth daily      dilTIAZem (DILACOR XR) 120 MG extended release capsule Take 1 capsule by mouth daily      OZEMPIC, 0.25 OR 0.5 MG/DOSE, 2 MG/3ML SOPN Inject 2 mg into the skin once a week      pantoprazole (PROTONIX) 40 MG tablet

## 2025-01-24 ENCOUNTER — OFFICE VISIT (OUTPATIENT)
Dept: CARDIOLOGY CLINIC | Age: 75
End: 2025-01-24

## 2025-01-24 VITALS
WEIGHT: 255 LBS | HEART RATE: 80 BPM | DIASTOLIC BLOOD PRESSURE: 70 MMHG | BODY MASS INDEX: 35.7 KG/M2 | SYSTOLIC BLOOD PRESSURE: 124 MMHG | OXYGEN SATURATION: 95 % | HEIGHT: 71 IN

## 2025-01-24 DIAGNOSIS — I10 ESSENTIAL HYPERTENSION: ICD-10-CM

## 2025-01-24 DIAGNOSIS — R06.02 SOB (SHORTNESS OF BREATH): ICD-10-CM

## 2025-01-24 DIAGNOSIS — R06.09 DOE (DYSPNEA ON EXERTION): ICD-10-CM

## 2025-01-24 DIAGNOSIS — I50.32 CHRONIC DIASTOLIC CONGESTIVE HEART FAILURE (HCC): Primary | ICD-10-CM

## 2025-01-24 DIAGNOSIS — I48.20 CHRONIC ATRIAL FIBRILLATION (HCC): ICD-10-CM

## 2025-01-24 NOTE — PATIENT INSTRUCTIONS
Stop taking metoprolol due to low heart rate  Echo in 6 months  Follow up with Marcia Stokes MD in 6 months  Call my office for any worsening symptoms such as shortness of breath, chest pain, sudden weight gain or loss, or any increased [phone]

## 2025-03-12 RX ORDER — SPIRONOLACTONE 25 MG/1
25 TABLET ORAL DAILY
Qty: 90 TABLET | Refills: 3 | Status: SHIPPED | OUTPATIENT
Start: 2025-03-12

## 2025-03-12 NOTE — TELEPHONE ENCOUNTER
Prescription refill    Last OV:01/24/2025    Last Refill:09/05/2024    Labs:12/006/2024    Future Appt:07/07/2025

## 2025-04-29 NOTE — PROGRESS NOTES
Patient reached _x___ yes  _____ no         MY Chart message sent  _yes____  VM instructions left ____ yes   phone number ________                                ____ no-office notified    [address]-[place], [place]   [address]      Date _5/5/25________  Time __1000_____  Arrival _0830__/per office___    Nothing to eat or drink after midnight-follow your doctors prep instructions-this may include taking a second dose of your prep after midnight    Responsible adult 18 or older to stay on site while you are here-drive you home-stay with you after    Dress comfortably-No makeup or jewlrey    Follow any instructions your doctors office has given you    Bring a complete list of all your medications and supplements including name,dose,how often taken the day of your procedure     If you normally take the following medications in the morning please do so the AM of your procedure with a small sip of water       Heart,blood pressure,seizure,thyroid or breathing medications-use your inhalers-bring any rescue inhalers with you DOS       DO NOT take blood pressure medications ending in \"tano\" or \"pril\" the AM of procedure or evening prior    Take half or your normal dose of any long acting insulins the night before your procedure-do not take any diabetic medications the AM of procedure.     If you take a weekly injection for diabetes or weight loss-do not take one week prior to surgery/procedure.If you have already taken your injection this week,contact your surgeon. There is a possibility of cancellation if taken.Stopped Ozempic ~4/1    If you take any SGLT2  medications such as Jardiance ,Invokana, Synjardy or Farxiga. You need to stop these 3 days prior to surgery/procedure. If you have already taken, contact your surgeon. There is a possibility of cancellation if taken. To stop jardiance 5/2/25    Follow your doctors instructions regarding stopping or taking  any blood thinners-if you do not have  DISPLAY PLAN FREE TEXT

## 2025-05-05 ENCOUNTER — ANESTHESIA EVENT (OUTPATIENT)
Dept: ENDOSCOPY | Age: 75
End: 2025-05-05
Payer: COMMERCIAL

## 2025-05-05 ENCOUNTER — ANESTHESIA (OUTPATIENT)
Dept: ENDOSCOPY | Age: 75
End: 2025-05-05
Payer: COMMERCIAL

## 2025-05-05 ENCOUNTER — HOSPITAL ENCOUNTER (OUTPATIENT)
Age: 75
Setting detail: OUTPATIENT SURGERY
Discharge: HOME | End: 2025-05-05
Attending: INTERNAL MEDICINE | Admitting: INTERNAL MEDICINE
Payer: COMMERCIAL

## 2025-05-05 VITALS
TEMPERATURE: 96.9 F | HEART RATE: 83 BPM | HEIGHT: 70 IN | RESPIRATION RATE: 16 BRPM | BODY MASS INDEX: 34.36 KG/M2 | OXYGEN SATURATION: 98 % | DIASTOLIC BLOOD PRESSURE: 57 MMHG | SYSTOLIC BLOOD PRESSURE: 111 MMHG | WEIGHT: 240 LBS

## 2025-05-05 DIAGNOSIS — Z86.0100 HISTORY OF COLONIC POLYPS: ICD-10-CM

## 2025-05-05 DIAGNOSIS — Z80.0 FAMILY HISTORY OF COLON CANCER: ICD-10-CM

## 2025-05-05 DIAGNOSIS — Z12.11 COLON CANCER SCREENING: ICD-10-CM

## 2025-05-05 LAB
GLUCOSE BLD-MCNC: 117 MG/DL (ref 70–99)
GLUCOSE BLD-MCNC: 98 MG/DL (ref 70–99)
PERFORMED ON: ABNORMAL
PERFORMED ON: NORMAL

## 2025-05-05 PROCEDURE — 2709999900 HC NON-CHARGEABLE SUPPLY: Performed by: INTERNAL MEDICINE

## 2025-05-05 PROCEDURE — 7100000010 HC PHASE II RECOVERY - FIRST 15 MIN: Performed by: INTERNAL MEDICINE

## 2025-05-05 PROCEDURE — 88305 TISSUE EXAM BY PATHOLOGIST: CPT

## 2025-05-05 PROCEDURE — 2580000003 HC RX 258: Performed by: ANESTHESIOLOGY

## 2025-05-05 PROCEDURE — 3609010600 HC COLONOSCOPY POLYPECTOMY SNARE/COLD BIOPSY: Performed by: INTERNAL MEDICINE

## 2025-05-05 PROCEDURE — 3700000000 HC ANESTHESIA ATTENDED CARE: Performed by: INTERNAL MEDICINE

## 2025-05-05 PROCEDURE — 7100000011 HC PHASE II RECOVERY - ADDTL 15 MIN: Performed by: INTERNAL MEDICINE

## 2025-05-05 PROCEDURE — 3700000001 HC ADD 15 MINUTES (ANESTHESIA): Performed by: INTERNAL MEDICINE

## 2025-05-05 PROCEDURE — 6360000002 HC RX W HCPCS: Performed by: NURSE ANESTHETIST, CERTIFIED REGISTERED

## 2025-05-05 RX ORDER — PROPOFOL 10 MG/ML
INJECTION, EMULSION INTRAVENOUS
Status: DISCONTINUED | OUTPATIENT
Start: 2025-05-05 | End: 2025-05-05 | Stop reason: SDUPTHER

## 2025-05-05 RX ORDER — LIDOCAINE HYDROCHLORIDE 20 MG/ML
INJECTION, SOLUTION EPIDURAL; INFILTRATION; INTRACAUDAL; PERINEURAL
Status: DISCONTINUED | OUTPATIENT
Start: 2025-05-05 | End: 2025-05-05 | Stop reason: SDUPTHER

## 2025-05-05 RX ORDER — SODIUM CHLORIDE 9 MG/ML
INJECTION, SOLUTION INTRAVENOUS CONTINUOUS
Status: DISCONTINUED | OUTPATIENT
Start: 2025-05-05 | End: 2025-05-05 | Stop reason: HOSPADM

## 2025-05-05 RX ADMIN — PROPOFOL 100 MCG/KG/MIN: 10 INJECTION, EMULSION INTRAVENOUS at 09:19

## 2025-05-05 RX ADMIN — SODIUM CHLORIDE: 9 INJECTION, SOLUTION INTRAVENOUS at 09:16

## 2025-05-05 RX ADMIN — PROPOFOL 75 MG: 10 INJECTION, EMULSION INTRAVENOUS at 09:18

## 2025-05-05 RX ADMIN — LIDOCAINE HYDROCHLORIDE 50 MG: 20 INJECTION, SOLUTION EPIDURAL; INFILTRATION; INTRACAUDAL; PERINEURAL at 09:18

## 2025-05-05 ASSESSMENT — PAIN - FUNCTIONAL ASSESSMENT
PAIN_FUNCTIONAL_ASSESSMENT: NONE - DENIES PAIN

## 2025-05-05 ASSESSMENT — ENCOUNTER SYMPTOMS: SHORTNESS OF BREATH: 1

## 2025-05-05 NOTE — ANESTHESIA POSTPROCEDURE EVALUATION
Department of Anesthesiology  Postprocedure Note    Patient: Lawrence Rodriguez  MRN: [id number]  YOB: 1950  Date of evaluation: 5/5/2025    Procedure Summary       Date: 05/05/25 Room / Location: [place] [address] / [facility]    Anesthesia Start: 0916 Anesthesia Stop: 0940    Procedure: COLONOSCOPY POLYPECTOMY SNARE/BIOPSY Diagnosis:       History of colonic polyps      Family history of colon cancer      Colon cancer screening      (History of colonic polyps [Z86.0100])      (Family history of colon cancer [Z80.0])      (Colon cancer screening [Z12.11])    Surgeons: Bony Diggs MD Responsible Provider: Maximilian Roman MD    Anesthesia Type: MAC ASA Status: 3            Anesthesia Type: MAC    Mei Phase I: Mei Score: 10    Mei Phase II:      Anesthesia Post Evaluation    Patient location during evaluation: PACU  Patient participation: complete - patient participated  Level of consciousness: awake and alert  Airway patency: patent  Nausea & Vomiting: no nausea and no vomiting  Cardiovascular status: hemodynamically stable  Respiratory status: acceptable  Hydration status: euvolemic  Multimodal analgesia pain management approach  Pain management: satisfactory to patient    No notable events documented.

## 2025-05-05 NOTE — ANESTHESIA PRE PROCEDURE
Department of Anesthesiology  Preprocedure Note       Name:  Lawrence Rodriguez   Age:  75 y.o.  :  1950                                          MRN:  [id number]         Date:  2025      Surgeon: Surgeon(s):  Bony Diggs MD    Procedure: Procedure(s):  COLONOSCOPY DIAGNOSTIC    Medications prior to admission:   Prior to Admission medications    Medication Sig Start Date End Date Taking? Authorizing Provider   spironolactone (ALDACTONE) 25 MG tablet Take 1 tablet by mouth daily 3/12/25  Yes Marcia Stokes MD   warfarin (COUMADIN) 3 MG tablet Take 1 tablet by mouth daily   Yes ProviderAlis MD   dilTIAZem (DILACOR XR) 120 MG extended release capsule Take 1 capsule by mouth daily   Yes Alis Rodriguez MD   pantoprazole (PROTONIX) 40 MG tablet TAKE 1 TABLET(40 MG) BY MOUTH DAILY 21  Yes Reyes, Eleia J, MD   zolpidem (AMBIEN) 10 MG tablet Take 1 tablet by mouth nightly as needed for Sleep. 14  Yes Alis Rodriguez MD   atorvastatin (LIPITOR) 10 MG tablet Take 1 tablet by mouth daily. 14  Yes Leti Henley APRN - CNP   aspirin EC 81 MG EC tablet Take 1 tablet by mouth daily 10/21/13  Yes Leti Henley APRN - CNP   empagliflozin (JARDIANCE) 10 MG tablet Take 1 tablet by mouth daily 5/3/24   Jessica Joyce APRN - CNP   OZEMPIC, 0.25 OR 0.5 MG/DOSE, 2 MG/3ML SOPN Inject 2 mg into the skin once a week 23   ProviderAlis MD       Current medications:    Current Facility-Administered Medications   Medication Dose Route Frequency Provider Last Rate Last Admin   • 0.9 % sodium chloride infusion   IntraVENous Continuous Maximilian Roman MD           Allergies:  No Known Allergies    Problem List:    Patient Active Problem List   Diagnosis Code   • Essential hypertension I10   • Coronary atherosclerosis of native coronary artery I25.10   • Sleep apnea G47.30   • Anticoagulant long-term use Z79.01   • Anemia D64.9   • GI bleed K92.2   • Pain in joint, lower

## 2025-05-05 NOTE — H&P
Gastroenterology Note                 Pre-operative History and Physical    Patient: Lawrence Rodriguez  : 1950  CSN:     History Obtained From:   Patient or guardian.      HISTORY OF PRESENT ILLNESS:    The patient is a 75 y.o. male here for Endoscopy.      Past Medical History:    Past Medical History:   Diagnosis Date    Arthritis     Atrial fibrillation (HCC)     CAD (coronary artery disease)     Cancer (HCC)     skin    Chronic diastolic heart failure (HCC)     Colon polyp     Diabetes mellitus (HCC)     History of blood transfusion     Hypercholesteremia     Hypertension     Obesity     Sleep apnea     does not use Cpap machine at present    Ulcer of pyloric antrum 2014    anemia/GI bleed while on NSAIDS    Wears glasses      Past Surgical History:    Past Surgical History:   Procedure Laterality Date    ARM SURGERY Right 2022    RIGHT ULNAR NERVE DECOMPRESSION (AT ELBOW) performed by Yogi Murphy MD at [facility] OR    ARM SURGERY Left 2024    LEFT ULNAR NERVE DECOMPRESSION (AT ELBOW) performed by Yogi Murphy MD at [facility] OR    CARPAL TUNNEL RELEASE Right 2022    RIGHT CARPAL TUNNEL RELEASE performed by Yogi Murphy MD at [facility] OR    CARPAL TUNNEL RELEASE Left 2024    LEFT CARPAL TUNNEL RELEASE performed by Yogi Murphy MD at [facility] OR    COLONOSCOPY      COLONOSCOPY N/A 2023    COLONOSCOPY POLYPECTOMY SNARE/COLD BIOPSY performed by Bony Diggs MD at [facility] ENDOSCOPY    CYST REMOVAL      from back side--fatty lipoma    DIAGNOSTIC CARDIAC CATH LAB PROCEDURE  2006    IR EMBOLIZATION HEMORRHAGE  2024    IR EMBOLIZATION HEMORRHAGE 2024 Slava Serrato DO [facility] SPECIAL PROCEDURES    KNEE SURGERY      arthroscopy bilateral    MOHS SURGERY Right     nares x3    UPPER GASTROINTESTINAL ENDOSCOPY  2014    EGD/Colonoscopy with biopsy of ulcer    UPPER GASTROINTESTINAL ENDOSCOPY  2018    with bx     Medications Prior to Admission:

## 2025-05-05 NOTE — DISCHARGE INSTRUCTIONS
COLONSCOPY DISCHARGE INSTRUCTIONS    You may experience some lightheadedness for the next several hours.  Plan on quiet relaxation for the rest of today. Nap for four hours following procedure if possible.  A responsible adult needs to stay with you today.    Eat bland food and avoid anything greasy or spicy initially-progress to your normal diet gradually.  Diet restrictions as instructed.  You may resume home medications as instructed.    It is not unusual to experience some mild cramping or gas pains, and you may not have a bowel movement for several days.  If you had a polyp removed, avoid strenuous activity for 48 hours.  Avoid the use of aspirin or related compounds for one week, unless otherwise instructed by your physician.    You may notice a small amount of blood in your next few bowel movements, but if a large amount passes, call your physician.    If you have any of the following problems, notify your physician or return to the hospital emergency room : fever, chills, excessive bleeding, excessive vomiting, difficulty swallowing, uncontrolled pain, increased abdominal distention, shortness of breath or any other problems.    Call your doctor at [phone] if you have any concerns.     If you had any biopsies or polyps call for results in 14 business days.    See your physician's report for details about your procedure and recommendations.      ANESTHESIA DISCHARGE INSTRUCTIONS    Wear your seatbelt home.    You are under the influence of drugs-do not drink alcohol, drive ,operate machinery,or make any important decisions or sign any legal documentsfor 24 hours. You may resume normal activities tomorrow.  A responsible adult needs to be with you for 24 hours.  You may experience lightheadedness,dizziness,or sleepiness following surgery.    Rest at home today- increase activity as tolerated. It is recommended to take a four hour nap after procedure.  Progress slowly to a regular diet unless your

## 2025-05-05 NOTE — PROGRESS NOTES
Discharge instructions reviewed with patient/responsible adult. All home medications have been reviewed, questions answered and patient verbalized understanding.  Discharge instructions signed and copies given. Patient discharged via wheelchair to home with belongings.   Post-procedure education reviewed with patient and visitor, including resuming of medication regimen, and they verbalized understanding.

## 2025-05-14 ENCOUNTER — OFFICE VISIT (OUTPATIENT)
Dept: ORTHOPEDIC SURGERY | Age: 75
End: 2025-05-14
Payer: COMMERCIAL

## 2025-05-14 VITALS — HEIGHT: 70 IN | WEIGHT: 240 LBS | BODY MASS INDEX: 34.36 KG/M2 | RESPIRATION RATE: 16 BRPM

## 2025-05-14 DIAGNOSIS — M17.11 PRIMARY OSTEOARTHRITIS OF RIGHT KNEE: Primary | ICD-10-CM

## 2025-05-14 DIAGNOSIS — M17.12 PRIMARY OSTEOARTHRITIS OF LEFT KNEE: ICD-10-CM

## 2025-05-14 PROCEDURE — 20610 DRAIN/INJ JOINT/BURSA W/O US: CPT | Performed by: PHYSICIAN ASSISTANT

## 2025-05-14 RX ORDER — TRIAMCINOLONE ACETONIDE 40 MG/ML
40 INJECTION, SUSPENSION INTRA-ARTICULAR; INTRAMUSCULAR ONCE
Status: COMPLETED | OUTPATIENT
Start: 2025-05-14 | End: 2025-05-14

## 2025-05-14 RX ORDER — BUPIVACAINE HYDROCHLORIDE 2.5 MG/ML
2 INJECTION, SOLUTION INFILTRATION; PERINEURAL ONCE
Status: COMPLETED | OUTPATIENT
Start: 2025-05-14 | End: 2025-05-14

## 2025-05-14 RX ADMIN — BUPIVACAINE HYDROCHLORIDE 5 MG: 2.5 INJECTION, SOLUTION INFILTRATION; PERINEURAL at 09:23

## 2025-05-14 RX ADMIN — BUPIVACAINE HYDROCHLORIDE 5 MG: 2.5 INJECTION, SOLUTION INFILTRATION; PERINEURAL at 09:24

## 2025-05-14 RX ADMIN — TRIAMCINOLONE ACETONIDE 40 MG: 40 INJECTION, SUSPENSION INTRA-ARTICULAR; INTRAMUSCULAR at 09:25

## 2025-05-14 RX ADMIN — TRIAMCINOLONE ACETONIDE 40 MG: 40 INJECTION, SUSPENSION INTRA-ARTICULAR; INTRAMUSCULAR at 09:24

## 2025-05-14 NOTE — PROGRESS NOTES
of osteoarthritis of the knee.  4)  Recommend maintaining weight in a healthy range to reduce stresses on the knee, particularly the patellofemoral compartment which sees up to 6x body weight.  He reports he has been losing weight by staying active, walking.  He would like to lose another 30 pounds he reports.  5)  Continue home exercise program, focusing on strengthening, low impact aerobic exercises, and neuromuscular education.  6)  Intra-articular corticosteroid injections are an option.  Informed patient corticosteroid injections can be performed every 4 months as needed.  7)  Evidence for intra-articular hyaluronic acid injections (viscosupplementation) is not as strong, but may benefit a subset of patients who have failed other options.  Informed patient viscosupplementation can be performed every 6 months as needed.   8)  Bracing and cane use can improve pain and function.    Although not specifically listed in the CPGs, ice therapy and topical patches such as Salonpas are good options as well.    Good relief with previous corticosteroid injections  Would like repeat today    Procedure:  Risks and benefits of a steroid injection were discussed with the patient, including the possibility of adverse local site reactions such as dermal atrophy and skin discoloration.  Although rare, an infection is possible and may necessitate surgical treatment if it occurs in a joint or develops into an abscess.  Finally, a rise in blood sugar levels is anticipated, particularly in diabetics.  Diabetic patients were instructed to monitor their blood glucose levels after the injection and to adjust their insulin regimen as appropriate.  The patient elected to proceed, and after verbal consent was obtained and drug allergies were reviewed, the injection site was prepped with alcohol and ChloraPrep.  40mg of Kenalog mixed with marcaine (no epinephrine) was injected into bilateral knee(s).  There were no immediate complications

## 2025-07-01 NOTE — PROGRESS NOTES
[facility]  Advanced CHF/Pulmonary Hypertension   Cardiac Evaluation      Lawrence Rodriguez  YOB: 1950    Date of Visit:  7/7/25    Chief Complaint   Patient presents with    6 Month Follow-Up    Congestive Heart Failure      History of Present Illness:  Lawrence Rodriguez is a 75 y.o. male with a past medical history of PHTN, non-obstructive CAD, HTN, HLD, LUZ MARINA, & chronic atrial fibrillation diagnosed in 2004 treated with sotalol & Coumadin.  He had an acute GIB in March 2018 and was started on PPI.   He now has increased RVSP from 41mmHg in August 2014 to 70mmHg in Sept 2019.  He was started on spironolactone.  He had his sleep apnea testing, and it was severely positive.  He failed CPAP and was changed to Bipap.  He had Covid Nov 2020 (symptoms primarily loss of taste & smell, no dyspnea).    LOV 8/12/24: stop taking metoprolol, start taking Toprol XL 25 mg daily    5/5/25 pt had EGD and colonoscopy.  Pancolonic diverticulosis.  Two sessile 3 mm polyps removed per Dr. Diggs. Hypertensive on the day of procedure.        Pt was last seen in office 1/24/25 and presents today for a follow up of chronic diastolic heart failure. Last EF 68% with Grade III diastolic dysfunction on 4/24/24.  Pt had [facility] labs on 6/3/25.  He has been off Ozempic for some procedures and only gained 3-4 pounds.  His A1c went to 6.0 from 5.7.  He is going back on Ozempic.  His knees and ankles feel better with the weight loss.  He is walking 1.6-1.7 miles a day everyday.  He started Vit D and B12 per his PCP.  Vitamin D level 20.9.  His echo was reviewed in office.  EF 65% with RVSP of 35mmHg.  He states he sleeps better with his new bed because it raises the head of the bed.  He is unable to tolerate CPAP.  He is here with his son, Ramesh.  The patient and his son are connected by the Apple watch with sharing if he were to fall.  He denies dizziness.        Heart Failure Quality of Life Questionnaire   What brings you

## 2025-07-07 ENCOUNTER — RESULTS FOLLOW-UP (OUTPATIENT)
Dept: CARDIOLOGY CLINIC | Age: 75
End: 2025-07-07

## 2025-07-07 ENCOUNTER — OFFICE VISIT (OUTPATIENT)
Dept: CARDIOLOGY CLINIC | Age: 75
End: 2025-07-07
Attending: INTERNAL MEDICINE
Payer: COMMERCIAL

## 2025-07-07 ENCOUNTER — HOSPITAL ENCOUNTER (OUTPATIENT)
Age: 75
Discharge: HOME | End: 2025-07-09
Attending: INTERNAL MEDICINE
Payer: COMMERCIAL

## 2025-07-07 VITALS
WEIGHT: 240 LBS | HEIGHT: 70 IN | BODY MASS INDEX: 34.36 KG/M2 | DIASTOLIC BLOOD PRESSURE: 79 MMHG | SYSTOLIC BLOOD PRESSURE: 145 MMHG

## 2025-07-07 VITALS
BODY MASS INDEX: 36.08 KG/M2 | DIASTOLIC BLOOD PRESSURE: 80 MMHG | OXYGEN SATURATION: 97 % | WEIGHT: 252 LBS | SYSTOLIC BLOOD PRESSURE: 138 MMHG | HEIGHT: 70 IN | HEART RATE: 60 BPM

## 2025-07-07 DIAGNOSIS — I25.10 ATHEROSCLEROSIS OF NATIVE CORONARY ARTERY OF NATIVE HEART WITHOUT ANGINA PECTORIS: ICD-10-CM

## 2025-07-07 DIAGNOSIS — I50.32 CHRONIC DIASTOLIC CONGESTIVE HEART FAILURE (HCC): ICD-10-CM

## 2025-07-07 DIAGNOSIS — R06.09 DOE (DYSPNEA ON EXERTION): ICD-10-CM

## 2025-07-07 DIAGNOSIS — I50.32 CHRONIC DIASTOLIC CONGESTIVE HEART FAILURE (HCC): Primary | ICD-10-CM

## 2025-07-07 DIAGNOSIS — R06.02 SOB (SHORTNESS OF BREATH): ICD-10-CM

## 2025-07-07 DIAGNOSIS — I10 ESSENTIAL HYPERTENSION: ICD-10-CM

## 2025-07-07 DIAGNOSIS — I48.20 CHRONIC ATRIAL FIBRILLATION (HCC): ICD-10-CM

## 2025-07-07 LAB
ECHO AO ASC DIAM: 3.6 CM
ECHO AO ASCENDING AORTA INDEX: 1.59 CM/M2
ECHO AO ROOT DIAM: 3.5 CM
ECHO AO ROOT INDEX: 1.55 CM/M2
ECHO AV AREA PEAK VELOCITY: 3 CM2
ECHO AV AREA/BSA PEAK VELOCITY: 1.3 CM2/M2
ECHO AV PEAK GRADIENT: 4 MMHG
ECHO AV PEAK VELOCITY: 1 M/S
ECHO AV VELOCITY RATIO: 0.9
ECHO BSA: 2.32 M2
ECHO EST RA PRESSURE: 3 MMHG
ECHO IVC INSP: 0.7 CM
ECHO IVC PROX: 1.9 CM
ECHO LA AREA 2C: 35.3 CM2
ECHO LA AREA 4C: 33.8 CM2
ECHO LA MAJOR AXIS: 7 CM
ECHO LA MINOR AXIS: 7.2 CM
ECHO LA VOL BP: 133 ML (ref 18–58)
ECHO LA VOL MOD A2C: 137 ML (ref 18–58)
ECHO LA VOL MOD A4C: 128 ML (ref 18–58)
ECHO LA VOL/BSA BIPLANE: 59 ML/M2 (ref 16–34)
ECHO LA VOLUME INDEX MOD A2C: 61 ML/M2 (ref 16–34)
ECHO LA VOLUME INDEX MOD A4C: 57 ML/M2 (ref 16–34)
ECHO LV E' LATERAL VELOCITY: 9.9 CM/S
ECHO LV E' SEPTAL VELOCITY: 8.25 CM/S
ECHO LV EDV A2C: 57 ML
ECHO LV EDV A4C: 91 ML
ECHO LV EDV INDEX A4C: 40 ML/M2
ECHO LV EDV NDEX A2C: 25 ML/M2
ECHO LV EF PHYSICIAN: 65 %
ECHO LV EJECTION FRACTION A2C: 60 %
ECHO LV EJECTION FRACTION A4C: 64 %
ECHO LV EJECTION FRACTION BIPLANE: 62 % (ref 55–100)
ECHO LV ESV A2C: 23 ML
ECHO LV ESV A4C: 33 ML
ECHO LV ESV INDEX A2C: 10 ML/M2
ECHO LV ESV INDEX A4C: 15 ML/M2
ECHO LV FRACTIONAL SHORTENING: 38 % (ref 28–44)
ECHO LV INTERNAL DIMENSION DIASTOLE INDEX: 1.99 CM/M2
ECHO LV INTERNAL DIMENSION DIASTOLIC: 4.5 CM (ref 4.2–5.9)
ECHO LV INTERNAL DIMENSION SYSTOLIC INDEX: 1.24 CM/M2
ECHO LV INTERNAL DIMENSION SYSTOLIC: 2.8 CM
ECHO LV IVSD: 1.1 CM (ref 0.6–1)
ECHO LV MASS 2D: 164 G (ref 88–224)
ECHO LV MASS INDEX 2D: 72.6 G/M2 (ref 49–115)
ECHO LV POSTERIOR WALL DIASTOLIC: 1 CM (ref 0.6–1)
ECHO LV RELATIVE WALL THICKNESS RATIO: 0.44
ECHO LVOT AREA: 3.5 CM2
ECHO LVOT DIAM: 2.1 CM
ECHO LVOT MEAN GRADIENT: 2 MMHG
ECHO LVOT PEAK GRADIENT: 3 MMHG
ECHO LVOT PEAK VELOCITY: 0.9 M/S
ECHO LVOT STROKE VOLUME INDEX: 28 ML/M2
ECHO LVOT SV: 63.4 ML
ECHO LVOT VTI: 18.3 CM
ECHO PV MAX VELOCITY: 1 M/S
ECHO PV PEAK GRADIENT: 4 MMHG
ECHO RA AREA 4C: 27.4 CM2
ECHO RA END SYSTOLIC VOLUME APICAL 4 CHAMBER INDEX BSA: 38 ML/M2
ECHO RA VOLUME: 87 ML
ECHO RIGHT VENTRICULAR SYSTOLIC PRESSURE (RVSP): 35 MMHG
ECHO RV BASAL DIMENSION: 4 CM
ECHO RV FREE WALL PEAK S': 10.5 CM/S
ECHO RV LONGITUDINAL DIMENSION: 7.7 CM
ECHO RV MID DIMENSION: 2.4 CM
ECHO RV TAPSE: 1.7 CM (ref 1.7–?)
ECHO TV REGURGITANT MAX VELOCITY: 2.81 M/S
ECHO TV REGURGITANT PEAK GRADIENT: 32 MMHG

## 2025-07-07 PROCEDURE — 3079F DIAST BP 80-89 MM HG: CPT | Performed by: INTERNAL MEDICINE

## 2025-07-07 PROCEDURE — 99215 OFFICE O/P EST HI 40 MIN: CPT | Performed by: INTERNAL MEDICINE

## 2025-07-07 PROCEDURE — 93306 TTE W/DOPPLER COMPLETE: CPT

## 2025-07-07 PROCEDURE — 1159F MED LIST DOCD IN RCRD: CPT | Performed by: INTERNAL MEDICINE

## 2025-07-07 PROCEDURE — 1123F ACP DISCUSS/DSCN MKR DOCD: CPT | Performed by: INTERNAL MEDICINE

## 2025-07-07 PROCEDURE — 93306 TTE W/DOPPLER COMPLETE: CPT | Performed by: INTERNAL MEDICINE

## 2025-07-07 PROCEDURE — G2211 COMPLEX E/M VISIT ADD ON: HCPCS | Performed by: INTERNAL MEDICINE

## 2025-07-07 PROCEDURE — 3075F SYST BP GE 130 - 139MM HG: CPT | Performed by: INTERNAL MEDICINE

## 2025-07-07 NOTE — PATIENT INSTRUCTIONS
PLAN:  Take 4,000 units of Vitamin D every day for 3 months.  Take two tablets of 50mcg.    For Vitamin D 50mcg =2000units.   Continue same medications.   See Dr. Stokes in 6 months.   Call my office for any worsening symptoms such as shortness of breath, chest pain, sudden weight gain or loss, or any increased [phone]

## (undated) DEVICE — SNARE COLD DIAMOND 15MM THIN

## (undated) DEVICE — SUTURE VCRL + SZ 3-0 L27IN ABSRB UD L26MM SH 1/2 CIR VCP416H

## (undated) DEVICE — GLOVE SURG SZ 65 L12IN FNGR THK79MIL GRN LTX FREE

## (undated) DEVICE — BANDAGE COMPR W4INXL15FT BGE E SGL LAYERED CLP CLSR

## (undated) DEVICE — SINGLE USE AIR/WATER, SUCTION AND BIOPSY VALVES SET: Brand: ORCAPOD™

## (undated) DEVICE — INTENDED FOR TISSUE SEPARATION, AND OTHER PROCEDURES THAT REQUIRE A SHARP SURGICAL BLADE TO PUNCTURE OR CUT.: Brand: BARD-PARKER ® STAINLESS STEEL BLADES

## (undated) DEVICE — SUTURE CHROMIC GUT SZ 4-0 L27IN ABSRB BRN FS-2 L19MM 3/8 635H

## (undated) DEVICE — SOLUTION IRRIG 250ML 0.9% SOD CHL USP POUR PLAS BTL

## (undated) DEVICE — WRAP COHESIVE W2INXL5YD TAN SELF ADH BNDG HND NON STERILE TEAR CARING

## (undated) DEVICE — BW-412T DISP COMBO CLEANING BRUSH: Brand: SINGLE USE COMBINATION CLEANING BRUSH

## (undated) DEVICE — SOLUTION IV IRRIG WATER 500ML POUR BRL ST 2F7113

## (undated) DEVICE — Z DISCONTINUED USE 2272117 DRAPE SURG 3 QTR N INVASIVE 2 LAYR DISP

## (undated) DEVICE — TRAP SPEC RETRV CLR PLAS POLYP IN LN SUCT QUIK CTCH

## (undated) DEVICE — GLOVE SURG SZ 65 CRM LTX FREE POLYISOPRENE POLYMER BEAD ANTI

## (undated) DEVICE — ENDOSCOPIC KIT 6X3/16 FT COLON W/ 1.1 OZ 2 GWN W/O BRSH

## (undated) DEVICE — GOWN SIRUS NONREIN XL W/TWL: Brand: MEDLINE INDUSTRIES, INC.

## (undated) DEVICE — VALVE SUCTION AIR H2O SET ORCA POD + DISP

## (undated) DEVICE — PADDING,UNDERCAST,COTTON, 4"X4YD STERILE: Brand: MEDLINE

## (undated) DEVICE — SOLUTION IRRIG 500ML STRL H2O NONPYROGENIC

## (undated) DEVICE — WILLIS PACK: Brand: MEDLINE INDUSTRIES, INC.

## (undated) DEVICE — TUBING IRRIG COMPATIBLE W ERBE MEDIVATOR PMP HYDR

## (undated) DEVICE — BANDAGE COMPR W2INXL5YD TAN BRTH SELF ADH WRP W/ HND TEAR

## (undated) DEVICE — GLOVE SURG SZ 75 CRM LTX FREE POLYISOPRENE POLYMER BEAD ANTI

## (undated) DEVICE — CAP WATER BTL AIR TBNG L 16 IN CO2 TBNG L 48 IN ENDOSCP

## (undated) DEVICE — SOLUTION IV IRRIG 250ML ST LF 0.9% SODIUM 2F7122

## (undated) DEVICE — PADDING UNDERCAST W4INXL4YD 100% COT CRIMPED FINISH WBRL II

## (undated) DEVICE — AIR/WATER CLEANING ADAPTER FOR OLYMPUS® GI ENDOSCOPE: Brand: BULLDOG®

## (undated) DEVICE — COVER LT HNDL BLU PLAS